# Patient Record
Sex: MALE | Race: WHITE | NOT HISPANIC OR LATINO | Employment: FULL TIME | URBAN - METROPOLITAN AREA
[De-identification: names, ages, dates, MRNs, and addresses within clinical notes are randomized per-mention and may not be internally consistent; named-entity substitution may affect disease eponyms.]

---

## 2023-08-09 ENCOUNTER — HOSPITAL ENCOUNTER (OUTPATIENT)
Dept: DATA CONVERSION | Facility: HOSPITAL | Age: 63
Discharge: HOME | End: 2023-08-09

## 2023-08-09 DIAGNOSIS — M19.011 PRIMARY OSTEOARTHRITIS, RIGHT SHOULDER: ICD-10-CM

## 2023-10-16 DIAGNOSIS — I10 HYPERTENSION, UNSPECIFIED TYPE: Primary | ICD-10-CM

## 2023-10-16 RX ORDER — LISINOPRIL 20 MG/1
20 TABLET ORAL DAILY
COMMUNITY
Start: 2023-07-12 | End: 2023-10-16 | Stop reason: SDUPTHER

## 2023-10-16 NOTE — TELEPHONE ENCOUNTER
Rx Refill Request Telephone Encounter    Name:  Doug Encinas  :  [id number]  Medication Name:  Lisinopril , he is out . CPE . Told him MJM is out            Specific Pharmacy location:  [facility]  Date of last appointment:    Date of next appointment:    Best number to reach patient:

## 2023-10-18 RX ORDER — LISINOPRIL 20 MG/1
20 TABLET ORAL DAILY
Qty: 90 TABLET | Refills: 0 | Status: SHIPPED | OUTPATIENT
Start: 2023-10-18 | End: 2024-01-02

## 2023-11-02 DIAGNOSIS — E11.9 TYPE 2 DIABETES MELLITUS WITHOUT COMPLICATION, UNSPECIFIED WHETHER LONG TERM INSULIN USE (MULTI): ICD-10-CM

## 2023-11-02 DIAGNOSIS — E11.9 TYPE 2 DIABETES MELLITUS WITHOUT COMPLICATION, WITHOUT LONG-TERM CURRENT USE OF INSULIN (MULTI): Primary | ICD-10-CM

## 2023-11-02 RX ORDER — TIRZEPATIDE 5 MG/.5ML
5 INJECTION, SOLUTION SUBCUTANEOUS
Qty: 6 ML | Refills: 3 | Status: SHIPPED | OUTPATIENT
Start: 2023-11-02 | End: 2024-05-03 | Stop reason: SDUPTHER

## 2023-11-09 RX ORDER — TIRZEPATIDE 2.5 MG/.5ML
INJECTION, SOLUTION SUBCUTANEOUS
Qty: 2 ML | Refills: 3 | Status: SHIPPED | OUTPATIENT
Start: 2023-11-09 | End: 2023-12-21 | Stop reason: ALTCHOICE

## 2023-12-01 ENCOUNTER — LAB (OUTPATIENT)
Dept: LAB | Facility: LAB | Age: 63
End: 2023-12-01
Payer: COMMERCIAL

## 2023-12-01 DIAGNOSIS — I10 ESSENTIAL (PRIMARY) HYPERTENSION: Primary | ICD-10-CM

## 2023-12-01 DIAGNOSIS — E66.9 OBESITY, UNSPECIFIED: ICD-10-CM

## 2023-12-01 DIAGNOSIS — Z00.00 ENCOUNTER FOR GENERAL ADULT MEDICAL EXAMINATION WITHOUT ABNORMAL FINDINGS: ICD-10-CM

## 2023-12-01 DIAGNOSIS — E78.00 PURE HYPERCHOLESTEROLEMIA, UNSPECIFIED: ICD-10-CM

## 2023-12-01 LAB
ALBUMIN SERPL-MCNC: 4.6 G/DL (ref 3.5–5)
ALP BLD-CCNC: 67 U/L (ref 35–125)
ALT SERPL-CCNC: 23 U/L (ref 5–40)
ANION GAP SERPL CALC-SCNC: 17 MMOL/L
APPEARANCE UR: CLEAR
AST SERPL-CCNC: 20 U/L (ref 5–40)
BASOPHILS # BLD AUTO: 0.09 X10*3/UL (ref 0–0.1)
BASOPHILS NFR BLD AUTO: 1.2 %
BILIRUB SERPL-MCNC: 0.7 MG/DL (ref 0.1–1.2)
BILIRUB UR STRIP.AUTO-MCNC: NEGATIVE MG/DL
BUN SERPL-MCNC: 21 MG/DL (ref 8–25)
CALCIUM SERPL-MCNC: 9.7 MG/DL (ref 8.5–10.4)
CHLORIDE SERPL-SCNC: 102 MMOL/L (ref 97–107)
CHOLEST SERPL-MCNC: 116 MG/DL (ref 133–200)
CHOLEST/HDLC SERPL: 2.6 {RATIO}
CO2 SERPL-SCNC: 24 MMOL/L (ref 24–31)
COLOR UR: YELLOW
CREAT SERPL-MCNC: 0.8 MG/DL (ref 0.4–1.6)
EOSINOPHIL # BLD AUTO: 0.25 X10*3/UL (ref 0–0.7)
EOSINOPHIL NFR BLD AUTO: 3.3 %
ERYTHROCYTE [DISTWIDTH] IN BLOOD BY AUTOMATED COUNT: 13.2 % (ref 11.5–14.5)
GFR SERPL CREATININE-BSD FRML MDRD: >90 ML/MIN/1.73M*2
GLUCOSE SERPL-MCNC: 99 MG/DL (ref 65–99)
GLUCOSE UR STRIP.AUTO-MCNC: NORMAL MG/DL
HCT VFR BLD AUTO: 47.9 % (ref 41–52)
HDLC SERPL-MCNC: 45 MG/DL
HGB BLD-MCNC: 16 G/DL (ref 13.5–17.5)
IMM GRANULOCYTES # BLD AUTO: 0.04 X10*3/UL (ref 0–0.7)
IMM GRANULOCYTES NFR BLD AUTO: 0.5 % (ref 0–0.9)
KETONES UR STRIP.AUTO-MCNC: NEGATIVE MG/DL
LDLC SERPL CALC-MCNC: 49 MG/DL (ref 65–130)
LEUKOCYTE ESTERASE UR QL STRIP.AUTO: NEGATIVE
LYMPHOCYTES # BLD AUTO: 2.65 X10*3/UL (ref 1.2–4.8)
LYMPHOCYTES NFR BLD AUTO: 35.3 %
MCH RBC QN AUTO: 29.1 PG (ref 26–34)
MCHC RBC AUTO-ENTMCNC: 33.4 G/DL (ref 32–36)
MCV RBC AUTO: 87 FL (ref 80–100)
MONOCYTES # BLD AUTO: 0.54 X10*3/UL (ref 0.1–1)
MONOCYTES NFR BLD AUTO: 7.2 %
NEUTROPHILS # BLD AUTO: 3.94 X10*3/UL (ref 1.2–7.7)
NEUTROPHILS NFR BLD AUTO: 52.5 %
NITRITE UR QL STRIP.AUTO: NEGATIVE
NRBC BLD-RTO: 0 /100 WBCS (ref 0–0)
PH UR STRIP.AUTO: 5.5 [PH]
PLATELET # BLD AUTO: 287 X10*3/UL (ref 150–450)
POTASSIUM SERPL-SCNC: 4.8 MMOL/L (ref 3.4–5.1)
PROT SERPL-MCNC: 6.5 G/DL (ref 5.9–7.9)
PROT UR STRIP.AUTO-MCNC: NEGATIVE MG/DL
PSA SERPL-MCNC: 2.7 NG/ML
RBC # BLD AUTO: 5.49 X10*6/UL (ref 4.5–5.9)
RBC # UR STRIP.AUTO: NEGATIVE /UL
SODIUM SERPL-SCNC: 143 MMOL/L (ref 133–145)
SP GR UR STRIP.AUTO: 1.03
TRIGL SERPL-MCNC: 109 MG/DL (ref 40–150)
TSH SERPL DL<=0.05 MIU/L-ACNC: 3.22 MIU/L (ref 0.27–4.2)
UROBILINOGEN UR STRIP.AUTO-MCNC: NORMAL MG/DL
WBC # BLD AUTO: 7.5 X10*3/UL (ref 4.4–11.3)

## 2023-12-01 PROCEDURE — 84153 ASSAY OF PSA TOTAL: CPT

## 2023-12-01 PROCEDURE — 36415 COLL VENOUS BLD VENIPUNCTURE: CPT

## 2023-12-01 PROCEDURE — 81003 URINALYSIS AUTO W/O SCOPE: CPT

## 2023-12-01 PROCEDURE — 80053 COMPREHEN METABOLIC PANEL: CPT

## 2023-12-01 PROCEDURE — 85025 COMPLETE CBC W/AUTO DIFF WBC: CPT

## 2023-12-01 PROCEDURE — 84443 ASSAY THYROID STIM HORMONE: CPT

## 2023-12-01 PROCEDURE — 80061 LIPID PANEL: CPT

## 2023-12-02 DIAGNOSIS — K21.9 GASTROESOPHAGEAL REFLUX DISEASE WITHOUT ESOPHAGITIS: ICD-10-CM

## 2023-12-02 DIAGNOSIS — M19.019 SHOULDER ARTHRITIS: Primary | ICD-10-CM

## 2023-12-02 DIAGNOSIS — N40.0 BENIGN PROSTATIC HYPERPLASIA WITHOUT LOWER URINARY TRACT SYMPTOMS: ICD-10-CM

## 2023-12-02 DIAGNOSIS — E78.5 HYPERLIPIDEMIA, UNSPECIFIED HYPERLIPIDEMIA TYPE: ICD-10-CM

## 2023-12-05 RX ORDER — TAMSULOSIN HYDROCHLORIDE 0.4 MG/1
0.4 CAPSULE ORAL DAILY
Qty: 90 CAPSULE | Refills: 2 | Status: SHIPPED | OUTPATIENT
Start: 2023-12-05

## 2023-12-05 RX ORDER — MELOXICAM 15 MG/1
15 TABLET ORAL DAILY
Qty: 90 TABLET | Refills: 2 | Status: SHIPPED | OUTPATIENT
Start: 2023-12-05 | End: 2024-04-20 | Stop reason: HOSPADM

## 2023-12-05 RX ORDER — OMEPRAZOLE 20 MG/1
20 CAPSULE, DELAYED RELEASE ORAL DAILY
Qty: 90 CAPSULE | Refills: 2 | Status: SHIPPED | OUTPATIENT
Start: 2023-12-05

## 2023-12-05 RX ORDER — ATORVASTATIN CALCIUM 80 MG/1
80 TABLET, FILM COATED ORAL DAILY
Qty: 90 TABLET | Refills: 2 | Status: SHIPPED | OUTPATIENT
Start: 2023-12-05

## 2023-12-21 ENCOUNTER — OFFICE VISIT (OUTPATIENT)
Dept: PRIMARY CARE | Facility: CLINIC | Age: 63
End: 2023-12-21
Payer: COMMERCIAL

## 2023-12-21 VITALS
BODY MASS INDEX: 29.99 KG/M2 | HEART RATE: 74 BPM | WEIGHT: 180 LBS | DIASTOLIC BLOOD PRESSURE: 76 MMHG | SYSTOLIC BLOOD PRESSURE: 118 MMHG | OXYGEN SATURATION: 99 % | HEIGHT: 65 IN

## 2023-12-21 DIAGNOSIS — E66.9 OBESITY WITHOUT SERIOUS COMORBIDITY, UNSPECIFIED CLASSIFICATION, UNSPECIFIED OBESITY TYPE: ICD-10-CM

## 2023-12-21 DIAGNOSIS — E78.00 HYPERCHOLESTEREMIA: ICD-10-CM

## 2023-12-21 DIAGNOSIS — E11.9 TYPE 2 DIABETES MELLITUS WITHOUT COMPLICATION, WITHOUT LONG-TERM CURRENT USE OF INSULIN (MULTI): ICD-10-CM

## 2023-12-21 DIAGNOSIS — I10 ESSENTIAL HYPERTENSION, BENIGN: Primary | ICD-10-CM

## 2023-12-21 DIAGNOSIS — Z00.00 WELL ADULT EXAM: ICD-10-CM

## 2023-12-21 PROCEDURE — 3074F SYST BP LT 130 MM HG: CPT | Performed by: FAMILY MEDICINE

## 2023-12-21 PROCEDURE — 3078F DIAST BP <80 MM HG: CPT | Performed by: FAMILY MEDICINE

## 2023-12-21 PROCEDURE — 1036F TOBACCO NON-USER: CPT | Performed by: FAMILY MEDICINE

## 2023-12-21 PROCEDURE — 3048F LDL-C <100 MG/DL: CPT | Performed by: FAMILY MEDICINE

## 2023-12-21 PROCEDURE — 4010F ACE/ARB THERAPY RXD/TAKEN: CPT | Performed by: FAMILY MEDICINE

## 2023-12-21 PROCEDURE — 99396 PREV VISIT EST AGE 40-64: CPT | Performed by: FAMILY MEDICINE

## 2023-12-21 PROCEDURE — 3044F HG A1C LEVEL LT 7.0%: CPT | Performed by: FAMILY MEDICINE

## 2023-12-21 RX ORDER — METFORMIN HYDROCHLORIDE 750 MG/1
750 TABLET, EXTENDED RELEASE ORAL
COMMUNITY
End: 2023-12-26

## 2023-12-21 RX ORDER — GABAPENTIN 400 MG/1
400 CAPSULE ORAL 2 TIMES DAILY
COMMUNITY
Start: 2023-07-14 | End: 2024-06-04 | Stop reason: ALTCHOICE

## 2023-12-21 NOTE — PROGRESS NOTES
"Subjective   Patient ID: Doug Encinas is a 63 y.o. male who presents for Annual Exam.    HPI Here for physical exam.  Patient has chronic arthritis in his hands back and neck, and he uses meloxicam daily.  Patient has reflux and is stable on omeprazole.  Patient has benign prostatic hypertrophy. He is stable on tamsulosin.  Patient has hyperlipidemia and is stable on atorvastatin 40 milligrams daily.  Patient has diabetes. He has on metformin  milligrams twice a day as well as glimepiride 1 milligram daily.  Well on Mounjaro5 mg weekly.    Review of Systems  Constitutional Symptoms: Patient is positive for concerted weight loss. t is negative for fever, loss of appetite, headaches, fatigue.   Eyes: Pt is negative for loss and blurring of vision, double vision.   Ear, Nose, Mouth, Throat: Pt is negative for hearing loss, tinnitus, nasal congestion, rhinorrhea, nose bleeds, teeth problems, mouth sores, gum disease, dysphagia, sore throat.   Cardiovascular: Pt is negative for chest pain/pressure, palpitations, edema, claudication.   Respiratory: Pt is negative for shortness of breath, dyspnea on exertion, pain with breathing, coughing.   Gastrointestinal: Pt is negative for anorexia, indigestion, nausea, vomiting, abdominal pain, change in bowel habits, diarrhea, constipation, hematochezia, melena, blood in stool.   Musculoskeletal: patient is positive for pain with walking in the right lower leg. Pt is negative for joint pain, joint swelling, myalgias, cramps.   Integumentary: Pt is negative for change in mole, skin trouble or rash.   Neurological: Pt is negative for headache, numbness, tingling, weakness, tremors.   Psychiatric: Pt is negative for depression, anxiety.   Endocrine: Pt is negative for weight gain, heat or cold intolerance, polyuria, polydipsia, polyphagia.   Objective   /76   Pulse 74   Ht 1.651 m (5' 5\")   Wt 81.6 kg (180 lb)   SpO2 99%   BMI 29.95 kg/m²     Physical Exam  General " Appearance: Vital Signs have been reviewed. Comfortable. Well nourished, and well developed. He is awake, alert, and oriented and appears his stated age. The patient is cooperative with exam.  Head: Hair pattern reveals a normal pattern for patients age and The face shows no abnormalities .  Eyes: PERRLA, EOMI, conjunctiva and sclerae clear. Extraocular muscle exam reveals EOMI.  Ears, Nose, Mouth, Throat: EARS: External bilateral ears reveals normal helix, tragus and ear lobe. Bilateral canals are normal. Both tympanic membranes are pearly gray and landmarks normal .   NOSE: Nasal mucosa in both nostrils reveals no polyps, ulcerations, or lesions. Teeth reveal good repair. Posterior pharynx reveals no abnormalities.  Neck: Neck reveals supple, no adenopathy, no thyromegaly, or carotid bruits.  Chest: Lungs are clear to auscultation bilaterally with no wheezes, rales, or rhonchi.  Cardiovascular: RRR without MRG. Bilateral DP pulses are 2+. Extremities reveal no cyanosis, clubbing, or edema.  Abdomen: Abdomen is soft, NT, ND with no masses.  Genitourinary: Deferred  Musculoskeletal: 5/5 and equal strength in bilateral upper and lower extremities. Diminished pedal pulses bilaterally  Skin: Patient has an epidermal cyst on the right upper back. Skin reveals good turgor and no rashes .  Neurological: Neuro: Intact and non-focal.  Psychiatric: Patient has appropriate judgement. Patient has good insight. Patient's mood is appropriate.  Assessment/Plan   Problem List Items Addressed This Visit             ICD-10-CM    Type 2 diabetes mellitus without complication (CMS/HCC)  Improved.  There was no hemoglobin A1c done in recent labs and will not order 1 at this time.  Patient will follow-up in 6 months for recheck E11.9    Relevant Orders    POCT glycosylated hemoglobin (Hb A1C) manually resulted    Comprehensive metabolic panel     Other Visit Diagnoses         Codes    Essential hypertension, benign    -  Primary   Stable. I10    Obesity without serious comorbidity, unspecified classification, unspecified obesity type    Much improved.  Patient is lost more than 20 pounds in the last 6 months. E66.9    Hypercholesteremia    Stable.  Continue on current medication. E78.00    Well adult exam    Exam. Z00.00

## 2023-12-24 DIAGNOSIS — Z79.4 TYPE 2 DIABETES MELLITUS WITHOUT COMPLICATION, WITH LONG-TERM CURRENT USE OF INSULIN (MULTI): ICD-10-CM

## 2023-12-24 DIAGNOSIS — E11.9 TYPE 2 DIABETES MELLITUS WITHOUT COMPLICATION, WITH LONG-TERM CURRENT USE OF INSULIN (MULTI): ICD-10-CM

## 2023-12-26 RX ORDER — METFORMIN HYDROCHLORIDE 750 MG/1
750 TABLET, EXTENDED RELEASE ORAL 2 TIMES DAILY
Qty: 180 TABLET | Refills: 2 | Status: SHIPPED | OUTPATIENT
Start: 2023-12-26

## 2023-12-30 DIAGNOSIS — I10 HYPERTENSION, UNSPECIFIED TYPE: ICD-10-CM

## 2024-01-02 RX ORDER — LISINOPRIL 20 MG/1
20 TABLET ORAL DAILY
Qty: 90 TABLET | Refills: 3 | Status: SHIPPED | OUTPATIENT
Start: 2024-01-02

## 2024-02-29 ENCOUNTER — OFFICE VISIT (OUTPATIENT)
Dept: PRIMARY CARE | Facility: CLINIC | Age: 64
End: 2024-02-29
Payer: COMMERCIAL

## 2024-02-29 VITALS
WEIGHT: 180.8 LBS | HEART RATE: 79 BPM | BODY MASS INDEX: 30.09 KG/M2 | OXYGEN SATURATION: 98 % | SYSTOLIC BLOOD PRESSURE: 118 MMHG | TEMPERATURE: 97.4 F | DIASTOLIC BLOOD PRESSURE: 70 MMHG

## 2024-02-29 DIAGNOSIS — H10.31 ACUTE BACTERIAL CONJUNCTIVITIS OF RIGHT EYE: Primary | ICD-10-CM

## 2024-02-29 PROCEDURE — 3074F SYST BP LT 130 MM HG: CPT

## 2024-02-29 PROCEDURE — 1036F TOBACCO NON-USER: CPT

## 2024-02-29 PROCEDURE — 99213 OFFICE O/P EST LOW 20 MIN: CPT

## 2024-02-29 PROCEDURE — 4010F ACE/ARB THERAPY RXD/TAKEN: CPT

## 2024-02-29 PROCEDURE — 3078F DIAST BP <80 MM HG: CPT

## 2024-02-29 RX ORDER — POLYMYXIN B SULFATE AND TRIMETHOPRIM 1; 10000 MG/ML; [USP'U]/ML
1 SOLUTION OPHTHALMIC EVERY 4 HOURS
Qty: 10 ML | Refills: 0 | Status: SHIPPED | OUTPATIENT
Start: 2024-02-29 | End: 2024-03-07

## 2024-02-29 ASSESSMENT — ENCOUNTER SYMPTOMS
SINUS PRESSURE: 1
EYE REDNESS: 1
EYE DISCHARGE: 1
CHILLS: 0
FEVER: 0
EYE ITCHING: 1

## 2024-02-29 ASSESSMENT — PATIENT HEALTH QUESTIONNAIRE - PHQ9
1. LITTLE INTEREST OR PLEASURE IN DOING THINGS: NOT AT ALL
2. FEELING DOWN, DEPRESSED OR HOPELESS: NOT AT ALL
SUM OF ALL RESPONSES TO PHQ9 QUESTIONS 1 AND 2: 0

## 2024-02-29 ASSESSMENT — PAIN SCALES - GENERAL: PAINLEVEL: 2

## 2024-02-29 NOTE — PROGRESS NOTES
Subjective   Patient ID: Doug Encinas is a 64 y.o. male who presents for itchy, red eyes (Started yesterday.  Teary.  Right eye ).    HPI   Doug is seen today for c/o red right eye with drainage for 2 days. Woke up this morning with crusting around the eye. Wears glasses. No known injury. Also reports sinus congestion and pressure for the past few weeks but these symptoms seem to be improving. Has been using intranasal steroid with mild relief. Grand kids are also sick. Denies chest pain, shortness of breath, fever, chills, nausea, vomiting, abdominal pain, photophobia, double vision, foreign body sensation.     Review of Systems   Constitutional:  Negative for chills and fever.   HENT:  Positive for congestion and sinus pressure.    Eyes:  Positive for discharge, redness and itching.   All other systems reviewed and are negative.    Objective   /70 (BP Location: Left arm, Patient Position: Sitting)   Pulse 79   Temp 36.3 °C (97.4 °F) (Temporal)   Wt 82 kg (180 lb 12.8 oz)   SpO2 98%   BMI 30.09 kg/m²     Physical Exam  Vitals and nursing note reviewed.   Constitutional:       General: He is not in acute distress.  HENT:      Right Ear: Tympanic membrane and ear canal normal.      Left Ear: Tympanic membrane and ear canal normal.      Nose: Nose normal.      Mouth/Throat:      Pharynx: No oropharyngeal exudate or posterior oropharyngeal erythema.   Eyes:      General: Lids are everted, no foreign bodies appreciated.      Extraocular Movements: Extraocular movements intact.      Conjunctiva/sclera:      Right eye: Right conjunctiva is injected.      Left eye: Left conjunctiva is not injected.      Pupils: Pupils are equal, round, and reactive to light.   Cardiovascular:      Rate and Rhythm: Normal rate and regular rhythm.   Pulmonary:      Effort: Pulmonary effort is normal.      Breath sounds: Normal breath sounds.   Musculoskeletal:         General: Normal range of motion.      Cervical back: Neck  supple.   Lymphadenopathy:      Cervical: No cervical adenopathy.   Skin:     General: Skin is warm.   Neurological:      General: No focal deficit present.      Mental Status: He is alert.   Psychiatric:         Mood and Affect: Mood normal.     Assessment/Plan   Problem List Items Addressed This Visit    None  Visit Diagnoses         Codes    Acute bacterial conjunctivitis of right eye    -  Primary  Discussed oral antibiotic treatment for URI and conjunctivitis. Patient declines as he feels like URI symptoms are improving.   Polytrim drops to affected eye as directed. Risks and benefits of medication discussed and prescribed.   May use OTC Refresh eye drops for eye discomfort, redness. Warm compresses to remove drainage.   Follow up if symptoms do not improve within 24-48 hours, or sooner for development of eye pain, changes in vision.  H10.31    Relevant Medications    polymyxin B sulf-trimethoprim (Polytrim) ophthalmic solution

## 2024-03-27 ENCOUNTER — TELEPHONE (OUTPATIENT)
Dept: PRIMARY CARE | Facility: CLINIC | Age: 64
End: 2024-03-27
Payer: COMMERCIAL

## 2024-03-27 DIAGNOSIS — E11.9 TYPE 2 DIABETES MELLITUS WITHOUT COMPLICATION, UNSPECIFIED WHETHER LONG TERM INSULIN USE (MULTI): Primary | ICD-10-CM

## 2024-03-28 ENCOUNTER — HOSPITAL ENCOUNTER (OUTPATIENT)
Dept: RADIOLOGY | Facility: CLINIC | Age: 64
Discharge: HOME | End: 2024-03-28
Payer: COMMERCIAL

## 2024-03-28 DIAGNOSIS — M54.12 RADICULOPATHY, CERVICAL REGION: ICD-10-CM

## 2024-03-28 DIAGNOSIS — R29.898 OTHER SYMPTOMS AND SIGNS INVOLVING THE MUSCULOSKELETAL SYSTEM: ICD-10-CM

## 2024-03-28 DIAGNOSIS — M50.20 OTHER CERVICAL DISC DISPLACEMENT, UNSPECIFIED CERVICAL REGION: ICD-10-CM

## 2024-03-28 PROCEDURE — 72125 CT NECK SPINE W/O DYE: CPT | Performed by: RADIOLOGY

## 2024-03-28 PROCEDURE — 72125 CT NECK SPINE W/O DYE: CPT

## 2024-04-15 ENCOUNTER — PRE-ADMISSION TESTING (OUTPATIENT)
Dept: PREADMISSION TESTING | Facility: HOSPITAL | Age: 64
End: 2024-04-15
Payer: COMMERCIAL

## 2024-04-15 VITALS
OXYGEN SATURATION: 98 % | TEMPERATURE: 96.6 F | DIASTOLIC BLOOD PRESSURE: 67 MMHG | HEART RATE: 78 BPM | HEIGHT: 65 IN | WEIGHT: 180 LBS | SYSTOLIC BLOOD PRESSURE: 109 MMHG | BODY MASS INDEX: 29.99 KG/M2 | RESPIRATION RATE: 16 BRPM

## 2024-04-15 DIAGNOSIS — Z01.818 PRE-OP TESTING: Primary | ICD-10-CM

## 2024-04-15 LAB
ABO GROUP (TYPE) IN BLOOD: NORMAL
ANION GAP SERPL CALC-SCNC: 9 MMOL/L
ANTIBODY SCREEN: NORMAL
APPEARANCE UR: CLEAR
ATRIAL RATE: 71 BPM
BASOPHILS # BLD AUTO: 0.04 X10*3/UL (ref 0–0.1)
BASOPHILS NFR BLD AUTO: 0.5 %
BILIRUB UR STRIP.AUTO-MCNC: NEGATIVE MG/DL
BUN SERPL-MCNC: 28 MG/DL (ref 8–25)
CALCIUM SERPL-MCNC: 9.5 MG/DL (ref 8.5–10.4)
CHLORIDE SERPL-SCNC: 100 MMOL/L (ref 97–107)
CO2 SERPL-SCNC: 26 MMOL/L (ref 24–31)
COLOR UR: YELLOW
CREAT SERPL-MCNC: 0.8 MG/DL (ref 0.4–1.6)
EGFRCR SERPLBLD CKD-EPI 2021: >90 ML/MIN/1.73M*2
EOSINOPHIL # BLD AUTO: 0.18 X10*3/UL (ref 0–0.7)
EOSINOPHIL NFR BLD AUTO: 2.1 %
ERYTHROCYTE [DISTWIDTH] IN BLOOD BY AUTOMATED COUNT: 13.4 % (ref 11.5–14.5)
EST. AVERAGE GLUCOSE BLD GHB EST-MCNC: 123 MG/DL
GLUCOSE SERPL-MCNC: 126 MG/DL (ref 65–99)
GLUCOSE UR STRIP.AUTO-MCNC: NORMAL MG/DL
HBA1C MFR BLD: 5.9 %
HCT VFR BLD AUTO: 46.7 % (ref 41–52)
HGB BLD-MCNC: 16 G/DL (ref 13.5–17.5)
IMM GRANULOCYTES # BLD AUTO: 0.04 X10*3/UL (ref 0–0.7)
IMM GRANULOCYTES NFR BLD AUTO: 0.5 % (ref 0–0.9)
KETONES UR STRIP.AUTO-MCNC: NEGATIVE MG/DL
LEUKOCYTE ESTERASE UR QL STRIP.AUTO: NEGATIVE
LYMPHOCYTES # BLD AUTO: 2.76 X10*3/UL (ref 1.2–4.8)
LYMPHOCYTES NFR BLD AUTO: 32.9 %
MCH RBC QN AUTO: 29.2 PG (ref 26–34)
MCHC RBC AUTO-ENTMCNC: 34.3 G/DL (ref 32–36)
MCV RBC AUTO: 85 FL (ref 80–100)
MONOCYTES # BLD AUTO: 0.65 X10*3/UL (ref 0.1–1)
MONOCYTES NFR BLD AUTO: 7.7 %
MUCOUS THREADS #/AREA URNS AUTO: NORMAL /LPF
NEUTROPHILS # BLD AUTO: 4.72 X10*3/UL (ref 1.2–7.7)
NEUTROPHILS NFR BLD AUTO: 56.3 %
NITRITE UR QL STRIP.AUTO: NEGATIVE
NRBC BLD-RTO: 0 /100 WBCS (ref 0–0)
P AXIS: 38 DEGREES
P OFFSET: 200 MS
P ONSET: 147 MS
PH UR STRIP.AUTO: 5.5 [PH]
PLATELET # BLD AUTO: 260 X10*3/UL (ref 150–450)
POTASSIUM SERPL-SCNC: 4.3 MMOL/L (ref 3.4–5.1)
PR INTERVAL: 158 MS
PROT UR STRIP.AUTO-MCNC: NORMAL MG/DL
Q ONSET: 226 MS
QRS COUNT: 12 BEATS
QRS DURATION: 86 MS
QT INTERVAL: 382 MS
QTC CALCULATION(BAZETT): 415 MS
QTC FREDERICIA: 404 MS
R AXIS: 16 DEGREES
RBC # BLD AUTO: 5.48 X10*6/UL (ref 4.5–5.9)
RBC # UR STRIP.AUTO: NEGATIVE /UL
RBC #/AREA URNS AUTO: NORMAL /HPF
RH FACTOR (ANTIGEN D): NORMAL
SODIUM SERPL-SCNC: 135 MMOL/L (ref 133–145)
SP GR UR STRIP.AUTO: 1.03
T AXIS: 35 DEGREES
T OFFSET: 417 MS
UROBILINOGEN UR STRIP.AUTO-MCNC: NORMAL MG/DL
VENTRICULAR RATE: 71 BPM
WBC # BLD AUTO: 8.4 X10*3/UL (ref 4.4–11.3)
WBC #/AREA URNS AUTO: NORMAL /HPF

## 2024-04-15 PROCEDURE — 93010 ELECTROCARDIOGRAM REPORT: CPT | Performed by: INTERNAL MEDICINE

## 2024-04-15 PROCEDURE — 80048 BASIC METABOLIC PNL TOTAL CA: CPT

## 2024-04-15 PROCEDURE — 81001 URINALYSIS AUTO W/SCOPE: CPT

## 2024-04-15 PROCEDURE — 85025 COMPLETE CBC W/AUTO DIFF WBC: CPT

## 2024-04-15 PROCEDURE — 83036 HEMOGLOBIN GLYCOSYLATED A1C: CPT

## 2024-04-15 PROCEDURE — 86901 BLOOD TYPING SEROLOGIC RH(D): CPT

## 2024-04-15 PROCEDURE — 87081 CULTURE SCREEN ONLY: CPT | Mod: TRILAB,WESLAB

## 2024-04-15 PROCEDURE — 93005 ELECTROCARDIOGRAM TRACING: CPT

## 2024-04-15 PROCEDURE — 99204 OFFICE O/P NEW MOD 45 MIN: CPT | Performed by: PHYSICIAN ASSISTANT

## 2024-04-15 PROCEDURE — 36415 COLL VENOUS BLD VENIPUNCTURE: CPT

## 2024-04-15 RX ORDER — CHLORHEXIDINE GLUCONATE ORAL RINSE 1.2 MG/ML
SOLUTION DENTAL
Qty: 473 ML | Refills: 0 | Status: SHIPPED | OUTPATIENT
Start: 2024-04-18 | End: 2024-04-20 | Stop reason: HOSPADM

## 2024-04-15 ASSESSMENT — CHADS2 SCORE
CHF: NO
AGE GREATER THAN OR EQUAL TO 75: NO
HYPERTENSION: YES
DIABETES: YES
CHADS2 SCORE: 2
PRIOR STROKE OR TIA OR THROMBOEMBOLISM: NO

## 2024-04-15 ASSESSMENT — DUKE ACTIVITY SCORE INDEX (DASI)
CAN YOU WALK A BLOCK OR TWO ON LEVEL GROUND: YES
CAN YOU DO MODERATE WORK AROUND THE HOUSE LIKE VACUUMING, SWEEPING FLOORS OR CARRYING GROCERIES: YES
CAN YOU DO HEAVY WORK AROUND THE HOUSE LIKE SCRUBBING FLOORS OR LIFTING AND MOVING HEAVY FURNITURE: YES
CAN YOU DO LIGHT WORK AROUND THE HOUSE LIKE DUSTING OR WASHING DISHES: YES
CAN YOU DO YARD WORK LIKE RAKING LEAVES, WEEDING OR PUSHING A MOWER: YES
CAN YOU WALK INDOORS, SUCH AS AROUND YOUR HOUSE: YES
DASI METS SCORE: 9.9
CAN YOU PARTICIPATE IN STRENOUS SPORTS LIKE SWIMMING, SINGLES TENNIS, FOOTBALL, BASKETBALL, OR SKIING: YES
CAN YOU PARTICIPATE IN MODERATE RECREATIONAL ACTIVITIES LIKE GOLF, BOWLING, DANCING, DOUBLES TENNIS OR THROWING A BASEBALL OR FOOTBALL: YES
TOTAL_SCORE: 58.2
CAN YOU RUN A SHORT DISTANCE: YES
CAN YOU CLIMB A FLIGHT OF STAIRS OR WALK UP A HILL: YES
CAN YOU HAVE SEXUAL RELATIONS: YES
CAN YOU TAKE CARE OF YOURSELF (EAT, DRESS, BATHE, OR USE TOILET): YES

## 2024-04-15 ASSESSMENT — ENCOUNTER SYMPTOMS: ARTHRALGIAS: 1

## 2024-04-15 NOTE — H&P (VIEW-ONLY)
"CPM/PAT Evaluation       Name: Doug Encinas (Doug Encinas)  /Age: 1960/64 y.o.     In-Person       Chief Complaint: \"neck pain\"    HPI  The patient is a 64 year old male.  For the past several years he has experienced posterior cervical neck pain with movement.  Over the past six months the pain has been severe and is radiating to the right shoulder.  He has associated right shoulder/upper arm tingling and weakness.  A cortisone injection several weeks ago was not helpful.  He was referred to Dr. Garrison and surgical intervention is recommended.    Past Medical History:   Diagnosis Date    BPH (benign prostatic hyperplasia)     Diabetes mellitus (Multi)     GERD (gastroesophageal reflux disease)     Hyperlipidemia     Hypertension     Nephrolithiasis        Past Surgical History:   Procedure Laterality Date    COLONOSCOPY      KNEE ARTHROPLASTY Left     (partial)    TOE SURGERY Bilateral     Great toe arthroplasty     Social History     Tobacco Use    Smoking status: Former     Current packs/day: 0.00     Average packs/day: 1 pack/day for 38.0 years (38.0 ttl pk-yrs)     Types: Cigarettes     Start date:      Quit date:      Years since quittin.2    Smokeless tobacco: Never   Substance Use Topics    Alcohol use: Not Currently     Social History     Substance and Sexual Activity   Drug Use Never       No Known Allergies    Current Outpatient Medications   Medication Sig Dispense Refill    tirzepatide (Mounjaro) 5 mg/0.5 mL pen injector Inject 5 mg under the skin 1 (one) time per week. (Patient taking differently: Inject 5 mg under the skin 1 (one) time per week. Last taken 2024) 6 mL 3    atorvastatin (Lipitor) 80 mg tablet TAKE 1 TABLET DAILY 90 tablet 2    [START ON 2024] chlorhexidine (Peridex) 0.12 % solution Use as directed - patient may  prescription prior to 2024. Do not start before 2024. 473 mL 0    gabapentin (Neurontin) 400 mg capsule Take 1 capsule " "(400 mg) by mouth 2 times a day.      lisinopril 20 mg tablet TAKE 1 TABLET BY MOUTH EVERY DAY 90 tablet 3    meloxicam (Mobic) 15 mg tablet TAKE 1 TABLET DAILY 90 tablet 2    metFORMIN XR (Glucophage-XR) 750 mg 24 hr tablet TAKE 1 TABLET TWICE A  tablet 2    omeprazole (PriLOSEC) 20 mg DR capsule TAKE 1 CAPSULE DAILY 90 capsule 2    tamsulosin (Flomax) 0.4 mg 24 hr capsule TAKE 1 CAPSULE DAILY 90 capsule 2     No current facility-administered medications for this visit.     Review of Systems   Musculoskeletal:  Positive for arthralgias.   All other systems reviewed and are negative.    /67   Pulse 78   Temp 35.9 °C (96.6 °F) (Temporal)   Resp 16   Ht 1.651 m (5' 5\")   Wt 81.6 kg (180 lb)   SpO2 98%   BMI 29.95 kg/m²     Physical Exam  Vitals reviewed.   Constitutional:       Appearance: Normal appearance.   HENT:      Head: Normocephalic and atraumatic.      Mouth/Throat:      Mouth: Mucous membranes are moist.      Pharynx: Oropharynx is clear.   Eyes:      Extraocular Movements: Extraocular movements intact.      Pupils: Pupils are equal, round, and reactive to light.      Comments: Glasses     Cardiovascular:      Rate and Rhythm: Normal rate and regular rhythm.      Heart sounds: Normal heart sounds.   Pulmonary:      Breath sounds: Normal breath sounds.   Abdominal:      General: Bowel sounds are normal.      Palpations: Abdomen is soft.   Musculoskeletal:      Comments: Tenderness with palpation posterior neck and right posterior shoulder.    Skin:     General: Skin is warm and dry.   Neurological:      General: No focal deficit present.      Mental Status: He is alert and oriented to person, place, and time.   Psychiatric:         Mood and Affect: Mood normal.         Behavior: Behavior normal.        PAT AIRWAY:   Airway:     Mallampati::  III    TM distance::  >3 FB    Neck ROM::  Limited   Teeth intact    ASA:  II  DASI RISK SCORE:  58.2  METS SCORE:  9.9  CHAD2 SCORE:  4.0%  REVISED " CARDIAC RISK INDEX:  0.4%  STOP BANG SCORE:  4    Assessment and Plan:     Spinal stenosis of cervical region, brachial neuritis:  Anterior cervical diskectomy and fusion cervical 5-6, cage, plate  Essential hypertension - currently taking lisinopril  Diabetes Mellitus - managed with metformin and Mounjaro     Sulmavikram Florentino PA-C

## 2024-04-15 NOTE — PREPROCEDURE INSTRUCTIONS
PAT DISCHARGE INSTRUCTIONS    Please call the Same Day Surgery (SDS) Department of the hospital where your procedure will be performed after 2:00 PM the day before your surgery. If you are scheduled on a Monday, or a Tuesday following a Monday holiday, you will need to call on the last business day prior to your surgery.    [facility]  [address], [address]  [phone]    [facility]  [address] [place] [address] [phone]    [facility]  [address]  [place] [id number]  [phone]    Please let your surgeon know if:      You develop any open sores, shingles, burning or painful urination as these may increase your risk of an infection.   You no longer wish to have the surgery.   Any other personal circumstances change that may lead to the need to cancel or defer this surgery-such as being sick or getting admitted to any hospital within one week of your planned procedure.    Your contact details change, such as a change of address or phone number.    Starting now:     Please DO NOT drink alcohol or smoke for 24 hours before surgery. It is well known that quitting smoking can make a huge difference to your health and recovery from surgery. The longer you abstain from smoking, the better your chances of a healthy recovery. If you need help with quitting, call 9-800-QUIT-NOW to be connected to a trained counselor who will discuss the best methods to help you quit.     Before your surgery:    Please stop all supplements 7 days prior to surgery. Or as directed by your surgeon.   Please stop taking NSAID pain medicine such as Advil and Motrin 7 days before surgery.    If you develop any fever, cough, cold, rashes, cuts, scratches, scrapes, urinary symptoms or infection anywhere on your body (including teeth and gums) prior to surgery, please call your surgeon’s office as soon as  possible. This may require treatment to reduce the chance of cancellation on the day of surgery.    The day before your surgery:   Get a good night’s rest.  Use the special soap for bathing if you have been instructed to use one.    Scheduled surgery times may change and you will be notified if this occurs - please check your personal voicemail for any updates.     On the morning of surgery:   Wear comfortable, loose fitting clothes which open in the front. Please do not wear moisturizers, creams, lotions, makeup or perfume.    Please bring with you to surgery:   Photo ID and insurance card   Current list of medicines and allergies   Pacemaker/ Defibrillator/Heart stent cards   CPAP machine and mask    Slings/ splints/ crutches   A copy of your complete advanced directive/DHPOA.    Please do NOT bring with you to surgery:   All jewelry and valuables should be left at home.   Prosthetic devices such as contact lenses, hearing aids, dentures, eyelash extensions, hairpins and body piercings must be removed prior to going in to the surgical suite.    After outpatient surgery:   A responsible adult MUST accompany you at the time of discharge and stay with you for 24 hours after your surgery. You may NOT drive yourself home after surgery.    Do not drive, operate machinery, make critical decisions or do activities that require co-ordination or balance until after a night’s sleep.   Do not drink alcoholic beverages for 24 hours.   Instructions for resuming your medications will be provided by your surgeon.    CALL YOUR DOCTOR AFTER SURGERY IF YOU HAVE:     Chills and/or a fever of 101° F or higher.    Redness, swelling, pus or drainage from your surgical wound or a bad smell from the wound.    Lightheadedness, fainting or confusion.    Persistent vomiting (throwing up) and are not able to eat or drink for 12 hours.    Three or more loose, watery bowel movements in 24 hours (diarrhea).   Difficulty or pain while urinating(  after non-urological surgery)    Pain and swelling in your legs, especially if it is only on one side.    Difficulty breathing or are breathing faster than normal.    Any new concerning symptoms.          Why must I stop eating and drinking near surgery time?  With sedation, food or liquid in your stomach can enter your lungs causing serious complications  Increases nausea and vomiting    When do I need to stop eating and drinking before my surgery?   Do not eat or drink after midnight the night before your surgery/procedure.  You may have small sips of water to take your medication.        Patient Information: Pre-Operative Infection Prevention Measures     Why did I have my nose swabbed?  The purpose of the swab is to identify Staphylococcus aureus inside your nose. The swab was sent to the laboratory for culture.  A positive swab/culture for Staphylococcus aureus is called colonization or carriage.      What is Staphylococcus aureus?  Staphylococcus aureus, also known as “staph”, is a germ found on the skin or in the nose of healthy people.  Sometimes Staphylococcus aureus can get into the body and cause an infection.  This can be minor (such as pimples, boils, or other skin problems).  It might also be serious (such as a blood infection, pneumonia, or a surgical site infection).    What is Staphylococcus aureus colonization or carriage?  Colonization or carriage means that a person has the germ but is not sick from it.  These bacteria can be spread on the hands or when breathing or sneezing.    How is Staphylococcus aureus spread?  It is most often spread by close contact with a person or item that carries it.    What happens if my culture is positive for Staphylococcus aureus?  Your doctor/medical team will use this information to guide any antibiotic treatment which may be necessary.  Regardless of the culture results, we will clean the inside of your nose with a betadine swab just before you have your  surgery.      Will I get an infection if I have Staphylococcus aureus in my nose or on my skin?  Anyone can get an infection with Staphylococcus aureus.  However, the best way to reduce your risk of infection is to follow the instructions provided to you for the use of your CHG soap and dental rinse.        Patient Information: Oral/Dental Rinse    What is oral/dental rinse?   It is a mouthwash. It is a way of cleaning the mouth with a germ-killing solution before your surgery.  The solution contains chlorhexidine, commonly known as CHG.   It is used inside the mouth to kill a bacteria known as Staphylococcus aureus.  Let your doctor know if you are allergic to Chlorhexidine.    Why do I need to use CHG oral/dental rinse?  The CHG oral/dental rinse helps to kill a bacteria in your mouth known as Staphylococcus aureus.     This reduces the risk of infection at the surgical site.      Using your CHG oral/dental rinse  STEPS:  Use your CHG oral/dental rinse after you brush your teeth the night before (at bedtime) and the morning of your surgery.  Follow all directions on your prescription label.    Use the cap on the container to measure 15ml   Swish (gargle if you can) the mouthwash in your mouth for at least 30 seconds, (do not swallow) and spit out  After you use your CHG rinse, do not rinse your mouth with water, drink or eat.  Please refer to the prescription label for the appropriate time to resume oral intake      What side effects might I have using the CHG oral/dental rinse?  CHG rinse will stick to plaque on the teeth.  Brush and floss just before use.  Teeth brushing will help avoid staining of plaque during use.      Patient Information: Home Preoperative Antibacterial Shower      What is a home preoperative antibacterial shower?  This shower is a way of cleaning the skin with a germ-killing solution before surgery.  The solution contains chlorhexidine, commonly known as CHG.  CHG is a skin cleanser with  germ-killing ability.  Let your doctor know if you are allergic to chlorhexidine.    Why do I need to take a preoperative antibacterial shower?  Skin is not sterile.  It is best to try to make your skin as free of germs as possible before surgery.  Proper cleansing with a germ-killing soap before surgery can lower the number of germs on your skin.  This helps to reduce the risk of infection at the surgical site.  Following the instructions listed below will help you prepare your skin for surgery.      How do I use the solution?  Steps:  Begin using your CHG soap 5 days before your scheduled surgery on ________________________.    First, wash and rinse your hair using the CHG soap. Keep CHG soap away from ear canals and eyes.  Rinse completely, do not condition.  Hair extensions should be removed.  Wash your face with your normal soap and rinse.    Apply the CHG solution to a clean wet washcloth.  Turn the water off or move away from the water spray to avoid premature rinsing of the CHG soap as you are applying.   Firmly lather your entire body from the neck down.  Do not use on your face.  Pay special attention to the area(s) where your incision(s) will be located unless they are on your face.  Avoid scrubbing your skin too hard.  The important point is to have the CHG soap sit on your skin for 3 minutes.    When the 3 minutes are up, turn on the water and rinse the CHG solution off your body completely.   DO NOT wash with regular soap after you have used the CHG soap solution  Pat yourself dry with a clean, freshly-laundered towel.  DO NOT apply powders, deodorants, or lotions.  Dress in clean, freshly laundered nightclothes.    Be sure to sleep with clean, freshly laundered sheets.  Be aware that CHG will cause stains on fabrics; if you wash them with bleach after use.  Rinse your washcloth and other linens that have contact with CHG completely.  Use only non-chlorine detergents to launder the items used.   The  morning of surgery is the fifth day.  Repeat the above steps and dress in clean comfortable clothing     Whom should I contact if I have any questions regarding the use of CHG soap?  Call the [facility] at [phone] if you have any questions.              Medication List            Accurate as of April 15, 2024  8:52 AM. Always use your most recent med list.                atorvastatin 80 mg tablet  Commonly known as: Lipitor  TAKE 1 TABLET DAILY  Medication Adjustments for Surgery: Take morning of surgery with sip of water, no other fluids     chlorhexidine 0.12 % solution  Commonly known as: Peridex  Use as directed - patient may  prescription prior to 4/18/2024. Do not start before April 18, 2024.  Start taking on: April 18, 2024     gabapentin 400 mg capsule  Commonly known as: Neurontin  Medication Adjustments for Surgery: Take morning of surgery with sip of water, no other fluids     lisinopril 20 mg tablet  TAKE 1 TABLET BY MOUTH EVERY DAY  Medication Adjustments for Surgery: Other (Comment)  Notes to patient: HOLD DAY OF SURGERY     meloxicam 15 mg tablet  Commonly known as: Mobic  TAKE 1 TABLET DAILY  Medication Adjustments for Surgery: Stop 7 days before surgery     metFORMIN  mg 24 hr tablet  Commonly known as: Glucophage-XR  TAKE 1 TABLET TWICE A DAY  Medication Adjustments for Surgery: Other (Comment)  Notes to patient: HOLD DAY OF SURGERY     Mounjaro 5 mg/0.5 mL pen injector  Generic drug: tirzepatide  Inject 5 mg under the skin 1 (one) time per week.  Medication Adjustments for Surgery: Stop 7 days before surgery  Notes to patient: LAST DOSE WAS APRIL 13     omeprazole 20 mg DR capsule  Commonly known as: PriLOSEC  TAKE 1 CAPSULE DAILY  Medication Adjustments for Surgery: Other (Comment)  Notes to patient: CONTINUE PER USUAL/TAKE NIGHT BEFORE SURGERY     tamsulosin 0.4 mg 24 hr capsule  Commonly known as:  Flomax  TAKE 1 CAPSULE DAILY  Medication Adjustments for Surgery: Other (Comment)  Notes to patient: CONTINUE PER USUAL/TAKE NIGHT BEFORE SURGERY

## 2024-04-17 ENCOUNTER — PREP FOR PROCEDURE (OUTPATIENT)
Dept: ORTHOPEDICS | Facility: HOSPITAL | Age: 64
End: 2024-04-17
Payer: COMMERCIAL

## 2024-04-17 LAB — STAPHYLOCOCCUS SPEC CULT: NORMAL

## 2024-04-17 RX ORDER — CEFAZOLIN SODIUM 2 G/100ML
2 INJECTION, SOLUTION INTRAVENOUS
Status: CANCELLED | OUTPATIENT
Start: 2024-04-19 | End: 2024-04-19

## 2024-04-17 RX ORDER — MORPHINE SULFATE IN 0.9 % NACL 30 MG/30ML
PATIENT CONTROLLED ANALGESIA SYRINGE INTRAVENOUS CONTINUOUS
Status: CANCELLED | OUTPATIENT
Start: 2024-04-17

## 2024-04-17 RX ORDER — SODIUM CHLORIDE, SODIUM LACTATE, POTASSIUM CHLORIDE, CALCIUM CHLORIDE 600; 310; 30; 20 MG/100ML; MG/100ML; MG/100ML; MG/100ML
100 INJECTION, SOLUTION INTRAVENOUS CONTINUOUS
Status: CANCELLED | OUTPATIENT
Start: 2024-04-19

## 2024-04-17 RX ORDER — GABAPENTIN 300 MG/1
300 CAPSULE ORAL ONCE
Status: CANCELLED | OUTPATIENT
Start: 2024-04-17 | End: 2024-04-17

## 2024-04-17 RX ORDER — NALOXONE HYDROCHLORIDE 0.4 MG/ML
0.2 INJECTION, SOLUTION INTRAMUSCULAR; INTRAVENOUS; SUBCUTANEOUS AS NEEDED
Status: CANCELLED | OUTPATIENT
Start: 2024-04-17

## 2024-04-17 RX ORDER — ACETAMINOPHEN 325 MG/1
975 TABLET ORAL ONCE
Status: CANCELLED | OUTPATIENT
Start: 2024-04-17

## 2024-04-19 ENCOUNTER — HOSPITAL ENCOUNTER (OUTPATIENT)
Facility: HOSPITAL | Age: 64
Setting detail: OBSERVATION
LOS: 1 days | Discharge: HOME | DRG: 473 | End: 2024-04-20
Attending: ORTHOPAEDIC SURGERY | Admitting: ORTHOPAEDIC SURGERY
Payer: COMMERCIAL

## 2024-04-19 ENCOUNTER — ANESTHESIA EVENT (OUTPATIENT)
Dept: OPERATING ROOM | Facility: HOSPITAL | Age: 64
DRG: 473 | End: 2024-04-19
Payer: COMMERCIAL

## 2024-04-19 ENCOUNTER — APPOINTMENT (OUTPATIENT)
Dept: RADIOLOGY | Facility: HOSPITAL | Age: 64
DRG: 473 | End: 2024-04-19
Payer: COMMERCIAL

## 2024-04-19 ENCOUNTER — ANESTHESIA (OUTPATIENT)
Dept: OPERATING ROOM | Facility: HOSPITAL | Age: 64
DRG: 473 | End: 2024-04-19
Payer: COMMERCIAL

## 2024-04-19 DIAGNOSIS — M48.02 CERVICAL SPINAL STENOSIS: Primary | ICD-10-CM

## 2024-04-19 DIAGNOSIS — G89.18 POST-OP PAIN: ICD-10-CM

## 2024-04-19 LAB
ABO GROUP (TYPE) IN BLOOD: NORMAL
GLUCOSE BLD MANUAL STRIP-MCNC: 109 MG/DL (ref 74–99)
RH FACTOR (ANTIGEN D): NORMAL

## 2024-04-19 PROCEDURE — A4649 SURGICAL SUPPLIES: HCPCS | Performed by: ORTHOPAEDIC SURGERY

## 2024-04-19 PROCEDURE — 2500000004 HC RX 250 GENERAL PHARMACY W/ HCPCS (ALT 636 FOR OP/ED): Performed by: PHYSICIAN ASSISTANT

## 2024-04-19 PROCEDURE — 2500000004 HC RX 250 GENERAL PHARMACY W/ HCPCS (ALT 636 FOR OP/ED): Mod: JZ | Performed by: PHYSICIAN ASSISTANT

## 2024-04-19 PROCEDURE — 2500000004 HC RX 250 GENERAL PHARMACY W/ HCPCS (ALT 636 FOR OP/ED): Performed by: ANESTHESIOLOGY

## 2024-04-19 PROCEDURE — 7100000002 HC RECOVERY ROOM TIME - EACH INCREMENTAL 1 MINUTE: Performed by: ORTHOPAEDIC SURGERY

## 2024-04-19 PROCEDURE — 97165 OT EVAL LOW COMPLEX 30 MIN: CPT | Mod: GO

## 2024-04-19 PROCEDURE — 96368 THER/DIAG CONCURRENT INF: CPT | Mod: 59

## 2024-04-19 PROCEDURE — 2500000004 HC RX 250 GENERAL PHARMACY W/ HCPCS (ALT 636 FOR OP/ED): Mod: JZ | Performed by: ORTHOPAEDIC SURGERY

## 2024-04-19 PROCEDURE — 82947 ASSAY GLUCOSE BLOOD QUANT: CPT

## 2024-04-19 PROCEDURE — A22551 PR ARTHRODESIS ANT INTERBODY INC DISCECTOMY, CERVICAL BELOW C2: Performed by: ANESTHESIOLOGIST ASSISTANT

## 2024-04-19 PROCEDURE — A4217 STERILE WATER/SALINE, 500 ML: HCPCS | Performed by: PHYSICIAN ASSISTANT

## 2024-04-19 PROCEDURE — 7100000001 HC RECOVERY ROOM TIME - INITIAL BASE CHARGE: Performed by: ORTHOPAEDIC SURGERY

## 2024-04-19 PROCEDURE — 97161 PT EVAL LOW COMPLEX 20 MIN: CPT | Mod: GP

## 2024-04-19 PROCEDURE — 3700000001 HC GENERAL ANESTHESIA TIME - INITIAL BASE CHARGE: Performed by: ORTHOPAEDIC SURGERY

## 2024-04-19 PROCEDURE — 1100000001 HC PRIVATE ROOM DAILY

## 2024-04-19 PROCEDURE — 3600000004 HC OR TIME - INITIAL BASE CHARGE - PROCEDURE LEVEL FOUR: Performed by: ORTHOPAEDIC SURGERY

## 2024-04-19 PROCEDURE — 2500000005 HC RX 250 GENERAL PHARMACY W/O HCPCS: Performed by: ANESTHESIOLOGIST ASSISTANT

## 2024-04-19 PROCEDURE — 2500000005 HC RX 250 GENERAL PHARMACY W/O HCPCS: Performed by: ORTHOPAEDIC SURGERY

## 2024-04-19 PROCEDURE — 2500000001 HC RX 250 WO HCPCS SELF ADMINISTERED DRUGS (ALT 637 FOR MEDICARE OP): Performed by: PHYSICIAN ASSISTANT

## 2024-04-19 PROCEDURE — 3700000002 HC GENERAL ANESTHESIA TIME - EACH INCREMENTAL 1 MINUTE: Performed by: ORTHOPAEDIC SURGERY

## 2024-04-19 PROCEDURE — 96366 THER/PROPH/DIAG IV INF ADDON: CPT | Mod: 59

## 2024-04-19 PROCEDURE — 2500000001 HC RX 250 WO HCPCS SELF ADMINISTERED DRUGS (ALT 637 FOR MEDICARE OP): Performed by: ORTHOPAEDIC SURGERY

## 2024-04-19 PROCEDURE — 2720000007 HC OR 272 NO HCPCS: Performed by: ORTHOPAEDIC SURGERY

## 2024-04-19 PROCEDURE — C1713 ANCHOR/SCREW BN/BN,TIS/BN: HCPCS | Performed by: ORTHOPAEDIC SURGERY

## 2024-04-19 PROCEDURE — 3600000009 HC OR TIME - EACH INCREMENTAL 1 MINUTE - PROCEDURE LEVEL FOUR: Performed by: ORTHOPAEDIC SURGERY

## 2024-04-19 PROCEDURE — 2500000001 HC RX 250 WO HCPCS SELF ADMINISTERED DRUGS (ALT 637 FOR MEDICARE OP): Performed by: ANESTHESIOLOGY

## 2024-04-19 PROCEDURE — G0378 HOSPITAL OBSERVATION PER HR: HCPCS

## 2024-04-19 PROCEDURE — 2500000004 HC RX 250 GENERAL PHARMACY W/ HCPCS (ALT 636 FOR OP/ED): Performed by: ANESTHESIOLOGIST ASSISTANT

## 2024-04-19 PROCEDURE — A22551 PR ARTHRODESIS ANT INTERBODY INC DISCECTOMY, CERVICAL BELOW C2: Performed by: ANESTHESIOLOGY

## 2024-04-19 PROCEDURE — 36415 COLL VENOUS BLD VENIPUNCTURE: CPT | Performed by: ORTHOPAEDIC SURGERY

## 2024-04-19 PROCEDURE — 2500000004 HC RX 250 GENERAL PHARMACY W/ HCPCS (ALT 636 FOR OP/ED): Performed by: ORTHOPAEDIC SURGERY

## 2024-04-19 PROCEDURE — 2780000003 HC OR 278 NO HCPCS: Performed by: ORTHOPAEDIC SURGERY

## 2024-04-19 PROCEDURE — C1821 INTERSPINOUS IMPLANT: HCPCS | Performed by: ORTHOPAEDIC SURGERY

## 2024-04-19 PROCEDURE — 96365 THER/PROPH/DIAG IV INF INIT: CPT | Mod: 59

## 2024-04-19 DEVICE — SPACER 6287841 PSR LAT PORTS 8X14X11
Type: IMPLANTABLE DEVICE | Site: NECK | Status: FUNCTIONAL
Brand: VERTE-STACK® SPINAL SYSTEM

## 2024-04-19 DEVICE — IMPLANTABLE DEVICE: Type: IMPLANTABLE DEVICE | Site: SPINE CERVICAL | Status: FUNCTIONAL

## 2024-04-19 DEVICE — PLATE 7200023 ATL VISION ELITE 23MM
Type: IMPLANTABLE DEVICE | Site: NECK | Status: FUNCTIONAL
Brand: ATLANTIS® ANTERIOR CERVICAL PLATE SYSTEM

## 2024-04-19 RX ORDER — CEFAZOLIN SODIUM 2 G/100ML
2 INJECTION, SOLUTION INTRAVENOUS
Status: COMPLETED | OUTPATIENT
Start: 2024-04-19 | End: 2024-04-19

## 2024-04-19 RX ORDER — MORPHINE SULFATE IN 0.9 % NACL 30 MG/30ML
PATIENT CONTROLLED ANALGESIA SYRINGE INTRAVENOUS CONTINUOUS
Status: DISCONTINUED | OUTPATIENT
Start: 2024-04-19 | End: 2024-04-20

## 2024-04-19 RX ORDER — DIPHENHYDRAMINE HYDROCHLORIDE 50 MG/ML
12.5 INJECTION INTRAMUSCULAR; INTRAVENOUS ONCE AS NEEDED
Status: DISCONTINUED | OUTPATIENT
Start: 2024-04-19 | End: 2024-04-19 | Stop reason: HOSPADM

## 2024-04-19 RX ORDER — DIPHENHYDRAMINE HYDROCHLORIDE 50 MG/ML
12.5 INJECTION INTRAMUSCULAR; INTRAVENOUS EVERY 6 HOURS PRN
Status: DISCONTINUED | OUTPATIENT
Start: 2024-04-19 | End: 2024-04-20 | Stop reason: HOSPADM

## 2024-04-19 RX ORDER — FENTANYL CITRATE 50 UG/ML
INJECTION, SOLUTION INTRAMUSCULAR; INTRAVENOUS AS NEEDED
Status: DISCONTINUED | OUTPATIENT
Start: 2024-04-19 | End: 2024-04-19

## 2024-04-19 RX ORDER — ONDANSETRON HYDROCHLORIDE 2 MG/ML
INJECTION, SOLUTION INTRAVENOUS AS NEEDED
Status: DISCONTINUED | OUTPATIENT
Start: 2024-04-19 | End: 2024-04-19

## 2024-04-19 RX ORDER — HYDRALAZINE HYDROCHLORIDE 20 MG/ML
10 INJECTION INTRAMUSCULAR; INTRAVENOUS EVERY 30 MIN PRN
Status: DISCONTINUED | OUTPATIENT
Start: 2024-04-19 | End: 2024-04-19 | Stop reason: HOSPADM

## 2024-04-19 RX ORDER — SCOLOPAMINE TRANSDERMAL SYSTEM 1 MG/1
1 PATCH, EXTENDED RELEASE TRANSDERMAL
Status: DISCONTINUED | OUTPATIENT
Start: 2024-04-19 | End: 2024-04-20 | Stop reason: HOSPADM

## 2024-04-19 RX ORDER — BISACODYL 5 MG
10 TABLET, DELAYED RELEASE (ENTERIC COATED) ORAL DAILY PRN
Status: DISCONTINUED | OUTPATIENT
Start: 2024-04-19 | End: 2024-04-20 | Stop reason: HOSPADM

## 2024-04-19 RX ORDER — FAMOTIDINE 20 MG/1
20 TABLET, FILM COATED ORAL ONCE
Status: COMPLETED | OUTPATIENT
Start: 2024-04-19 | End: 2024-04-19

## 2024-04-19 RX ORDER — CYCLOBENZAPRINE HCL 10 MG
10 TABLET ORAL 3 TIMES DAILY PRN
Status: DISCONTINUED | OUTPATIENT
Start: 2024-04-19 | End: 2024-04-20 | Stop reason: HOSPADM

## 2024-04-19 RX ORDER — NORETHINDRONE AND ETHINYL ESTRADIOL 0.5-0.035
KIT ORAL AS NEEDED
Status: DISCONTINUED | OUTPATIENT
Start: 2024-04-19 | End: 2024-04-19

## 2024-04-19 RX ORDER — SODIUM CHLORIDE, SODIUM LACTATE, POTASSIUM CHLORIDE, CALCIUM CHLORIDE 600; 310; 30; 20 MG/100ML; MG/100ML; MG/100ML; MG/100ML
100 INJECTION, SOLUTION INTRAVENOUS CONTINUOUS
Status: DISCONTINUED | OUTPATIENT
Start: 2024-04-19 | End: 2024-04-19

## 2024-04-19 RX ORDER — HYDROCODONE BITARTRATE AND ACETAMINOPHEN 5; 325 MG/1; MG/1
1 TABLET ORAL EVERY 4 HOURS PRN
Status: DISCONTINUED | OUTPATIENT
Start: 2024-04-19 | End: 2024-04-20 | Stop reason: HOSPADM

## 2024-04-19 RX ORDER — ONDANSETRON 4 MG/1
4 TABLET, ORALLY DISINTEGRATING ORAL EVERY 8 HOURS PRN
Status: DISCONTINUED | OUTPATIENT
Start: 2024-04-19 | End: 2024-04-20 | Stop reason: HOSPADM

## 2024-04-19 RX ORDER — PROMETHAZINE HYDROCHLORIDE 25 MG/1
25 SUPPOSITORY RECTAL EVERY 12 HOURS PRN
Status: DISCONTINUED | OUTPATIENT
Start: 2024-04-19 | End: 2024-04-20 | Stop reason: HOSPADM

## 2024-04-19 RX ORDER — GABAPENTIN 400 MG/1
400 CAPSULE ORAL 2 TIMES DAILY
Status: DISCONTINUED | OUTPATIENT
Start: 2024-04-19 | End: 2024-04-20 | Stop reason: HOSPADM

## 2024-04-19 RX ORDER — METOCLOPRAMIDE 10 MG/1
10 TABLET ORAL ONCE
Status: COMPLETED | OUTPATIENT
Start: 2024-04-19 | End: 2024-04-19

## 2024-04-19 RX ORDER — PROPOFOL 10 MG/ML
INJECTION, EMULSION INTRAVENOUS AS NEEDED
Status: DISCONTINUED | OUTPATIENT
Start: 2024-04-19 | End: 2024-04-19

## 2024-04-19 RX ORDER — LIDOCAINE HYDROCHLORIDE 10 MG/ML
INJECTION INFILTRATION; PERINEURAL AS NEEDED
Status: DISCONTINUED | OUTPATIENT
Start: 2024-04-19 | End: 2024-04-19

## 2024-04-19 RX ORDER — METFORMIN HYDROCHLORIDE 750 MG/1
750 TABLET, EXTENDED RELEASE ORAL 2 TIMES DAILY
Status: DISCONTINUED | OUTPATIENT
Start: 2024-04-19 | End: 2024-04-19

## 2024-04-19 RX ORDER — LISINOPRIL 20 MG/1
20 TABLET ORAL DAILY
Status: DISCONTINUED | OUTPATIENT
Start: 2024-04-19 | End: 2024-04-20 | Stop reason: HOSPADM

## 2024-04-19 RX ORDER — POLYETHYLENE GLYCOL 3350 17 G/17G
17 POWDER, FOR SOLUTION ORAL 2 TIMES DAILY
Status: DISCONTINUED | OUTPATIENT
Start: 2024-04-19 | End: 2024-04-20 | Stop reason: HOSPADM

## 2024-04-19 RX ORDER — ALBUTEROL SULFATE 0.83 MG/ML
2.5 SOLUTION RESPIRATORY (INHALATION) ONCE
Status: DISCONTINUED | OUTPATIENT
Start: 2024-04-19 | End: 2024-04-19 | Stop reason: HOSPADM

## 2024-04-19 RX ORDER — CEFAZOLIN SODIUM 2 G/100ML
2 INJECTION, SOLUTION INTRAVENOUS EVERY 6 HOURS
Qty: 300 ML | Refills: 0 | Status: COMPLETED | OUTPATIENT
Start: 2024-04-19 | End: 2024-04-20

## 2024-04-19 RX ORDER — PROMETHAZINE HYDROCHLORIDE 25 MG/1
25 TABLET ORAL EVERY 6 HOURS PRN
Status: DISCONTINUED | OUTPATIENT
Start: 2024-04-19 | End: 2024-04-20 | Stop reason: HOSPADM

## 2024-04-19 RX ORDER — GABAPENTIN 300 MG/1
300 CAPSULE ORAL ONCE
Status: COMPLETED | OUTPATIENT
Start: 2024-04-19 | End: 2024-04-19

## 2024-04-19 RX ORDER — ROCURONIUM BROMIDE 10 MG/ML
INJECTION, SOLUTION INTRAVENOUS AS NEEDED
Status: DISCONTINUED | OUTPATIENT
Start: 2024-04-19 | End: 2024-04-19

## 2024-04-19 RX ORDER — PANTOPRAZOLE SODIUM 40 MG/1
40 TABLET, DELAYED RELEASE ORAL DAILY
Status: DISCONTINUED | OUTPATIENT
Start: 2024-04-19 | End: 2024-04-20 | Stop reason: HOSPADM

## 2024-04-19 RX ORDER — DEXTROSE 50 % IN WATER (D50W) INTRAVENOUS SYRINGE
12.5
Status: DISCONTINUED | OUTPATIENT
Start: 2024-04-19 | End: 2024-04-20 | Stop reason: HOSPADM

## 2024-04-19 RX ORDER — LABETALOL HYDROCHLORIDE 5 MG/ML
10 INJECTION, SOLUTION INTRAVENOUS ONCE AS NEEDED
Status: DISCONTINUED | OUTPATIENT
Start: 2024-04-19 | End: 2024-04-19 | Stop reason: HOSPADM

## 2024-04-19 RX ORDER — NALOXONE HYDROCHLORIDE 0.4 MG/ML
0.2 INJECTION, SOLUTION INTRAMUSCULAR; INTRAVENOUS; SUBCUTANEOUS AS NEEDED
Status: DISCONTINUED | OUTPATIENT
Start: 2024-04-19 | End: 2024-04-20

## 2024-04-19 RX ORDER — ALBUTEROL SULFATE 0.83 MG/ML
2.5 SOLUTION RESPIRATORY (INHALATION) ONCE AS NEEDED
Status: DISCONTINUED | OUTPATIENT
Start: 2024-04-19 | End: 2024-04-19 | Stop reason: HOSPADM

## 2024-04-19 RX ORDER — OXYCODONE HCL 10 MG/1
10 TABLET, FILM COATED, EXTENDED RELEASE ORAL ONCE AS NEEDED
Status: DISCONTINUED | OUTPATIENT
Start: 2024-04-19 | End: 2024-04-20 | Stop reason: HOSPADM

## 2024-04-19 RX ORDER — MIDAZOLAM HYDROCHLORIDE 1 MG/ML
INJECTION, SOLUTION INTRAMUSCULAR; INTRAVENOUS AS NEEDED
Status: DISCONTINUED | OUTPATIENT
Start: 2024-04-19 | End: 2024-04-19

## 2024-04-19 RX ORDER — LABETALOL HYDROCHLORIDE 5 MG/ML
INJECTION, SOLUTION INTRAVENOUS AS NEEDED
Status: DISCONTINUED | OUTPATIENT
Start: 2024-04-19 | End: 2024-04-19

## 2024-04-19 RX ORDER — METHYLPREDNISOLONE ACETATE 40 MG/ML
INJECTION, SUSPENSION INTRA-ARTICULAR; INTRALESIONAL; INTRAMUSCULAR; SOFT TISSUE AS NEEDED
Status: DISCONTINUED | OUTPATIENT
Start: 2024-04-19 | End: 2024-04-19 | Stop reason: HOSPADM

## 2024-04-19 RX ORDER — ALUMINUM HYDROXIDE, MAGNESIUM HYDROXIDE, AND SIMETHICONE 1200; 120; 1200 MG/30ML; MG/30ML; MG/30ML
30 SUSPENSION ORAL EVERY 6 HOURS PRN
Status: DISCONTINUED | OUTPATIENT
Start: 2024-04-19 | End: 2024-04-20 | Stop reason: HOSPADM

## 2024-04-19 RX ORDER — VANCOMYCIN HYDROCHLORIDE 1 G/20ML
INJECTION, POWDER, LYOPHILIZED, FOR SOLUTION INTRAVENOUS AS NEEDED
Status: DISCONTINUED | OUTPATIENT
Start: 2024-04-19 | End: 2024-04-19 | Stop reason: HOSPADM

## 2024-04-19 RX ORDER — PHENYLEPHRINE HCL IN 0.9% NACL 1 MG/10 ML
SYRINGE (ML) INTRAVENOUS AS NEEDED
Status: DISCONTINUED | OUTPATIENT
Start: 2024-04-19 | End: 2024-04-19

## 2024-04-19 RX ORDER — ACETAMINOPHEN 325 MG/1
975 TABLET ORAL ONCE
Status: COMPLETED | OUTPATIENT
Start: 2024-04-19 | End: 2024-04-19

## 2024-04-19 RX ORDER — ATORVASTATIN CALCIUM 80 MG/1
80 TABLET, FILM COATED ORAL NIGHTLY
Status: DISCONTINUED | OUTPATIENT
Start: 2024-04-19 | End: 2024-04-20 | Stop reason: HOSPADM

## 2024-04-19 RX ORDER — TAMSULOSIN HYDROCHLORIDE 0.4 MG/1
0.4 CAPSULE ORAL DAILY
Status: DISCONTINUED | OUTPATIENT
Start: 2024-04-19 | End: 2024-04-20 | Stop reason: HOSPADM

## 2024-04-19 RX ORDER — SODIUM CHLORIDE, SODIUM LACTATE, POTASSIUM CHLORIDE, CALCIUM CHLORIDE 600; 310; 30; 20 MG/100ML; MG/100ML; MG/100ML; MG/100ML
100 INJECTION, SOLUTION INTRAVENOUS CONTINUOUS
Status: DISCONTINUED | OUTPATIENT
Start: 2024-04-19 | End: 2024-04-20 | Stop reason: HOSPADM

## 2024-04-19 RX ORDER — DEXTROSE 50 % IN WATER (D50W) INTRAVENOUS SYRINGE
25
Status: DISCONTINUED | OUTPATIENT
Start: 2024-04-19 | End: 2024-04-20 | Stop reason: HOSPADM

## 2024-04-19 RX ORDER — HYDROCODONE BITARTRATE AND ACETAMINOPHEN 5; 325 MG/1; MG/1
2 TABLET ORAL EVERY 4 HOURS PRN
Status: DISCONTINUED | OUTPATIENT
Start: 2024-04-19 | End: 2024-04-20 | Stop reason: HOSPADM

## 2024-04-19 RX ORDER — ONDANSETRON HYDROCHLORIDE 2 MG/ML
4 INJECTION, SOLUTION INTRAVENOUS EVERY 8 HOURS PRN
Status: DISCONTINUED | OUTPATIENT
Start: 2024-04-19 | End: 2024-04-20 | Stop reason: HOSPADM

## 2024-04-19 RX ORDER — DOCUSATE SODIUM 100 MG/1
100 CAPSULE, LIQUID FILLED ORAL 2 TIMES DAILY
Status: DISCONTINUED | OUTPATIENT
Start: 2024-04-19 | End: 2024-04-20 | Stop reason: HOSPADM

## 2024-04-19 RX ORDER — OMEPRAZOLE 20 MG/1
20 CAPSULE, DELAYED RELEASE ORAL DAILY
Status: DISCONTINUED | OUTPATIENT
Start: 2024-04-19 | End: 2024-04-19

## 2024-04-19 RX ORDER — NALOXONE HYDROCHLORIDE 0.4 MG/ML
0.2 INJECTION, SOLUTION INTRAMUSCULAR; INTRAVENOUS; SUBCUTANEOUS EVERY 5 MIN PRN
Status: DISCONTINUED | OUTPATIENT
Start: 2024-04-19 | End: 2024-04-20 | Stop reason: HOSPADM

## 2024-04-19 RX ORDER — ONDANSETRON HYDROCHLORIDE 2 MG/ML
4 INJECTION, SOLUTION INTRAVENOUS ONCE AS NEEDED
Status: DISCONTINUED | OUTPATIENT
Start: 2024-04-19 | End: 2024-04-19 | Stop reason: HOSPADM

## 2024-04-19 RX ADMIN — HYDROMORPHONE HYDROCHLORIDE 0.5 MG: 2 INJECTION, SOLUTION INTRAMUSCULAR; INTRAVENOUS; SUBCUTANEOUS at 10:27

## 2024-04-19 RX ADMIN — SODIUM CHLORIDE, POTASSIUM CHLORIDE, SODIUM LACTATE AND CALCIUM CHLORIDE: 600; 310; 30; 20 INJECTION, SOLUTION INTRAVENOUS at 07:28

## 2024-04-19 RX ADMIN — HYDROMORPHONE HYDROCHLORIDE 0.5 MG: 2 INJECTION, SOLUTION INTRAMUSCULAR; INTRAVENOUS; SUBCUTANEOUS at 10:02

## 2024-04-19 RX ADMIN — CEFAZOLIN SODIUM 2 G: 2 INJECTION, SOLUTION INTRAVENOUS at 15:11

## 2024-04-19 RX ADMIN — CEFAZOLIN SODIUM 2 G: 2 INJECTION, SOLUTION INTRAVENOUS at 07:43

## 2024-04-19 RX ADMIN — GABAPENTIN 400 MG: 400 CAPSULE ORAL at 21:24

## 2024-04-19 RX ADMIN — Medication 150 MCG: at 08:24

## 2024-04-19 RX ADMIN — HYDROMORPHONE HYDROCHLORIDE 0.5 MG: 2 INJECTION, SOLUTION INTRAMUSCULAR; INTRAVENOUS; SUBCUTANEOUS at 10:17

## 2024-04-19 RX ADMIN — ROCURONIUM BROMIDE 50 MG: 10 INJECTION, SOLUTION INTRAVENOUS at 07:40

## 2024-04-19 RX ADMIN — FENTANYL CITRATE 50 MCG: 0.05 INJECTION, SOLUTION INTRAMUSCULAR; INTRAVENOUS at 08:07

## 2024-04-19 RX ADMIN — Medication 50 MCG: at 09:14

## 2024-04-19 RX ADMIN — LABETALOL HYDROCHLORIDE 5 MG: 5 INJECTION, SOLUTION INTRAVENOUS at 08:11

## 2024-04-19 RX ADMIN — METOCLOPRAMIDE 10 MG: 10 TABLET ORAL at 07:21

## 2024-04-19 RX ADMIN — HYDROMORPHONE HYDROCHLORIDE 0.5 MG: 2 INJECTION, SOLUTION INTRAMUSCULAR; INTRAVENOUS; SUBCUTANEOUS at 10:07

## 2024-04-19 RX ADMIN — Medication: at 10:44

## 2024-04-19 RX ADMIN — EPHEDRINE SULFATE 10 MG: 50 INJECTION, SOLUTION INTRAVENOUS at 08:19

## 2024-04-19 RX ADMIN — DEXAMETHASONE SODIUM PHOSPHATE 8 MG: 4 INJECTION, SOLUTION INTRAMUSCULAR; INTRAVENOUS at 07:46

## 2024-04-19 RX ADMIN — ROCURONIUM BROMIDE 10 MG: 10 INJECTION, SOLUTION INTRAVENOUS at 08:53

## 2024-04-19 RX ADMIN — Medication 50 MCG: at 09:16

## 2024-04-19 RX ADMIN — ROCURONIUM BROMIDE 10 MG: 10 INJECTION, SOLUTION INTRAVENOUS at 08:31

## 2024-04-19 RX ADMIN — DOCUSATE SODIUM 100 MG: 100 CAPSULE, LIQUID FILLED ORAL at 21:24

## 2024-04-19 RX ADMIN — FENTANYL CITRATE 25 MCG: 0.05 INJECTION, SOLUTION INTRAMUSCULAR; INTRAVENOUS at 09:26

## 2024-04-19 RX ADMIN — SODIUM CHLORIDE, SODIUM LACTATE, POTASSIUM CHLORIDE, AND CALCIUM CHLORIDE 100 ML/HR: 600; 310; 30; 20 INJECTION, SOLUTION INTRAVENOUS at 11:32

## 2024-04-19 RX ADMIN — LIDOCAINE HYDROCHLORIDE 50 MG: 10 INJECTION, SOLUTION INFILTRATION; PERINEURAL at 07:40

## 2024-04-19 RX ADMIN — PROPOFOL 200 MG: 10 INJECTION, EMULSION INTRAVENOUS at 07:40

## 2024-04-19 RX ADMIN — POLYETHYLENE GLYCOL 3350 17 G: 17 POWDER, FOR SOLUTION ORAL at 21:25

## 2024-04-19 RX ADMIN — MIDAZOLAM HYDROCHLORIDE 2 MG: 1 INJECTION, SOLUTION INTRAMUSCULAR; INTRAVENOUS at 07:28

## 2024-04-19 RX ADMIN — CEFAZOLIN SODIUM 2 G: 2 INJECTION, SOLUTION INTRAVENOUS at 21:23

## 2024-04-19 RX ADMIN — ONDANSETRON HYDROCHLORIDE 4 MG: 2 INJECTION INTRAMUSCULAR; INTRAVENOUS at 07:46

## 2024-04-19 RX ADMIN — GABAPENTIN 300 MG: 300 CAPSULE ORAL at 06:14

## 2024-04-19 RX ADMIN — Medication 4 L/MIN: at 11:45

## 2024-04-19 RX ADMIN — ACETAMINOPHEN 975 MG: 325 TABLET ORAL at 06:14

## 2024-04-19 RX ADMIN — SUGAMMADEX 200 MG: 100 INJECTION, SOLUTION INTRAVENOUS at 09:36

## 2024-04-19 RX ADMIN — ATORVASTATIN CALCIUM 80 MG: 80 TABLET, FILM COATED ORAL at 21:23

## 2024-04-19 RX ADMIN — FENTANYL CITRATE 25 MCG: 0.05 INJECTION, SOLUTION INTRAMUSCULAR; INTRAVENOUS at 08:34

## 2024-04-19 RX ADMIN — HYDROCODONE BITARTRATE AND ACETAMINOPHEN 1 TABLET: 5; 325 TABLET ORAL at 17:48

## 2024-04-19 RX ADMIN — FENTANYL CITRATE 100 MCG: 0.05 INJECTION, SOLUTION INTRAMUSCULAR; INTRAVENOUS at 07:40

## 2024-04-19 RX ADMIN — EPHEDRINE SULFATE 10 MG: 50 INJECTION, SOLUTION INTRAVENOUS at 08:27

## 2024-04-19 RX ADMIN — FAMOTIDINE 20 MG: 20 TABLET ORAL at 07:21

## 2024-04-19 RX ADMIN — HYDROCODONE BITARTRATE AND ACETAMINOPHEN 1 TABLET: 5; 325 TABLET ORAL at 23:48

## 2024-04-19 SDOH — HEALTH STABILITY: MENTAL HEALTH: CURRENT SMOKER: 0

## 2024-04-19 SDOH — SOCIAL STABILITY: SOCIAL INSECURITY: DO YOU FEEL UNSAFE GOING BACK TO THE PLACE WHERE YOU ARE LIVING?: NO

## 2024-04-19 SDOH — SOCIAL STABILITY: SOCIAL INSECURITY: ABUSE: ADULT

## 2024-04-19 SDOH — SOCIAL STABILITY: SOCIAL INSECURITY: ARE THERE ANY APPARENT SIGNS OF INJURIES/BEHAVIORS THAT COULD BE RELATED TO ABUSE/NEGLECT?: NO

## 2024-04-19 SDOH — SOCIAL STABILITY: SOCIAL INSECURITY: HAVE YOU HAD THOUGHTS OF HARMING ANYONE ELSE?: NO

## 2024-04-19 SDOH — SOCIAL STABILITY: SOCIAL INSECURITY: HAVE YOU HAD ANY THOUGHTS OF HARMING ANYONE ELSE?: NO

## 2024-04-19 SDOH — SOCIAL STABILITY: SOCIAL INSECURITY: ARE YOU OR HAVE YOU BEEN THREATENED OR ABUSED PHYSICALLY, EMOTIONALLY, OR SEXUALLY BY ANYONE?: NO

## 2024-04-19 SDOH — SOCIAL STABILITY: SOCIAL INSECURITY: DO YOU FEEL ANYONE HAS EXPLOITED OR TAKEN ADVANTAGE OF YOU FINANCIALLY OR OF YOUR PERSONAL PROPERTY?: NO

## 2024-04-19 SDOH — SOCIAL STABILITY: SOCIAL INSECURITY: HAS ANYONE EVER THREATENED TO HURT YOUR FAMILY OR YOUR PETS?: NO

## 2024-04-19 SDOH — SOCIAL STABILITY: SOCIAL INSECURITY: WERE YOU ABLE TO COMPLETE ALL THE BEHAVIORAL HEALTH SCREENINGS?: YES

## 2024-04-19 SDOH — SOCIAL STABILITY: SOCIAL INSECURITY: DOES ANYONE TRY TO KEEP YOU FROM HAVING/CONTACTING OTHER FRIENDS OR DOING THINGS OUTSIDE YOUR HOME?: NO

## 2024-04-19 ASSESSMENT — PAIN DESCRIPTION - DESCRIPTORS
DESCRIPTORS: ACHING

## 2024-04-19 ASSESSMENT — COGNITIVE AND FUNCTIONAL STATUS - GENERAL
DAILY ACTIVITIY SCORE: 19
TURNING FROM BACK TO SIDE WHILE IN FLAT BAD: A LITTLE
HELP NEEDED FOR BATHING: A LITTLE
DAILY ACTIVITIY SCORE: 19
MOBILITY SCORE: 18
DRESSING REGULAR UPPER BODY CLOTHING: A LITTLE
MOVING FROM LYING ON BACK TO SITTING ON SIDE OF FLAT BED WITH BEDRAILS: A LITTLE
DRESSING REGULAR UPPER BODY CLOTHING: A LITTLE
MOVING FROM LYING ON BACK TO SITTING ON SIDE OF FLAT BED WITH BEDRAILS: A LITTLE
STANDING UP FROM CHAIR USING ARMS: A LITTLE
DAILY ACTIVITIY SCORE: 19
TOILETING: A LITTLE
MOVING TO AND FROM BED TO CHAIR: A LITTLE
MOBILITY SCORE: 18
CLIMB 3 TO 5 STEPS WITH RAILING: A LITTLE
MOBILITY SCORE: 18
STANDING UP FROM CHAIR USING ARMS: A LITTLE
WALKING IN HOSPITAL ROOM: A LITTLE
PERSONAL GROOMING: A LITTLE
MOVING TO AND FROM BED TO CHAIR: A LITTLE
TOILETING: A LITTLE
DRESSING REGULAR LOWER BODY CLOTHING: A LITTLE
CLIMB 3 TO 5 STEPS WITH RAILING: A LITTLE
WALKING IN HOSPITAL ROOM: A LITTLE
PERSONAL GROOMING: A LITTLE
TURNING FROM BACK TO SIDE WHILE IN FLAT BAD: A LITTLE
DRESSING REGULAR UPPER BODY CLOTHING: A LITTLE
CLIMB 3 TO 5 STEPS WITH RAILING: A LITTLE
TURNING FROM BACK TO SIDE WHILE IN FLAT BAD: A LITTLE
WALKING IN HOSPITAL ROOM: A LITTLE
PATIENT BASELINE BEDBOUND: NO
MOVING FROM LYING ON BACK TO SITTING ON SIDE OF FLAT BED WITH BEDRAILS: A LITTLE
STANDING UP FROM CHAIR USING ARMS: A LITTLE
PERSONAL GROOMING: A LITTLE
HELP NEEDED FOR BATHING: A LITTLE
DRESSING REGULAR LOWER BODY CLOTHING: A LITTLE
DRESSING REGULAR LOWER BODY CLOTHING: A LITTLE
TOILETING: A LITTLE
MOVING TO AND FROM BED TO CHAIR: A LITTLE
HELP NEEDED FOR BATHING: A LITTLE

## 2024-04-19 ASSESSMENT — ACTIVITIES OF DAILY LIVING (ADL)
ADEQUATE_TO_COMPLETE_ADL: YES
ADL_ASSISTANCE: INDEPENDENT
JUDGMENT_ADEQUATE_SAFELY_COMPLETE_DAILY_ACTIVITIES: YES
DRESSING YOURSELF: INDEPENDENT
GROOMING: INDEPENDENT
PATIENT'S MEMORY ADEQUATE TO SAFELY COMPLETE DAILY ACTIVITIES?: YES
BATHING: INDEPENDENT
ADL_ASSISTANCE: INDEPENDENT
BATHING_ASSISTANCE: MINIMAL
LACK_OF_TRANSPORTATION: NO
FEEDING YOURSELF: INDEPENDENT
HEARING - LEFT EAR: FUNCTIONAL
HEARING - RIGHT EAR: FUNCTIONAL
WALKS IN HOME: INDEPENDENT
TOILETING: INDEPENDENT

## 2024-04-19 ASSESSMENT — LIFESTYLE VARIABLES
SUBSTANCE_ABUSE_PAST_12_MONTHS: NO
HOW OFTEN DO YOU HAVE 6 OR MORE DRINKS ON ONE OCCASION: NEVER
AUDIT-C TOTAL SCORE: 0
SKIP TO QUESTIONS 9-10: 1
PRESCIPTION_ABUSE_PAST_12_MONTHS: NO
HOW OFTEN DO YOU HAVE A DRINK CONTAINING ALCOHOL: NEVER
AUDIT-C TOTAL SCORE: 0
HOW MANY STANDARD DRINKS CONTAINING ALCOHOL DO YOU HAVE ON A TYPICAL DAY: PATIENT DOES NOT DRINK

## 2024-04-19 ASSESSMENT — PAIN SCALES - GENERAL
PAINLEVEL_OUTOF10: 10 - WORST POSSIBLE PAIN
PAINLEVEL_OUTOF10: 2
PAIN_LEVEL: 2
PAINLEVEL_OUTOF10: 3
PAINLEVEL_OUTOF10: 6
PAINLEVEL_OUTOF10: 5 - MODERATE PAIN
PAINLEVEL_OUTOF10: 7
PAINLEVEL_OUTOF10: 8
PAINLEVEL_OUTOF10: 3
PAINLEVEL_OUTOF10: 2
PAINLEVEL_OUTOF10: 5 - MODERATE PAIN
PAINLEVEL_OUTOF10: 10 - WORST POSSIBLE PAIN
PAINLEVEL_OUTOF10: 0 - NO PAIN
PAINLEVEL_OUTOF10: 3

## 2024-04-19 ASSESSMENT — COLUMBIA-SUICIDE SEVERITY RATING SCALE - C-SSRS
6. HAVE YOU EVER DONE ANYTHING, STARTED TO DO ANYTHING, OR PREPARED TO DO ANYTHING TO END YOUR LIFE?: NO
2. HAVE YOU ACTUALLY HAD ANY THOUGHTS OF KILLING YOURSELF?: NO
1. IN THE PAST MONTH, HAVE YOU WISHED YOU WERE DEAD OR WISHED YOU COULD GO TO SLEEP AND NOT WAKE UP?: NO

## 2024-04-19 ASSESSMENT — PAIN DESCRIPTION - LOCATION
LOCATION: BACK
LOCATION: NECK
LOCATION: BACK

## 2024-04-19 ASSESSMENT — PAIN DESCRIPTION - ORIENTATION: ORIENTATION: POSTERIOR

## 2024-04-19 ASSESSMENT — PATIENT HEALTH QUESTIONNAIRE - PHQ9
SUM OF ALL RESPONSES TO PHQ9 QUESTIONS 1 & 2: 0
2. FEELING DOWN, DEPRESSED OR HOPELESS: NOT AT ALL
1. LITTLE INTEREST OR PLEASURE IN DOING THINGS: NOT AT ALL

## 2024-04-19 NOTE — PROGRESS NOTES
Evaluation    Patient Name: Doug Encinas  MRN: [id number]  Today's Date: 4/19/2024  Time Calculation  Start Time: 1336  Stop Time: 1354  Time Calculation (min): 18 min    Assessment  IP OT Assessment  OT Assessment: 63 yo male who underwent an elective anterior C5-6 discectomy and fusion with cage and plate presents below baseline functional status d/t pain and surgical precautions restricting mobility as well as impaired activity tolerance and generalized weakness postop.  OT to address deficits by providing ADL retraining utilizing compensatory techniques and progressive strengthening in order to maximize functional capacity and safety with mobility.  Prognosis: Good  Barriers to Discharge: None  Evaluation/Treatment Tolerance: Patient tolerated treatment well  Medical Staff Made Aware: Yes  End of Session Communication: Bedside nurse  End of Session Patient Position: Bed, 3 rail up, Alarm on  Plan:  Treatment Interventions: ADL retraining, Functional transfer training, UE strengthening/ROM, Endurance training, Patient/family training, Equipment evaluation/education, Compensatory technique education  OT Frequency: 4 times per week  OT Discharge Recommendations: Low intensity level of continued care  OT Recommended Transfer Status: Assist of 1  OT - OK to Discharge: Yes    Subjective   Current Problem:  1. Cervical spinal stenosis          General:  General  Reason for Referral: impaired ADLs s/p surgery  Referred By: Dr Garrison  Past Medical History Relevant to Rehab: BPH, DM, GERD, HLD, HTN  Family/Caregiver Present: Yes  Caregiver Feedback: Spouse = supportive  Co-Treatment: PT  Co-Treatment Reason: safety with mobility  Prior to Session Communication: Bedside nurse  Patient Position Received: Bed, 3 rail up, Alarm on  Preferred Learning Style: verbal, visual  General Comment: 63 yo male cleared for therapy by nursing and agreeable to same following elective cervical surgery this  date.    Precautions:  Hearing/Visual Limitations: +corrective lenses  Medical Precautions: Fall precautions  Post-Surgical Precautions: Spinal precautions  Precautions Comment: Aspen collar; +j tube    Pain:  Pain Assessment  Pain Assessment: 0-10  Pain Score: 3  Pain Type: Surgical pain  Pain Location: Neck  Pain Orientation: Posterior        Objective     Cognition:  Overall Cognitive Status: Within Functional Limits  Orientation Level: Oriented X4    Home Living:  Type of Home: House  Lives With: Spouse  Home Adaptive Equipment: None  Home Layout: One level  Home Access: Stairs to enter with rails  Entrance Stairs-Number of Steps: 2  Bathroom Shower/Tub: Walk-in shower  Bathroom Toilet: Standard  Bathroom Equipment: Shower chair with back     Prior Function:  Level of [place]: Independent with ADLs and functional transfers  Receives Help From: Other (Comment) (Spouse who is a nurse is taking 1 week off of work to care for patient)  ADL Assistance: Independent  Homemaking Assistance: Independent (shares with spouse)  Ambulatory Assistance: Independent  Vocational: Full time employment (works for [employer])  Leisure: enjoys logolineup  Hand Dominance: Right    ADL:  Eating Assistance: Stand by  Eating Deficit: Setup (liquid diet currently)  Grooming Assistance: Stand by  Grooming Deficit: Setup  Bathing Assistance: Minimal  Bathing Deficit: Steadying  UE Dressing Assistance: Stand by  UE Dressing Deficit: Setup  LE Dressing Assistance: Minimal  LE Dressing Deficit: Steadying  Toileting Assistance with Device: Minimal  Toileting Deficit: Steadying  ADL Comments: all per clinical judgement this date d/t POD#0    Activity Tolerance:  Endurance: Decreased tolerance for upright activites    Bed Mobility/Transfers: Bed Mobility  Bed Mobility: Yes  Bed Mobility 1  Bed Mobility 1: Supine to sitting  Level of Assistance 1: Close supervision  Bed Mobility Comments 1: toward the right with head of bed elevated ~50  degrees  Bed Mobility 2  Bed Mobility  2: Sitting to supine  Level of Assistance 2: Minimum assistance  Bed Mobility Comments 2: with cues for log roll techique    Transfers  Transfer: Yes  Transfer 1  Transfer From 1: Bed to  Transfer to 1: Stand  Technique 1: Sit to stand, Stand to sit  Transfer Level of Assistance 1: Contact guard    Functional Mobility:  Functional Mobility  Functional Mobility Performed: Yes (Min assist to ambulate ~10' with hand-held assist.)    Sensation:  Light Touch: No apparent deficits  Sensation Comment: Denies tingling/numbness    Strength:  Strength Comments: BUEs =at least 3+/5 grossly (not formally assessed d/t spinal incision/precautions)    Hand Function:  Hand Function  Gross Grasp: Functional  Coordination: Functional    Extremities: RUE   RUE : Within Functional Limits and LUE   LUE: Within Functional Limits    Outcome Measures: [facility] Daily Activity  Putting on and taking off regular lower body clothing: A little  Bathing (including washing, rinsing, drying): A little  Putting on and taking off regular upper body clothing: A little  Toileting, which includes using toilet, bedpan or urinal: A little  Taking care of personal grooming such as brushing teeth: A little  Eating Meals: None  Daily Activity - Total Score: 19    Goals:   Encounter Problems       Encounter Problems (Active)       OT Goals       ADLs (Progressing)       Start:  04/19/24    Expected End:  04/26/24       Pt will complete bathing, dressing, and toileting tasks independently using adaptive aides and with increased time as needed.         Functional transfers (Progressing)       Start:  04/19/24    Expected End:  04/26/24       Pt will complete toilet, bed, and chair transfers independently from elevated seat heights and using bilateral arm supports.         Activity tolerance (Progressing)       Start:  04/19/24    Expected End:  04/26/24       Pt will tolerate 25 minutes of therapeutic activity in order to  increase functional endurance needed for IADLs.         Precautions (Progressing)       Start:  04/19/24    Expected End:  04/26/24       Pt will demonstrate/verbalize understanding of spine precautions r/t IADLs at all times.

## 2024-04-19 NOTE — ANESTHESIA PREPROCEDURE EVALUATION
Patient: Doug Encinas    Procedure Information       Date/Time: 04/19/24 0715    Procedure: Anterior Cervical Diskectomy and Fusion Cervical 5-6, Cage, Plate (Spine Cervical) - C-arm, Vidal table, [employer]    Location: [facility] / [facility]    Surgeons: Kofi Garrison MD            Relevant Problems   Endocrine   (+) Type 2 diabetes mellitus without complication (Multi)       Clinical information reviewed:   Tobacco  Allergies  Meds  Problems  Med Hx  Surg Hx   Fam Hx  Soc   Hx        NPO Detail:  NPO/Void Status  Carbohydrate Drink Given Prior to Surgery? : N  Date of Last Liquid: 04/18/24  Time of Last Liquid: 2100  Date of Last Solid: 04/18/24  Time of Last Solid: 2030  Last Intake Type: Clear fluids  Time of Last Void: 0545         Physical Exam    Airway  Mallampati: II  TM distance: >3 FB  Neck ROM: full     Cardiovascular   Rhythm: regular  Rate: normal     Dental - normal exam     Pulmonary   Breath sounds clear to auscultation     Abdominal   Abdomen: soft             Anesthesia Plan    History of general anesthesia?: yes  History of complications of general anesthesia?: no    ASA 3     general     The patient is not a current smoker.    intravenous induction   Postoperative administration of opioids is intended.  Anesthetic plan and risks discussed with patient.    Plan discussed with CRNA, attending and CAA.

## 2024-04-19 NOTE — PROGRESS NOTES
Physical Therapy    Physical Therapy Evaluation    Patient Name: Doug Encinas  MRN: [id number]  Today's Date: 4/19/2024   Time Calculation  Start Time: 1335  Stop Time: 1355  Time Calculation (min): 20 min    Assessment/Plan   PT Assessment  PT Assessment Results: Decreased strength, Decreased endurance, Impaired balance, Decreased mobility, Decreased safety awareness, Orthopedic restrictions  Rehab Prognosis: Good  Evaluation/Treatment Tolerance: Patient limited by fatigue  Medical Staff Made Aware: Yes  End of Session Communication: Bedside nurse  End of Session Patient Position: Bed, 3 rail up, Alarm on    Assessment Comment: 65 y/o male who presents with decreased mobility due to cervical surgery. Pt would benefit from cont PT services while in-house to maximize safe, indep mobility while maintaining cervical precautions.      IP OR SWING BED PT PLAN  Inpatient or Swing Bed: Inpatient  PT Plan  Treatment/Interventions: Bed mobility, Transfer training, Gait training, Stair training, Balance training, Endurance training, Strengthening, Therapeutic exercise, Therapeutic activity  PT Plan: Skilled PT  PT Frequency: Daily  PT Discharge Recommendations: Low intensity level of continued care  PT Recommended Transfer Status: Assist x1  PT - OK to Discharge: Yes      Subjective   General Visit Information:  General  Reason for Referral: recent surgery;  anterior cervical discectomy, fusion C5-6, plate.  Referred By: Dr. Garrison  Past Medical History Relevant to Rehab: BPH, DM, GERD, HLD, HTN  Family/Caregiver Present: Yes  Caregiver Feedback: spouse at bedside  Co-Treatment: OT  Co-Treatment Reason: safety with mobility  Prior to Session Communication: Bedside nurse  Patient Position Received: Bed, 3 rail up, Alarm on  Preferred Learning Style: verbal, visual  General Comment: 65 y/o male s/p cervical surgery. Pt supine in bed with HOB elevated, Aspen collar intact. PIV, PCA pump, DANIELLA drain  Home Living:  Home Living  Type of  "Home: House  Lives With: Spouse (nurse)  Home Layout: One level  Home Access: Stairs to enter with rails  Entrance Stairs-Number of Steps: 2  Bathroom Shower/Tub: Walk-in shower  Bathroom Equipment: Shower chair with back  Prior Level of Function:  Prior Function Per Pt/Caregiver Report  Level of [place]: Independent with ADLs and functional transfers  ADL Assistance: Independent  Ambulatory Assistance: Independent  Vocational:  (Works for [employer])  Prior Function Comments: enjoys golfing and spending time with grandchildren  Precautions:  Precautions  Medical Precautions: Fall precautions  Post-Surgical Precautions: Spinal precautions  Precautions Comment: c-spine precautions, Aspen collar  Vital Signs:  Vital Signs  SpO2: 92 %  Patient Position: Sitting    Objective   Pain:  Pain Assessment  Pain Assessment: 0-10  Pain Score: 3  Pain Type: Surgical pain  Pain Location: Neck  Cognition:  Cognition  Overall Cognitive Status: Within Functional Limits  Orientation Level: Oriented X4  Attention:  (able to follow commands appropriately)    General Assessments:     Activity Tolerance  Endurance: Decreased tolerance for upright activites    Sensation  Light Touch: No apparent deficits  Sensation Comment: light touch intact B LEs    Strength  Strength Comments: B LEs > 3/5 observed  Coordination  Movements are Fluid and Coordinated: Yes    Postural Control  Posture Comment: rigid posture due to cervical surgery    Static Sitting Balance  Static Sitting-Level of Assistance: Close supervision  Static Sitting-Comment/Number of Minutes: unsupported on EOB  Dynamic Sitting Balance  Dynamic Sitting-Comments: CGA for safety    Static Standing Balance  Static Standing-Level of Assistance: Contact guard  Static Standing-Comment/Number of Minutes: HHA x 1  Dynamic Standing Balance  Dynamic Standing-Comments: no major LOB with standing marches and ambulation. HHA, slightly unsteady due to \"wooziness\" post " surgery/anesthesia  Functional Assessments:  Bed Mobility  Bed Mobility: Yes  Bed Mobility 1  Bed Mobility 1: Supine to sitting  Level of Assistance 1: Close supervision, Contact guard  Bed Mobility Comments 1: HOB max elevated. cueing to maintain cervical precautions  Bed Mobility 2  Bed Mobility  2: Sitting to supine  Level of Assistance 2: Minimum assistance  Bed Mobility Comments 2: reverse log roll technique    Transfers  Transfer: Yes  Transfer 1  Technique 1: Sit to stand  Transfer Level of Assistance 1: Contact guard  Trials/Comments 1: from elevated EOB, single HHA  Transfers 2  Technique 2: Stand to sit  Transfer Level of Assistance 2: Contact guard, Minimal verbal cues  Trials/Comments 2: cueing for hand placement and positioning    Ambulation/Gait Training  Ambulation/Gait Training Performed: Yes  Ambulation/Gait Training 1  Surface 1: Level tile  Device 1: IV Pole  Assistance 1: Hand held assistance  Quality of Gait 1:  (decreased flaquita, minimal foot clearance, decreased trunk ROM due to cervical surgery.)  Comments/Distance (ft) 1: about 20 ft  Extremity/Trunk Assessments:  Cervical Spine   Cervical Spine: Exceptions to Functional Limits  Cervical Spine Comment: Aspen collar intact; decreased movement due to cervical surgery  RLE   RLE : Within Functional Limits  LLE   LLE : Within Functional Limits  Outcome Measures:  [facility] Basic Mobility  Turning from your back to your side while in a flat bed without using bedrails: A little  Moving from lying on your back to sitting on the side of a flat bed without using bedrails: A little  Moving to and from bed to chair (including a wheelchair): A little  Standing up from a chair using your arms (e.g. wheelchair or bedside chair): A little  To walk in hospital room: A little  Climbing 3-5 steps with railing: A little  Basic Mobility - Total Score: 18    Encounter Problems       Encounter Problems (Active)       Balance       dynamic (Progressing)       Start:   04/19/24    Expected End:  04/26/24       No LOB with dynamic activities in standing/sitting.            Mobility       LTG - Patient will navigate 4-6 steps with rails/device (Progressing)       Start:  04/19/24    Expected End:  04/26/24            bed mobility (Progressing)       Start:  04/19/24    Expected End:  04/26/24       Pt will perform sup to/from sit transfer with mod indep while maintaining spinal precautions         ambulation (Progressing)       Start:  04/19/24    Expected End:  04/26/24       Pt will amb > 200  ft with LRAD and mod independence             Pain - Adult          Safety       precautions (Progressing)       Start:  04/19/24    Expected End:  04/26/24       Pt will maintain spinal precautions with all mobility.                  Education Documentation  Precautions, taught by Mara Tarango PT at 4/19/2024  2:44 PM.  Learner: Patient  Readiness: Acceptance  Method: Explanation  Response: Needs Reinforcement    Body Mechanics, taught by Mara Tarango PT at 4/19/2024  2:44 PM.  Learner: Patient  Readiness: Acceptance  Method: Explanation  Response: Needs Reinforcement    Mobility Training, taught by Mara Tarango PT at 4/19/2024  2:44 PM.  Learner: Patient  Readiness: Acceptance  Method: Explanation  Response: Needs Reinforcement    Education Comments  No comments found.

## 2024-04-19 NOTE — OP NOTE
Anterior Cervical Diskectomy and Fusion Cervical 5-6, Cage, Plate Operative Note     Date: 2024  OR Location: [facility]    Name: Doug Encinas : 1960, Age: 64 y.o., MRN: [id number], Sex: male    Diagnosis  Right C5-6 foraminal spinal stenosis with radiculopathy  Pre-op Diagnosis     * Spinal stenosis in cervical region [M48.02]     * Brachial neuritis [M54.12] Post-op Diagnosis     * Spinal stenosis in cervical region [M48.02]     * Brachial neuritis [M54.12]     Procedures  Anterior cervical discectomy and interbody fusion C5-6 with peek cage, autogenous bone graft, Atlantis Elite plate C5-6  Anterior Cervical Diskectomy and Fusion Cervical 5-6, Cage, Plate  88007 - [place] INSJ BIOMCHN DEV INTERVERTEBRAL DSC SPC W/ARTHRD    [place] ARTHRD ANT INTERBODY DECOMPRESS CERVICAL BELW C2 [71804]  [place] ANTERIOR INSTRUMENTATION 2-3 VERTEBRAL SEGMENTS [75741]  [place] AUTOGRAFT SPINE SURGERY LOCAL FROM SAME INCISION [12683]  Surgeons      * Kofi Garrison - Primary    Resident/Fellow/Other Assistant:  Fabiano WILLETT  Surgeons and Role:  * No surgeons found with a matching role *    Procedure Summary  Anesthesia: General  ASA: III  Anesthesia Staff: Anesthesiologist: Hiren Mccormack DO  C-AA: GLADYS Sarkar  Estimated Blood Loss: 5mL  Intra-op Medications:   Administrations occurring from 0715 to 1015 on 24:   Medication Name Total Dose   vancomycin (Vancocin) vial for injection 1 g   methylPREDNISolone acetate (DEPO-Medrol) injection 40 mg   ceFAZolin (Ancef) 1 g, gentamicin (Garamycin) 80 mg in sodium chloride 0.9 % 1,000 mL irrigation 1,000 mL   HYDROmorphone PF (Dilaudid) injection 0.5 mg 1 mg   lactated Ringer's infusion Cannot be calculated   ceFAZolin in dextrose (iso-os) (Ancef) IVPB 2 g 2 g   famotidine (Pepcid) tablet 20 mg 20 mg   metoclopramide (Reglan) tablet 10 mg 10 mg              Anesthesia Record               Intraprocedure I/O Totals          Intake    lactated Ringer's infusion 1400.00 mL     ceFAZolin in dextrose (iso-os) (Ancef) IVPB 2 g 100.00 mL    Total Intake 1500 mL       Output    Est. Blood Loss 15 mL    Total Output 15 mL       Net    Net Volume 1485 mL          Specimen: No specimens collected     Staff:   Circulator: Nick Perry RN  Scrub Person: Tomi Abarca RN; Oscar Dennis, IVET         Drains and/or Catheters:   Closed/Suction Drain Anterior Neck Bulb 10 Fr. (Active)       Tourniquet Times:         Implants:  Implants       Type Name Action Serial No.      Spinal Hardware SPACER, PSR LAT PORTS 8 X 14 X 11 - [id number] Implanted      Spinal Hardware PLATE, 23MM VISION ELITE - [id number] Implanted      Screw Medtronic 17mm screw Implanted               Findings: Very large osteophytes right C5-6 extending from the uncovertebral joint into the canal and behind C6 and out of the foramen    Indications: Doug Encinas is an 64 y.o. male who is having surgery for Cervical spinal stenosis with radiculopathy cervical 5-6.  The patient has had 7 months of worsening and unbearable pain in his right shoulder and arm.  Initially the pain radiated down to his fingers.  He has been in pain management and the distal arm symptoms have resolved but he retains severe pain in the shoulder and has weakness of his shoulder abductors and biceps on the dominant side.  He has failed conservative management with pain management, medications and physical therapy.  He has a CT scan which shows advanced disc degeneration with a large osteophytes at C5-6 extending all the way out the right foramen and behind the vertebral body of C6.  He has elected anterior decompression and fusion    The patient was seen in the preoperative area. The risks, benefits, complications, treatment options, non-operative alternatives, expected recovery and outcomes were discussed with the patient. The possibilities of reaction to medication, pulmonary aspiration, injury to surrounding structures, bleeding, recurrent infection, the need for  additional procedures, failure to diagnose a condition, and creating a complication requiring transfusion or operation were discussed with the patient. The patient concurred with the proposed plan, giving informed consent.  The site of surgery was properly noted/marked if necessary per policy. The patient has been actively warmed in preoperative area. Preoperative antibiotics  Venous thrombosis prophylaxis     Procedure Details: The patient was brought awake to the operating room and general endotracheal anesthesia induced.  IV antibiotics were provided.  Decadron was provided.  Sequential compression boots were applied.  He was positioned supine on a flat Vidal table with a bolster behind the shoulders, chin strap traction of 10 pounds and the shoulders taped towards the feet.  The anterior neck was prepped and draped sterilely.  An x-ray was obtained with an x-ray overlying the skin.  We identified the CT 5 6 disc level.  A transverse incision was made at the medial border of the left sternocleidomastoid incision carried through subcutaneous tissue and hemostasis achieved with cautery.  Cautery divided the platysma.  Blunt dissection was carried out medial to the sternocleidomastoid, lateral to an external jugular vein, distal to the Raquel hyoid muscle, and medial to the carotid sheath.  We approached the anterior spine where there was a large osteophyte at C5-6.  Retraction was unremarkable and did not require a great deal of tension.  Longus coli muscles were coagulated on their medial edges and retracted laterally.  A needle was placed percutaneously into the disc space and an x-ray obtained documenting this to be C5-6.  Self-retaining retractors were placed.  The anterior ligament and annulus were coagulated and then sharply excised.  There was profoundly degenerative disc material in the disc space and it was collapsed.  We cleared the disc space centrally of the disc and cartilage remnants with curettes and  pituitaries.  We mobilized the space much wider with a Arriaza elevator.  We then removed all of the remaining cartilage back to the posterior endplate margins and out to the uncus on both sides.  A lamina  was placed and we then were able to access all the disc material and spondylotic material back to the posterior ligament.  There was no access to the ligament but barely, due to the osteophytes from both vertebra.  We used a 3 mm bur then to remove the posterior rim of osteophytes, starting at the far left side moving towards the middle and to right central edge.  This fully exposed posterior longitudinal ligament.  We then dissected through the ligament using tiny angled curettes and lysed it entering the epidural space.  We moved a lamina  to the left side opening the right side wide.  We used a rongeur to remove a large osteophyte extending up from the far right uncovertebral joint and preserve that bone along with the other anterior osteophytes for subsequent interbody grafting.  We then used the bur to remove the mass of bone extending into the uncovertebral joint and down in towards the foramen.  We then removed the nearest rim of bone from 5 and 6 in the foramen.  We exposed a very large osteophyte extending deep into the foramen and the canal behind C6.  The bur was then used to remove all of the bone anterior to that spur thin it out until it was able to be cracked away from its origin and lifted out.  We removed a considerable amount of bone from 5 and 6 with a bur in order to accomplish full decompression.  Then finally the tiny curettes and a 1 mm Kerrison were used to remove all of the remaining bone and ligament to fully expose the dura and the foramen and nerve root.  Minimal bleeding occurred and was controlled quickly with Gelfoam and a romulo.  No injury to dura occurred no leakage of spinal fluid.  We irrigated the wound extensively with antibiotic solution.  We used the bur to make  flat smooth slightly decorticated parallel surfaces of the endplates.  We used trial sizers and chose an 8 mm's sized cage as ideal.  The peek cage was packed with morselized autogenous bone graft previously harvested.  The cage was tamped in and countersunk and it was rigid in the space.  A 23 mm Atlantis Elite plate was selected and secured with 2 screws into C4-5 2 screws into C6.  They were rigid.  The locking mechanisms were closed.  AP and lateral x-rays were obtained.  The wound was irrigated with antibiotic solution.  A DANIELLA drain was brought in through a separate stab and sutured at skin level.  Gelfoam soaked in Depo-Medrol was laid over the plate.  Vancomycin powder was placed into the deep and superficial spaces.  There was no ongoing bleeding.  Subcu tissue and platysma were closed with 2-0 Vicryl and the skin closed with running 4-0 subcuticular Vicryl.  Sterile dressings were applied and a collar was applied.  Patient was awakened and extubated.  Complications:  None; patient tolerated the procedure well.    Disposition: PACU - hemodynamically stable.  Condition: stable         Additional Details:     Attending Attestation: I was present and scrubbed for the entire procedure.    Kofi Garrison  Phone Number: [phone]

## 2024-04-19 NOTE — ANESTHESIA POSTPROCEDURE EVALUATION
Patient: Doug Encinas    Procedure Summary       Date: 04/19/24 Room / Location: [facility] 07 / [facility]    Anesthesia Start: 0728 Anesthesia Stop:     Procedure: Anterior Cervical Diskectomy and Fusion Cervical 5-6, Cage, Plate (Spine Cervical) Diagnosis:       Spinal stenosis in cervical region      Brachial neuritis      (Cervical spinal stenosis with radiculopathy cervical 5-6)    Surgeons: Kofi Garrison MD Responsible Provider: Hiren Mccormack DO    Anesthesia Type: general ASA Status: 3            Anesthesia Type: general    Vitals BP (!) 128/91   Pulse 80   Temp 35.7 °C (96.3 °F) (Temporal)   Resp 18   SpO2 99%       BP     Temp     Pulse     Resp     SpO2         Anesthesia Post Evaluation    Patient location during evaluation: bedside  Patient participation: complete - patient participated  Level of consciousness: awake  Pain score: 2  Pain management: adequate  Airway patency: patent  Two or more strategies used to mitigate risk of obstructive sleep apnea  Cardiovascular status: acceptable  Respiratory status: acceptable  Hydration status: acceptable  Postoperative Nausea and Vomiting: none        There were no known notable events for this encounter.

## 2024-04-19 NOTE — NURSING NOTE
Patient would like to try norco instead of using PCA pump.  I moved PCA button out of reach and we will try a dose of norco to see if it works for him

## 2024-04-19 NOTE — CARE PLAN
Problem: Pain - Adult  Goal: Verbalizes/displays adequate comfort level or baseline comfort level  Outcome: Progressing     Problem: Safety - Adult  Goal: Free from fall injury  Outcome: Progressing     Problem: Discharge Planning  Goal: Discharge to home or other facility with appropriate resources  Outcome: Progressing     Problem: Chronic Conditions and Co-morbidities  Goal: Patient's chronic conditions and co-morbidity symptoms are monitored and maintained or improved  Outcome: Progressing     Problem: Pain  Goal: Takes deep breaths with improved pain control throughout the shift  Outcome: Progressing  Goal: Turns in bed with improved pain control throughout the shift  Outcome: Progressing  Goal: Walks with improved pain control throughout the shift  Outcome: Progressing  Goal: Performs ADL's with improved pain control throughout shift  Outcome: Progressing  Goal: Participates in PT with improved pain control throughout the shift  Outcome: Progressing  Goal: Free from opioid side effects throughout the shift  Outcome: Progressing  Goal: Free from acute confusion related to pain meds throughout the shift  Outcome: Progressing     Problem: Diabetes  Goal: Achieve decreasing blood glucose levels by end of shift  Outcome: Progressing  Goal: Increase stability of blood glucose readings by end of shift  Outcome: Progressing  Goal: Decrease in ketones present in urine by end of shift  Outcome: Progressing  Goal: Maintain electrolyte levels within acceptable range throughout shift  Outcome: Progressing  Goal: Maintain glucose levels >70mg/dl to <250mg/dl throughout shift  Outcome: Progressing  Goal: No changes in neurological exam by end of shift  Outcome: Progressing  Goal: Learn about and adhere to nutrition recommendations by end of shift  Outcome: Progressing  Goal: Vital signs within normal range for age by end of shift  Outcome: Progressing  Goal: Increase self care and/or family involovement by end of  shift  Outcome: Progressing  Goal: Receive DSME education by end of shift  Outcome: Progressing   The patient's goals for the shift include      The clinical goals for the shift include pain management

## 2024-04-19 NOTE — ANESTHESIA PROCEDURE NOTES
Airway  Date/Time: 4/19/2024 7:43 AM  Urgency: elective    Airway not difficult    Staffing  Performed: GLADYS   Authorized by: Hiren Mccormack DO    Performed by: GLADYS Sarkar  Patient location during procedure: OR    Indications and Patient Condition  Indications for airway management: anesthesia and airway protection  Spontaneous Ventilation: absent  Sedation level: deep  Preoxygenated: yes  Patient position: sniffing  Mask difficulty assessment: 1 - vent by mask  Planned trial extubation    Final Airway Details  Final airway type: endotracheal airway      Successful airway: ETT  Cuffed: yes   Successful intubation technique: video laryngoscopy  Facilitating devices/methods: intubating stylet  Blade: Keya  Blade size: #3  ETT size (mm): 7.5  Cormack-Lehane Classification: grade I - full view of glottis  Placement verified by: chest auscultation, capnometry and palpation of cuff   Measured from: lips  ETT to lips (cm): 22  Number of attempts at approach: 2    Additional Comments  First ETT, cuff not holding air on placement. Removed and patient ventilated until second ETT was placed. Grade 1.

## 2024-04-19 NOTE — CARE PLAN
Problem: Balance  Goal: dynamic  Description: No LOB with dynamic activities in standing/sitting.  Outcome: Progressing     Problem: Mobility  Goal: LTG - Patient will navigate 4-6 steps with rails/device  Outcome: Progressing  Goal: bed mobility  Description: Pt will perform sup to/from sit transfer with mod indep while maintaining spinal precautions  Outcome: Progressing  Goal: ambulation  Description: Pt will amb > 200  ft with LRAD and mod independence   Outcome: Progressing     Problem: Safety  Goal: precautions  Description: Pt will maintain spinal precautions with all mobility.    Outcome: Progressing

## 2024-04-20 ENCOUNTER — PHARMACY VISIT (OUTPATIENT)
Dept: PHARMACY | Facility: CLINIC | Age: 64
End: 2024-04-20

## 2024-04-20 VITALS
BODY MASS INDEX: 29.97 KG/M2 | HEART RATE: 78 BPM | OXYGEN SATURATION: 95 % | DIASTOLIC BLOOD PRESSURE: 85 MMHG | RESPIRATION RATE: 16 BRPM | HEIGHT: 65 IN | SYSTOLIC BLOOD PRESSURE: 148 MMHG | WEIGHT: 179.9 LBS | TEMPERATURE: 97.2 F

## 2024-04-20 PROCEDURE — 97535 SELF CARE MNGMENT TRAINING: CPT | Mod: GO,CO

## 2024-04-20 PROCEDURE — 2500000006 HC RX 250 W HCPCS SELF ADMINISTERED DRUGS (ALT 637 FOR ALL PAYERS): Performed by: ORTHOPAEDIC SURGERY

## 2024-04-20 PROCEDURE — 2500000004 HC RX 250 GENERAL PHARMACY W/ HCPCS (ALT 636 FOR OP/ED): Performed by: ORTHOPAEDIC SURGERY

## 2024-04-20 PROCEDURE — RXMED WILLOW AMBULATORY MEDICATION CHARGE

## 2024-04-20 PROCEDURE — 97116 GAIT TRAINING THERAPY: CPT | Mod: GP

## 2024-04-20 PROCEDURE — G0378 HOSPITAL OBSERVATION PER HR: HCPCS

## 2024-04-20 PROCEDURE — 2500000001 HC RX 250 WO HCPCS SELF ADMINISTERED DRUGS (ALT 637 FOR MEDICARE OP): Performed by: ORTHOPAEDIC SURGERY

## 2024-04-20 RX ORDER — HYDROCODONE BITARTRATE AND ACETAMINOPHEN 5; 325 MG/1; MG/1
1-2 TABLET ORAL EVERY 4 HOURS PRN
Qty: 56 TABLET | Refills: 0 | Status: SHIPPED | OUTPATIENT
Start: 2024-04-20 | End: 2024-04-27

## 2024-04-20 RX ADMIN — TAMSULOSIN HYDROCHLORIDE 0.4 MG: 0.4 CAPSULE ORAL at 08:05

## 2024-04-20 RX ADMIN — PANTOPRAZOLE SODIUM 40 MG: 40 TABLET, DELAYED RELEASE ORAL at 08:05

## 2024-04-20 RX ADMIN — HYDROCODONE BITARTRATE AND ACETAMINOPHEN 1 TABLET: 5; 325 TABLET ORAL at 06:25

## 2024-04-20 RX ADMIN — GABAPENTIN 400 MG: 400 CAPSULE ORAL at 08:05

## 2024-04-20 RX ADMIN — DOCUSATE SODIUM 100 MG: 100 CAPSULE, LIQUID FILLED ORAL at 08:05

## 2024-04-20 RX ADMIN — LISINOPRIL 20 MG: 20 TABLET ORAL at 08:05

## 2024-04-20 RX ADMIN — CEFAZOLIN SODIUM 2 G: 2 INJECTION, SOLUTION INTRAVENOUS at 02:07

## 2024-04-20 RX ADMIN — POLYETHYLENE GLYCOL 3350 17 G: 17 POWDER, FOR SOLUTION ORAL at 08:06

## 2024-04-20 ASSESSMENT — COGNITIVE AND FUNCTIONAL STATUS - GENERAL
MOVING TO AND FROM BED TO CHAIR: A LITTLE
MOVING FROM LYING ON BACK TO SITTING ON SIDE OF FLAT BED WITH BEDRAILS: A LITTLE
DAILY ACTIVITIY SCORE: 19
STANDING UP FROM CHAIR USING ARMS: A LITTLE
DRESSING REGULAR UPPER BODY CLOTHING: A LITTLE
DRESSING REGULAR UPPER BODY CLOTHING: A LITTLE
DAILY ACTIVITIY SCORE: 20
WALKING IN HOSPITAL ROOM: A LITTLE
MOVING FROM LYING ON BACK TO SITTING ON SIDE OF FLAT BED WITH BEDRAILS: A LITTLE
MOBILITY SCORE: 18
DAILY ACTIVITIY SCORE: 19
TOILETING: A LITTLE
MOBILITY SCORE: 20
CLIMB 3 TO 5 STEPS WITH RAILING: A LITTLE
MOVING TO AND FROM BED TO CHAIR: A LITTLE
DRESSING REGULAR UPPER BODY CLOTHING: A LITTLE
TURNING FROM BACK TO SIDE WHILE IN FLAT BAD: A LITTLE
WALKING IN HOSPITAL ROOM: A LITTLE
HELP NEEDED FOR BATHING: A LITTLE
CLIMB 3 TO 5 STEPS WITH RAILING: A LITTLE
WALKING IN HOSPITAL ROOM: A LITTLE
PERSONAL GROOMING: A LITTLE
TURNING FROM BACK TO SIDE WHILE IN FLAT BAD: A LITTLE
TOILETING: A LITTLE
DRESSING REGULAR LOWER BODY CLOTHING: A LITTLE
CLIMB 3 TO 5 STEPS WITH RAILING: A LITTLE
STANDING UP FROM CHAIR USING ARMS: A LITTLE
DRESSING REGULAR LOWER BODY CLOTHING: A LITTLE
PERSONAL GROOMING: A LITTLE
HELP NEEDED FOR BATHING: A LITTLE
DRESSING REGULAR LOWER BODY CLOTHING: A LITTLE
TURNING FROM BACK TO SIDE WHILE IN FLAT BAD: A LITTLE
MOBILITY SCORE: 18
MOVING FROM LYING ON BACK TO SITTING ON SIDE OF FLAT BED WITH BEDRAILS: A LITTLE
TOILETING: A LITTLE
HELP NEEDED FOR BATHING: A LITTLE

## 2024-04-20 ASSESSMENT — PAIN SCALES - GENERAL
PAINLEVEL_OUTOF10: 0 - NO PAIN
PAINLEVEL_OUTOF10: 2
PAINLEVEL_OUTOF10: 0 - NO PAIN
PAINLEVEL_OUTOF10: 3
PAINLEVEL_OUTOF10: 2

## 2024-04-20 ASSESSMENT — PAIN - FUNCTIONAL ASSESSMENT
PAIN_FUNCTIONAL_ASSESSMENT: UNABLE TO SELF-REPORT
PAIN_FUNCTIONAL_ASSESSMENT: 0-10

## 2024-04-20 ASSESSMENT — PAIN DESCRIPTION - LOCATION: LOCATION: BACK

## 2024-04-20 ASSESSMENT — ACTIVITIES OF DAILY LIVING (ADL): EFFECT OF PAIN ON DAILY ACTIVITIES: MILD

## 2024-04-20 ASSESSMENT — PAIN DESCRIPTION - ORIENTATION: ORIENTATION: POSTERIOR

## 2024-04-20 NOTE — PROGRESS NOTES
Postop day 1 status post ACDF C5-6.  Patient seen sitting in chair in no acute distress with cervical collar in place.  Has no complaints of chest pain, shortness of breath or nausea or vomiting.  Unrelenting pain in the right shoulder and trap that was present prior to surgery has dissipated.  Right bicep and deltoid remain slightly weak and at 4/5.  Remainder of strength testing normal.  Voiding without difficulty.  Pain was controlled overnight with PCA morphine and hydrocodone.  Dressing and drain site are clean and dry without signs of drainage.  Drain collected approximately 55 cc over 24-hour period.  Bandage was removed from drain site and drain discontinued.  Clean dry dressing was placed over the drain site.    Plan is for mobilization with physical therapy and Occupational Therapy and discharge to home this morning.  Option for hydrocodone was sent to the [facility] [place]note 2[facility] pharmacy.  He will follow-up in the office in 4 weeks and wear cervical collar 24/7.

## 2024-04-20 NOTE — PROGRESS NOTES
Occupational Therapy    OT Treatment    Patient Name: Doug Encinas  MRN: [id number]  Today's Date: 4/20/2024  Time Calculation  Start Time: 0717  Stop Time: 0755  Time Calculation (min): 38 min         Assessment:  OT Assessment: Pt performing at supervision levels with transfers/mobility and ADL, presents with fair balance, fair activity tolerance, no concerns with self care once home.  Evaluation/Treatment Tolerance: Patient tolerated treatment well  End of Session Patient Position: Up in chair, Alarm on (All needs in reach.)  OT Assessment Results: Decreased ADL status  Evaluation/Treatment Tolerance: Patient tolerated treatment well  Plan:  Treatment Interventions: ADL retraining, Functional transfer training  OT Frequency: 4 times per week  OT Discharge Recommendations: Low intensity level of continued care  OT Recommended Transfer Status: Stand by assist  OT - OK to Discharge: Yes  Treatment Interventions: ADL retraining, Functional transfer training    Subjective   Previous Visit Info:  OT Last Visit  OT Received On: 04/20/24  General:  General  Reason for Referral: impaired ADLs s/p surgery  Referred By: Dr Garrison  Past Medical History Relevant to Rehab: BPH, DM, GERD, HLD, HTN  Prior to Session Communication: Bedside nurse  Patient Position Received: Bed, 3 rail up, Alarm on  General Comment: Agreeable to treatment  Precautions:  Hearing/Visual Limitations: +corrective lenses  Medical Precautions: Fall precautions  Post-Surgical Precautions: Spinal precautions  Precautions Comment: Aspen collar; +j tube  Vital Signs:  Vital Signs  Heart Rate: 80  SpO2: 96 %  BP: 148/85  Pain:  Pain Assessment  Pain Assessment: 0-10  Pain Score: 2  Pain Type: Surgical pain  Pain Location: Neck  Effect of Pain on Daily Activities: mild  Pain Interventions: Repositioned (Pt medicated prior to session)    Objective    Cognition:  Cognition  Overall Cognitive Status: Within Functional Limits  Orientation Level: Oriented  X4  Coordination:     Activities of Daily Living: Grooming  Grooming Level of Assistance: Close supervision  Grooming Where Assessed: Standing sinkside  Grooming Comments: washing face with washrag, brushing teeth, therapist instructs in adaptive stratagies to ease task, keep cervical collar clean.    UE Dressing  UE Dressing Comments: Pt waiting on clothes to arrive, therapist instructs in clothing modifications, adaptive stratagies with zipping, buttoning to adhere to cervical precautions    LE Dressing  Pants Level of Assistance: Close supervision  LE Dressing Where Assessed: Chair  LE Dressing Comments: pt using figure 4 technique    Toileting  Toileting Level of Assistance: Close supervision  Where Assessed: Toilet  Toileting Comments: standing for urination  Functional Standing Tolerance:  Time: 5 minutes  Activity: self care  Functional Standing Tolerance Comments: fair balance  Bed Mobility/Transfers: Bed Mobility 1  Bed Mobility 1: Supine to sitting  Level of Assistance 1: Close supervision  Bed Mobility Comments 1: bed flat    Transfer 1  Transfer From 1: Bed to  Transfer to 1: Chair with arms  Technique 1: To right  Transfer Level of Assistance 1: Contact guard  Trials/Comments 1: cues for safe transfer techniques    Toilet Transfers  Toilet Transfers Comments: pt standing without use of saman bars  Shower Transfers  Shower Transfers Comments: Pt stating Dr Graham stating to wear collar in shower    Car Transfers  Car Transfers Comments: Therapist instructs in correct form, hand out provided      Functional Mobility:  Functional Mobility 1  Surface 1: Level tile  Device 1: No device  Assistance 1: Contact guard  Quality of Functional Mobility 1:  (reciprocal gait, steady pace)  Comments 1: short household distance    Outcome Measures:[facility] Daily Activity  Putting on and taking off regular lower body clothing: A little  Bathing (including washing, rinsing, drying): A little  Putting on and taking off regular  upper body clothing: A little  Toileting, which includes using toilet, bedpan or urinal: A little  Taking care of personal grooming such as brushing teeth: None  Eating Meals: None  Daily Activity - Total Score: 20        Education Documentation  Handouts, taught by MARY Underwood at 4/20/2024  8:16 AM.  Learner: Patient  Readiness: Acceptance  Method: Explanation, Demonstration, Handout  Response: Verbalizes Understanding, Demonstrated Understanding    Body Mechanics, taught by MARY Underwood at 4/20/2024  8:16 AM.  Learner: Patient  Readiness: Acceptance  Method: Explanation, Demonstration, Handout  Response: Verbalizes Understanding, Demonstrated Understanding    Precautions, taught by MARY Underwood at 4/20/2024  8:16 AM.  Learner: Patient  Readiness: Acceptance  Method: Explanation, Demonstration, Handout  Response: Verbalizes Understanding, Demonstrated Understanding    ADL Training, taught by MARY Underwood at 4/20/2024  8:16 AM.  Learner: Patient  Readiness: Acceptance  Method: Explanation, Demonstration, Handout  Response: Verbalizes Understanding, Demonstrated Understanding    Education Comments  No comments found.    IP EDUCATION:  Education  Individual(s) Educated: Patient  Education Provided: Fall precautons, Other (Transfer techniques, adaptive stratagies, environmental adaptations, compensatory techiques.)  Patient Response to Education: Patient/Caregiver Verbalized Understanding of Information, Patient/Caregiver Performed Return Demonstration of Exercises/Activities    Goals:  Encounter Problems       Encounter Problems (Active)       OT Goals       ADLs (Progressing)       Start:  04/19/24    Expected End:  04/26/24       Pt will complete bathing, dressing, and toileting tasks independently using adaptive aides and with increased time as needed.         Functional transfers (Progressing)       Start:  04/19/24    Expected End:  04/26/24       Pt will complete toilet, bed, and chair  transfers independently from elevated seat heights and using bilateral arm supports.         Activity tolerance (Progressing)       Start:  04/19/24    Expected End:  04/26/24       Pt will tolerate 25 minutes of therapeutic activity in order to increase functional endurance needed for IADLs.         Precautions (Progressing)       Start:  04/19/24    Expected End:  04/26/24       Pt will demonstrate/verbalize understanding of spine precautions r/t IADLs at all times.

## 2024-04-20 NOTE — CARE PLAN
The patient's goals for the shift include  pain management, work with therapy, discharge planning   Problem: Pain - Adult  Goal: Verbalizes/displays adequate comfort level or baseline comfort level  Outcome: Progressing     Problem: Safety - Adult  Goal: Free from fall injury  Outcome: Progressing     Problem: Discharge Planning  Goal: Discharge to home or other facility with appropriate resources  Outcome: Progressing     Problem: Chronic Conditions and Co-morbidities  Goal: Patient's chronic conditions and co-morbidity symptoms are monitored and maintained or improved  Outcome: Progressing     Problem: Pain  Goal: Takes deep breaths with improved pain control throughout the shift  Outcome: Progressing  Goal: Turns in bed with improved pain control throughout the shift  Outcome: Progressing  Goal: Walks with improved pain control throughout the shift  Outcome: Progressing  Goal: Performs ADL's with improved pain control throughout shift  Outcome: Progressing  Goal: Participates in PT with improved pain control throughout the shift  Outcome: Progressing  Goal: Free from opioid side effects throughout the shift  Outcome: Progressing  Goal: Free from acute confusion related to pain meds throughout the shift  Outcome: Progressing     Problem: Diabetes  Goal: Achieve decreasing blood glucose levels by end of shift  Outcome: Progressing  Goal: Increase stability of blood glucose readings by end of shift  Outcome: Progressing  Goal: Decrease in ketones present in urine by end of shift  Outcome: Progressing  Goal: Maintain electrolyte levels within acceptable range throughout shift  Outcome: Progressing  Goal: Maintain glucose levels >70mg/dl to <250mg/dl throughout shift  Outcome: Progressing  Goal: No changes in neurological exam by end of shift  Outcome: Progressing  Goal: Learn about and adhere to nutrition recommendations by end of shift  Outcome: Progressing  Goal: Vital signs within normal range for age by end of  shift  Outcome: Progressing  Goal: Increase self care and/or family involovement by end of shift  Outcome: Progressing  Goal: Receive DSME education by end of shift  Outcome: Progressing       The clinical goals for the shift include pain management

## 2024-04-20 NOTE — CARE PLAN
The patient's goals for the shift include  pain management, encourage mobility, monitor labs and vitals    The clinical goals for the shift include pain management    Over the shift, the patient did not make progress toward the following goals. Barriers to progression include none. Recommendations to address these barriers include none.      04/20/24 at 12:32 AM - JOSE MARIA ZAMAN RN

## 2024-04-20 NOTE — PROGRESS NOTES
Physical Therapy    Physical Therapy Treatment    Patient Name: Doug Encinas  MRN: [id number]  Today's Date: 4/20/2024  Time Calculation  Start Time: 0835  Stop Time: 0855  Time Calculation (min): 20 min       Assessment/Plan   PT Assessment  PT Assessment Results: Orthopedic restrictions, Decreased mobility  Rehab Prognosis: Good  Evaluation/Treatment Tolerance: Patient limited by fatigue  Medical Staff Made Aware: Yes  End of Session Communication: Bedside nurse  End of Session Patient Position: Up in chair, Alarm on    Assessment Comment: Pt tolerated ambulation in [place] without AD and navigated stairs without concerns. Pt is safe to dc home with support and assist of wife, from PT standpoint. Discussed and reviewed all precautions and safety with mobility.       PT Plan  Treatment/Interventions: Transfer training, Gait training, Stair training, Balance training, Endurance training, Therapeutic activity  PT Plan: Skilled PT  PT Frequency: Daily  PT Discharge Recommendations:  (supervision and assist from family)  PT Recommended Transfer Status: Stand by assist  PT - OK to Discharge: Yes      General Visit Information:   PT  Visit  PT Received On: 04/20/24  General  Reason for Referral: recent surgery  Referred By: Dr Garrison  Past Medical History Relevant to Rehab: BPH, DM, GERD, HLD, HTN  Family/Caregiver Present: Yes  Caregiver Feedback: spouse present - supportive  Co-Treatment: OT  Co-Treatment Reason: safety with mobility  Prior to Session Communication: Bedside nurse  Patient Position Received: Up in chair, Alarm on  Preferred Learning Style: verbal, visual  General Comment: Sitting up in chair upon arrival. Agreeable to participate. [place] collar intact. To be d/c'd this date.    Subjective   Precautions:  Precautions  Medical Precautions: Fall precautions  Post-Surgical Precautions: Spinal precautions  Precautions Comment: c-spine precautions, Aspen collar  Vital Signs:  Vital Signs  SpO2: 92 %  Patient  "Position: Sitting    Objective   Pain:  Pain Assessment  Pain Assessment: 0-10  Pain Score: 0 - No pain  Pain Type: Surgical pain  Pain Location: Neck  Cognition:  Cognition  Overall Cognitive Status: Within Functional Limits  Orientation Level: Oriented X4  Attention:  (able to follow commands appropriately)  Postural Control:  Postural Control  Posture Comment: rigid posture due to cervical surgery  Static Sitting Balance  Static Sitting-Level of Assistance: Close supervision  Static Sitting-Comment/Number of Minutes: unsupported on EOB  Dynamic Sitting Balance  Dynamic Sitting-Comments: CGA for safety  Static Standing Balance  Static Standing-Level of Assistance: Contact guard  Static Standing-Comment/Number of Minutes: HHA x 1  Dynamic Standing Balance  Dynamic Standing-Comments: no major LOB with standing marches and ambulation. HHA, slightly unsteady due to \"wooziness\" post surgery/anesthesia  Extremity/Trunk Assessments:    Activity Tolerance:  Activity Tolerance  Endurance: Endurance does not limit participation in activity  Treatments:       Therapeutic Activity  Therapeutic Activity Performed: Yes  Therapeutic Activity 1: Reviewed spinal precautions with mobility. Discussed home set-up and safety upon dc.    Bed Mobility  Bed Mobility: No (Pt up in chair pre/post PT. Pt denied any concerns with bed mobility)  Bed Mobility 1  Bed Mobility 1: Supine to sitting  Level of Assistance 1: Close supervision, Contact guard  Bed Mobility Comments 1: HOB max elevated. cueing to maintain cervical precautions  Bed Mobility 2  Bed Mobility  2: Sitting to supine  Level of Assistance 2: Minimum assistance  Bed Mobility Comments 2: reverse log roll technique    Ambulation/Gait Training  Ambulation/Gait Training Performed: Yes  Ambulation/Gait Training 1  Surface 1: Level tile  Device 1:  (single handrail of wc)  Assistance 1: Close supervision  Quality of Gait 1:  (increased flaquita - cueing to slow down and be aware of " surroundings and spinal precautions.)  Comments/Distance (ft) 1: about 200 ft  Transfers  Transfer: Yes  Transfer 1  Technique 1: Sit to stand, Stand to sit  Transfer Level of Assistance 1: Independent  Trials/Comments 1: x3 trials from chair with arms and wc  Transfers 2  Technique 2: Stand to sit  Transfer Level of Assistance 2: Contact guard, Minimal verbal cues  Trials/Comments 2: cueing for hand placement and positioning    Stairs  Stairs: Yes  Stairs  Rails 1: Bilateral  Assistance 1: Distant supervision  Comment/Number of Steps 1: up/down 4 stairs with B handrail, reciprocal gait pattern and distant supervision. no major LOB         Outcome Measures:  [facility] Basic Mobility  Turning from your back to your side while in a flat bed without using bedrails: A little  Moving from lying on your back to sitting on the side of a flat bed without using bedrails: A little  Moving to and from bed to chair (including a wheelchair): None  Standing up from a chair using your arms (e.g. wheelchair or bedside chair): None  To walk in hospital room: A little  Climbing 3-5 steps with railing: A little  Basic Mobility - Total Score: 20    Education Documentation  Handouts, taught by Mara Tarango PT at 4/20/2024  9:19 AM.  Learner: Family, Patient  Readiness: Acceptance  Method: Explanation  Response: Verbalizes Understanding    Precautions, taught by Mara Tarango PT at 4/20/2024  9:19 AM.  Learner: Family, Patient  Readiness: Acceptance  Method: Explanation  Response: Verbalizes Understanding    Body Mechanics, taught by Mara Tarango PT at 4/20/2024  9:19 AM.  Learner: Family, Patient  Readiness: Acceptance  Method: Explanation  Response: Verbalizes Understanding    Home Exercise Program, taught by Mara Tarango PT at 4/20/2024  9:19 AM.  Learner: Family, Patient  Readiness: Acceptance  Method: Explanation  Response: Verbalizes Understanding    Mobility Training, taught by Mara Tarango PT at 4/20/2024  9:19 AM.  Learner: Family,  Patient  Readiness: Acceptance  Method: Explanation  Response: Verbalizes Understanding    Education Comments  No comments found.        OP EDUCATION:       Encounter Problems       Encounter Problems (Active)       Balance       dynamic (Progressing)       Start:  04/19/24    Expected End:  04/26/24       No LOB with dynamic activities in standing/sitting.            Mobility       LTG - Patient will navigate 4-6 steps with rails/device (Progressing)       Start:  04/19/24    Expected End:  04/26/24            bed mobility (Progressing)       Start:  04/19/24    Expected End:  04/26/24       Pt will perform sup to/from sit transfer with mod indep while maintaining spinal precautions         ambulation (Progressing)       Start:  04/19/24    Expected End:  04/26/24       Pt will amb > 200  ft with LRAD and mod independence             Pain - Adult          Safety       precautions (Progressing)       Start:  04/19/24    Expected End:  04/26/24       Pt will maintain spinal precautions with all mobility.

## 2024-04-20 NOTE — DISCHARGE SUMMARY
Discharge Diagnosis  Cervical spinal stenosis    Issues Requiring Follow-Up  Wound drainage    Test Results Pending At Discharge  Pending Labs       No current pending labs.            Hospital Course   Patient was admitted to the hospital on 4/19/2024 for ACDF C5-6.  Once readied the patient was taken to the operating room and placed under general anesthesia and the above-stated procedure was performed without difficulty.  Upon completion the patient was placed in a cervical collar awakened and taken to PACU for recovery from anesthesia.  Once recovered he was transferred to regular nursing floor.  Pain was initially controlled with PCA morphine and hydrocodone.  They were no complications with his overnight stay vital signs remained normal throughout.  Patient mobilized well with physical therapy and Occupational Therapy.  He was discharged home on postop day 1 with a prescription for hydrocodone.  He will follow-up in the office in 4 weeks.    Pertinent Physical Exam At Time of Discharge  Physical Exam    Home Medications     Medication List      START taking these medications     HYDROcodone-acetaminophen 5-325 mg tablet; Commonly known as: Norco;   Take 1-2 tablets by mouth every 4 hours if needed for severe pain (7 - 10)   for up to 7 days. May take 1-2 tabs  every 4-6 hours PRN     CHANGE how you take these medications     Mounjaro 5 mg/0.5 mL pen injector; Generic drug: tirzepatide; Inject 5   mg under the skin 1 (one) time per week.; What changed: additional   instructions     CONTINUE taking these medications     atorvastatin 80 mg tablet; Commonly known as: Lipitor; TAKE 1 TABLET   DAILY   gabapentin 400 mg capsule; Commonly known as: Neurontin   lisinopril 20 mg tablet; TAKE 1 TABLET BY MOUTH EVERY DAY   metFORMIN  mg 24 hr tablet; Commonly known as: Glucophage-XR; TAKE   1 TABLET TWICE A DAY   omeprazole 20 mg DR capsule; Commonly known as: PriLOSEC; TAKE 1 CAPSULE   DAILY   tamsulosin 0.4 mg 24  hr capsule; Commonly known as: Flomax; TAKE 1   CAPSULE DAILY     STOP taking these medications     chlorhexidine 0.12 % solution; Commonly known as: Peridex   meloxicam 15 mg tablet; Commonly known as: Mobic       Outpatient Follow-Up  Future Appointments   Date Time Provider Department Center   6/4/2024  9:45 AM Aba Horne MD [id number]       Fabiano Narayan PA-C

## 2024-05-02 ENCOUNTER — TELEPHONE (OUTPATIENT)
Dept: PRIMARY CARE | Facility: CLINIC | Age: 64
End: 2024-05-02
Payer: COMMERCIAL

## 2024-05-02 NOTE — TELEPHONE ENCOUNTER
PT STATES THEY WILL NOT REFILL HIS MOUNJARO , NOT SURE WHY, ALL THEY TOLD PT WAS  NEEDS TO CALL.  [facility] .   CAN YOU HELP WITH THIS

## 2024-05-03 DIAGNOSIS — E11.9 TYPE 2 DIABETES MELLITUS WITHOUT COMPLICATION, WITHOUT LONG-TERM CURRENT USE OF INSULIN (MULTI): ICD-10-CM

## 2024-05-03 RX ORDER — TIRZEPATIDE 5 MG/.5ML
5 INJECTION, SOLUTION SUBCUTANEOUS
Qty: 6 ML | Refills: 3 | Status: SHIPPED | OUTPATIENT
Start: 2024-05-05 | End: 2025-04-30

## 2024-05-08 PROCEDURE — RXMED WILLOW AMBULATORY MEDICATION CHARGE

## 2024-05-09 ENCOUNTER — PHARMACY VISIT (OUTPATIENT)
Dept: PHARMACY | Facility: CLINIC | Age: 64
End: 2024-05-09
Payer: MEDICARE

## 2024-05-30 PROCEDURE — RXMED WILLOW AMBULATORY MEDICATION CHARGE

## 2024-05-31 ENCOUNTER — PHARMACY VISIT (OUTPATIENT)
Dept: PHARMACY | Facility: CLINIC | Age: 64
End: 2024-05-31
Payer: MEDICARE

## 2024-06-04 ENCOUNTER — OFFICE VISIT (OUTPATIENT)
Dept: PRIMARY CARE | Facility: CLINIC | Age: 64
End: 2024-06-04
Payer: COMMERCIAL

## 2024-06-04 VITALS
HEIGHT: 65 IN | SYSTOLIC BLOOD PRESSURE: 126 MMHG | HEART RATE: 77 BPM | WEIGHT: 165 LBS | DIASTOLIC BLOOD PRESSURE: 80 MMHG | BODY MASS INDEX: 27.49 KG/M2 | OXYGEN SATURATION: 98 %

## 2024-06-04 DIAGNOSIS — E11.9 TYPE 2 DIABETES MELLITUS WITHOUT COMPLICATION, UNSPECIFIED WHETHER LONG TERM INSULIN USE (MULTI): Primary | ICD-10-CM

## 2024-06-04 DIAGNOSIS — I10 ESSENTIAL HYPERTENSION, BENIGN: ICD-10-CM

## 2024-06-04 DIAGNOSIS — M48.02 CERVICAL SPINAL STENOSIS: ICD-10-CM

## 2024-06-04 LAB — POC HEMOGLOBIN A1C: 5.7 % (ref 4.2–6.5)

## 2024-06-04 PROCEDURE — 83036 HEMOGLOBIN GLYCOSYLATED A1C: CPT | Performed by: FAMILY MEDICINE

## 2024-06-04 PROCEDURE — 4010F ACE/ARB THERAPY RXD/TAKEN: CPT | Performed by: FAMILY MEDICINE

## 2024-06-04 PROCEDURE — 1036F TOBACCO NON-USER: CPT | Performed by: FAMILY MEDICINE

## 2024-06-04 PROCEDURE — 3079F DIAST BP 80-89 MM HG: CPT | Performed by: FAMILY MEDICINE

## 2024-06-04 PROCEDURE — 3044F HG A1C LEVEL LT 7.0%: CPT | Performed by: FAMILY MEDICINE

## 2024-06-04 PROCEDURE — 99214 OFFICE O/P EST MOD 30 MIN: CPT | Performed by: FAMILY MEDICINE

## 2024-06-04 PROCEDURE — 3074F SYST BP LT 130 MM HG: CPT | Performed by: FAMILY MEDICINE

## 2024-06-04 ASSESSMENT — PAIN SCALES - GENERAL: PAINLEVEL: 0-NO PAIN

## 2024-06-04 ASSESSMENT — PATIENT HEALTH QUESTIONNAIRE - PHQ9
2. FEELING DOWN, DEPRESSED OR HOPELESS: NOT AT ALL
1. LITTLE INTEREST OR PLEASURE IN DOING THINGS: NOT AT ALL
SUM OF ALL RESPONSES TO PHQ9 QUESTIONS 1 AND 2: 0

## 2024-06-04 NOTE — PROGRESS NOTES
"Subjective   Patient ID: Doug Encinas is a 64 y.o. male who presents for Diabetes (Eye exam-  4/2024//No labs done ).    HPI here for diabetic check.  Patient is currently on metformin  mg twice daily.  He is also on Mounjaro 5 mg weekly.  He continues to lose weight.  He is lost 15 pounds in the past 2 months.  Patient is status post C-spine surgery.  He is still in a cervical collar for the next 2 weeks.  After that he will be activity ad brady.  Patient has hypertension.  He is stable on lisinopril 20 mg every day.  Review of Systems  Constitutional: Patient is positive for weight loss.  Patient is negative for fatigue, fever.  HEENT: Patient is negative for change in vision, hearing, swallow.  Cardio: Patient is negative for chest pain, lower extremity edema.  Pulmonary: Patient is negative for cough, shortness of breath.  Objective   /80 (BP Location: Left arm, Patient Position: Sitting, BP Cuff Size: Adult)   Pulse 77   Ht 1.651 m (5' 5\")   Wt 74.8 kg (165 lb)   SpO2 98%   BMI 27.46 kg/m²     Physical Exam  General: Awake and alert no apparent distress.  HEENT: Moist oral mucosa no cervical lymphadenopathy.  Cardio: Heart S1-S2 no murmur rub or gallop.  Pulmonary: Lungs clear to auscultation bilaterally.  Assessment/Plan   Problem List Items Addressed This Visit             ICD-10-CM    Type 2 diabetes mellitus without complication (Multi) - Primary stable.  Continue on Mounjaro 5 mg weekly, metformin  mg twice daily.  POCT HgbA1c is 5.7. E11.9    Relevant Orders    POCT glycosylated hemoglobin (Hb A1C) manually resulted    Cervical spinal stenosis improved.  Patient is status post surgery. M48.02     Other Visit Diagnoses         Codes    Essential hypertension, benign    stable.  Continue on lisinopril 20 mg daily.  Patient will follow-up in about 6 months for physical exam. I10               "

## 2024-06-27 PROCEDURE — RXMED WILLOW AMBULATORY MEDICATION CHARGE

## 2024-06-28 ENCOUNTER — PHARMACY VISIT (OUTPATIENT)
Dept: PHARMACY | Facility: CLINIC | Age: 64
End: 2024-06-28
Payer: MEDICARE

## 2024-07-25 PROCEDURE — RXMED WILLOW AMBULATORY MEDICATION CHARGE

## 2024-07-26 ENCOUNTER — PHARMACY VISIT (OUTPATIENT)
Dept: PHARMACY | Facility: CLINIC | Age: 64
End: 2024-07-26
Payer: MEDICARE

## 2024-08-16 PROCEDURE — RXMED WILLOW AMBULATORY MEDICATION CHARGE

## 2024-08-19 ENCOUNTER — PHARMACY VISIT (OUTPATIENT)
Dept: PHARMACY | Facility: CLINIC | Age: 64
End: 2024-08-19
Payer: MEDICARE

## 2024-08-28 DIAGNOSIS — E78.5 HYPERLIPIDEMIA, UNSPECIFIED HYPERLIPIDEMIA TYPE: ICD-10-CM

## 2024-08-28 DIAGNOSIS — Z79.4 TYPE 2 DIABETES MELLITUS WITHOUT COMPLICATION, WITH LONG-TERM CURRENT USE OF INSULIN (MULTI): ICD-10-CM

## 2024-08-28 DIAGNOSIS — E11.9 TYPE 2 DIABETES MELLITUS WITHOUT COMPLICATION, WITH LONG-TERM CURRENT USE OF INSULIN (MULTI): ICD-10-CM

## 2024-08-28 DIAGNOSIS — K21.9 GASTROESOPHAGEAL REFLUX DISEASE WITHOUT ESOPHAGITIS: ICD-10-CM

## 2024-08-28 RX ORDER — METFORMIN HYDROCHLORIDE 750 MG/1
750 TABLET, EXTENDED RELEASE ORAL 2 TIMES DAILY
Qty: 180 TABLET | Refills: 0 | Status: SHIPPED | OUTPATIENT
Start: 2024-08-28

## 2024-08-28 RX ORDER — OMEPRAZOLE 20 MG/1
20 CAPSULE, DELAYED RELEASE ORAL DAILY
Qty: 90 CAPSULE | Refills: 0 | Status: SHIPPED | OUTPATIENT
Start: 2024-08-28

## 2024-08-28 RX ORDER — ATORVASTATIN CALCIUM 80 MG/1
80 TABLET, FILM COATED ORAL DAILY
Qty: 90 TABLET | Refills: 0 | Status: SHIPPED | OUTPATIENT
Start: 2024-08-28

## 2024-09-04 ENCOUNTER — TELEPHONE (OUTPATIENT)
Dept: PRIMARY CARE | Facility: CLINIC | Age: 64
End: 2024-09-04
Payer: COMMERCIAL

## 2024-09-04 DIAGNOSIS — Z12.5 SCREENING PSA (PROSTATE SPECIFIC ANTIGEN): ICD-10-CM

## 2024-09-04 DIAGNOSIS — Z00.00 WELL ADULT EXAM: ICD-10-CM

## 2024-09-04 DIAGNOSIS — E11.9 TYPE 2 DIABETES MELLITUS WITHOUT COMPLICATION, UNSPECIFIED WHETHER LONG TERM INSULIN USE (MULTI): ICD-10-CM

## 2024-09-04 DIAGNOSIS — I10 ESSENTIAL HYPERTENSION, BENIGN: ICD-10-CM

## 2024-09-04 DIAGNOSIS — E78.00 HYPERCHOLESTEREMIA: ICD-10-CM

## 2024-09-05 PROBLEM — D22.30 MELANOCYTIC NEVI OF UNSPECIFIED PART OF FACE: Status: ACTIVE | Noted: 2017-05-02

## 2024-09-05 PROBLEM — L72.0 EPIDERMAL CYST: Status: ACTIVE | Noted: 2022-12-20

## 2024-09-05 PROBLEM — M19.049 LOCALIZED, PRIMARY OSTEOARTHRITIS OF HAND: Status: ACTIVE | Noted: 2019-10-26

## 2024-09-05 PROBLEM — E78.2 MIXED HYPERLIPIDEMIA: Status: ACTIVE | Noted: 2021-12-07

## 2024-09-05 PROBLEM — R29.2 HYPOREFLEXIA: Status: ACTIVE | Noted: 2020-07-23

## 2024-09-05 PROBLEM — L72.3 SEBACEOUS CYST: Status: ACTIVE | Noted: 2023-02-22

## 2024-09-05 PROBLEM — M19.049 HAND ARTHRITIS: Status: ACTIVE | Noted: 2020-01-20

## 2024-09-05 PROBLEM — R03.0 ELEVATED BLOOD PRESSURE READING: Status: ACTIVE | Noted: 2019-12-05

## 2024-09-13 PROCEDURE — RXMED WILLOW AMBULATORY MEDICATION CHARGE

## 2024-09-14 ENCOUNTER — PHARMACY VISIT (OUTPATIENT)
Dept: PHARMACY | Facility: CLINIC | Age: 64
End: 2024-09-14
Payer: MEDICARE

## 2024-10-11 PROCEDURE — RXMED WILLOW AMBULATORY MEDICATION CHARGE

## 2024-10-14 ENCOUNTER — PHARMACY VISIT (OUTPATIENT)
Dept: PHARMACY | Facility: CLINIC | Age: 64
End: 2024-10-14
Payer: MEDICARE

## 2024-11-04 PROCEDURE — RXMED WILLOW AMBULATORY MEDICATION CHARGE

## 2024-11-05 ENCOUNTER — PHARMACY VISIT (OUTPATIENT)
Dept: PHARMACY | Facility: CLINIC | Age: 64
End: 2024-11-05
Payer: MEDICARE

## 2024-11-19 DIAGNOSIS — E78.5 HYPERLIPIDEMIA, UNSPECIFIED HYPERLIPIDEMIA TYPE: ICD-10-CM

## 2024-11-19 DIAGNOSIS — K21.9 GASTROESOPHAGEAL REFLUX DISEASE WITHOUT ESOPHAGITIS: ICD-10-CM

## 2024-11-19 RX ORDER — OMEPRAZOLE 20 MG/1
20 CAPSULE, DELAYED RELEASE ORAL DAILY
Qty: 90 CAPSULE | Refills: 3 | Status: SHIPPED | OUTPATIENT
Start: 2024-11-19

## 2024-11-19 RX ORDER — ATORVASTATIN CALCIUM 80 MG/1
80 TABLET, FILM COATED ORAL DAILY
Qty: 90 TABLET | Refills: 3 | Status: SHIPPED | OUTPATIENT
Start: 2024-11-19

## 2024-11-22 ENCOUNTER — OFFICE VISIT (OUTPATIENT)
Dept: PRIMARY CARE | Facility: CLINIC | Age: 64
End: 2024-11-22
Payer: COMMERCIAL

## 2024-11-22 VITALS
HEART RATE: 75 BPM | OXYGEN SATURATION: 95 % | DIASTOLIC BLOOD PRESSURE: 70 MMHG | SYSTOLIC BLOOD PRESSURE: 118 MMHG | HEIGHT: 65 IN | BODY MASS INDEX: 28.99 KG/M2 | WEIGHT: 174 LBS | TEMPERATURE: 96.7 F

## 2024-11-22 DIAGNOSIS — M79.641 RIGHT HAND PAIN: Primary | ICD-10-CM

## 2024-11-22 PROCEDURE — 4010F ACE/ARB THERAPY RXD/TAKEN: CPT | Performed by: FAMILY MEDICINE

## 2024-11-22 PROCEDURE — 3008F BODY MASS INDEX DOCD: CPT | Performed by: FAMILY MEDICINE

## 2024-11-22 PROCEDURE — 3074F SYST BP LT 130 MM HG: CPT | Performed by: FAMILY MEDICINE

## 2024-11-22 PROCEDURE — 1036F TOBACCO NON-USER: CPT | Performed by: FAMILY MEDICINE

## 2024-11-22 PROCEDURE — 3078F DIAST BP <80 MM HG: CPT | Performed by: FAMILY MEDICINE

## 2024-11-22 PROCEDURE — 99213 OFFICE O/P EST LOW 20 MIN: CPT | Performed by: FAMILY MEDICINE

## 2024-11-22 PROCEDURE — 3044F HG A1C LEVEL LT 7.0%: CPT | Performed by: FAMILY MEDICINE

## 2024-11-22 RX ORDER — IBUPROFEN 600 MG/1
600 TABLET ORAL 4 TIMES DAILY PRN
Qty: 90 TABLET | Refills: 0 | Status: SHIPPED | OUTPATIENT
Start: 2024-11-22 | End: 2025-11-22

## 2024-11-22 ASSESSMENT — PAIN SCALES - GENERAL: PAINLEVEL_OUTOF10: 3

## 2024-11-22 NOTE — PROGRESS NOTES
"Subjective   Patient ID: Doug Encinas is a 64 y.o. male who presents for Hand Pain (Right hand pain x 2 weeks.   No injury.   He has noticed swelling intermittently.   Using OTC Advil).    HPI here with a 2-week history of pain and swelling to the middle to knuckles of the right hand.  Patient works on a computer most of the day.  He has been on vacation for the last week or so and the pain is still significant and not resolving.  He has used OTC ibuprofen 2 at a time couple times a day without relief.  Patient is also complaining of pain in his left knee that is intermittent.    Review of Systems  Constitutional: Patient is negative for fever, fatigue, weight change.  HEENT: Patient is negative for change in vision, hearing, swallow.  Cardio: Patient is negative for chest pain, lower extremity edema.  Pulmonary: Patient is negative for cough, shortness of breath.  Musculoskeletal: Patient is positive for pain and swelling in the knuckles of his right hand and left knee pain.  Objective   Temp 35.9 °C (96.7 °F) (Temporal)   Ht 1.651 m (5' 5\")   Wt 78.9 kg (174 lb)   BMI 28.96 kg/m²     Physical Exam  General: Awake and alert no apparent distress.  HEENT: Moist oral mucosa no cervical lymphadenopathy.  Cardio: Heart S1-S2 no murmur rub or gallop.  Pulmonary: Lungs clear to auscultation bilaterally.  Musculoskeletal: Edema to the MCPs of the long finger and ring finger of the right hand.  Assessment/Plan   Problem List Items Addressed This Visit    None  Visit Diagnoses         Codes    Right hand pain    -  Primary needs better control.  Will begin ibuprofen 6 mg 3 times daily.  Patient is no better in 2 weeks he will follow-up here.  Otherwise he will be seen in about a month for CPE.  Will also add rheumatoid panel to his upcoming labs as he has multiple arthritides at this time. M79.641    Relevant Medications    ibuprofen 600 mg tablet    Other Relevant Orders    ERICH with Reflex to CAITLIN    Antistreptolysin O " Titer    C-Reactive Protein    Rheumatoid Factor    Sedimentation Rate    Uric Acid

## 2024-12-02 PROCEDURE — RXMED WILLOW AMBULATORY MEDICATION CHARGE

## 2024-12-03 ENCOUNTER — PHARMACY VISIT (OUTPATIENT)
Dept: PHARMACY | Facility: CLINIC | Age: 64
End: 2024-12-03
Payer: MEDICARE

## 2024-12-07 DIAGNOSIS — E11.9 TYPE 2 DIABETES MELLITUS WITHOUT COMPLICATION, WITH LONG-TERM CURRENT USE OF INSULIN (MULTI): ICD-10-CM

## 2024-12-07 DIAGNOSIS — Z79.4 TYPE 2 DIABETES MELLITUS WITHOUT COMPLICATION, WITH LONG-TERM CURRENT USE OF INSULIN (MULTI): ICD-10-CM

## 2024-12-09 RX ORDER — METFORMIN HYDROCHLORIDE 750 MG/1
750 TABLET, EXTENDED RELEASE ORAL 2 TIMES DAILY
Qty: 180 TABLET | Refills: 0 | Status: SHIPPED | OUTPATIENT
Start: 2024-12-09

## 2024-12-11 ENCOUNTER — HOSPITAL ENCOUNTER (OUTPATIENT)
Dept: RADIOLOGY | Facility: CLINIC | Age: 64
Discharge: HOME | End: 2024-12-11
Payer: COMMERCIAL

## 2024-12-11 DIAGNOSIS — M17.12 UNILATERAL PRIMARY OSTEOARTHRITIS, LEFT KNEE: ICD-10-CM

## 2024-12-11 PROCEDURE — 73562 X-RAY EXAM OF KNEE 3: CPT | Mod: LEFT SIDE | Performed by: RADIOLOGY

## 2024-12-11 PROCEDURE — 73562 X-RAY EXAM OF KNEE 3: CPT | Mod: LT

## 2024-12-14 ENCOUNTER — LAB (OUTPATIENT)
Dept: LAB | Facility: LAB | Age: 64
End: 2024-12-14
Payer: COMMERCIAL

## 2024-12-14 DIAGNOSIS — M79.641 RIGHT HAND PAIN: ICD-10-CM

## 2024-12-14 LAB
CRP SERPL-MCNC: 0.11 MG/DL
ERYTHROCYTE [SEDIMENTATION RATE] IN BLOOD BY WESTERGREN METHOD: 2 MM/H (ref 0–20)
RHEUMATOID FACT SER NEPH-ACNC: <10 IU/ML (ref 0–15)
URATE SERPL-MCNC: 4.4 MG/DL (ref 4–7.5)

## 2024-12-14 PROCEDURE — 86225 DNA ANTIBODY NATIVE: CPT

## 2024-12-14 PROCEDURE — 36415 COLL VENOUS BLD VENIPUNCTURE: CPT

## 2024-12-14 PROCEDURE — 86038 ANTINUCLEAR ANTIBODIES: CPT

## 2024-12-14 PROCEDURE — 86060 ANTISTREPTOLYSIN O TITER: CPT

## 2024-12-14 PROCEDURE — 84550 ASSAY OF BLOOD/URIC ACID: CPT

## 2024-12-14 PROCEDURE — 86140 C-REACTIVE PROTEIN: CPT

## 2024-12-14 PROCEDURE — 86235 NUCLEAR ANTIGEN ANTIBODY: CPT

## 2024-12-14 PROCEDURE — 85652 RBC SED RATE AUTOMATED: CPT

## 2024-12-14 PROCEDURE — 86431 RHEUMATOID FACTOR QUANT: CPT

## 2024-12-15 LAB — ASO AB SERPL-ACNC: 96 IU/ML (ref 0–250)

## 2024-12-16 LAB
ANA PATTERN: ABNORMAL
ANA SER QL HEP2 SUBST: POSITIVE
ANA TITR SER IF: ABNORMAL {TITER}
CENTROMERE B AB SER-ACNC: <0.2 AI
CHROMATIN AB SERPL-ACNC: <0.2 AI
DSDNA AB SER-ACNC: <1 IU/ML
ENA JO1 AB SER QL IA: <0.2 AI
ENA RNP AB SER IA-ACNC: <0.2 AI
ENA SCL70 AB SER QL IA: <0.2 AI
ENA SM AB SER IA-ACNC: <0.2 AI
ENA SM+RNP AB SER QL IA: <0.2 AI
ENA SS-A AB SER IA-ACNC: <0.2 AI
ENA SS-B AB SER IA-ACNC: <0.2 AI
RIBOSOMAL P AB SER-ACNC: <0.2 AI

## 2024-12-19 ENCOUNTER — APPOINTMENT (OUTPATIENT)
Dept: ORTHOPEDIC SURGERY | Facility: CLINIC | Age: 64
End: 2024-12-19
Payer: COMMERCIAL

## 2024-12-19 DIAGNOSIS — M85.60 BONE CYST: Primary | ICD-10-CM

## 2024-12-19 PROCEDURE — 1036F TOBACCO NON-USER: CPT | Performed by: ORTHOPAEDIC SURGERY

## 2024-12-19 PROCEDURE — 4010F ACE/ARB THERAPY RXD/TAKEN: CPT | Performed by: ORTHOPAEDIC SURGERY

## 2024-12-19 PROCEDURE — 99204 OFFICE O/P NEW MOD 45 MIN: CPT | Performed by: ORTHOPAEDIC SURGERY

## 2024-12-19 PROCEDURE — 3044F HG A1C LEVEL LT 7.0%: CPT | Performed by: ORTHOPAEDIC SURGERY

## 2024-12-19 ASSESSMENT — PAIN SCALES - GENERAL: PAINLEVEL_OUTOF10: 6

## 2024-12-19 ASSESSMENT — PAIN - FUNCTIONAL ASSESSMENT: PAIN_FUNCTIONAL_ASSESSMENT: 0-10

## 2024-12-19 NOTE — PROGRESS NOTES
This is a consultation from Dr. Aba Horne MD for   Chief Complaint   Patient presents with    Left Knee - Pain       This is a 64 y.o. male who presents for evaluation of left knee pain.  Patient states he has had issues for many years, he recently had partial knee replacement his left knee about 15 years ago.  It was an uncomplicated surgery.  He does note that he had to have some blood aspirated from the knee a couple of times afterwards but there were no infections or issues with wound healing.  Itself this pain problem that was having before that surgery and was good for many years.  In about the last 5 to 6 years she has had increasing pain in the knee, he is never had any drainage fevers or chills.  Pain is mainly over the lateral and anterior knee.  It is worse when he sits but not as bad when he walks on it.  No numbness or tingling no fevers or chills no shooting pain down the leg.    Physical Exam    There has been no interval change in this patient's past medical, surgical, medications, allergies, family history or social history since the most recent visit to a provider within our department. 14 point review of systems was performed, reviewed, and negative except for pertinent positives documented in the history of present illness.     Constitutional: well developed, well nourished male in no acute distress  Psychiatric: normal mood, appropriate affect  Eyes: sclera anicteric  HENT: normocephalic/atraumatic  CV: regular rate and rhythm   Respiratory: non labored breathing  Integumentary: no rash  Neurological: moves all extremities    Left knee examination: Well-healed surgical incision erythema no drainage, grossly stable to varus and valgus stress range of motion 0 to 120 degrees.  Tender laterally, also tender anteriorly around the tibial tubercle.    Xrays were ordered by me, they were reviewed and independently interpreted by me today, they show a well-fixed lateral unicompartmental knee  arthroplasty, there is no evidence of loosening or fracture.  There was a large area of bony cystic change in the anterior medial tibia.    Lab Results   Component Value Date    CRP 0.11 12/14/2024     Lab Results   Component Value Date    SEDRATE 2 12/14/2024         Procedures      Impression/Plan: This is a 64 y.o. male with a painful lateral unicompartmental knee arthroplasty and large bony cyst.  I discussed the situation in detail with the patient including [facility] clinical and radiographic findings treatment options risks and benefits.  There is no evidence of infection, his most recent CRP and ESR were completely negative that was done this past week.  There are couple of issues for him, I feel based on imaging is likely that this is a benign bony cyst however it is reasonable to get further workup with an MRI.  He noted to me that he actually already has an appointment scheduled with the orthopedic oncology specialist.  It is unlikely that this is malignant however I do not think is unreasonable for him to keep that appointment.  Will get him set up with an MRI before that with contrast in order to further evaluate the extent of the cyst.  Another issue in question is whether this cyst is the actual cause of his pain.  Hopefully the MRI will get us a little bit of improved visualization of the bone around the implant and may shed light on that.  Assuming this is a completely benign process we could consider conversion to a total knee which may require a metaphyseal fixation in order to deal with the bony cyst.  I will see him back once has had the MRI done.    BMI Readings from Last 1 Encounters:   11/22/24 28.96 kg/m²      Lab Results   Component Value Date    CREATININE 0.80 04/15/2024     Tobacco Use: Medium Risk (12/19/2024)    Patient History     Smoking Tobacco Use: Former     Smokeless Tobacco Use: Never     Passive Exposure: Not on file      Computed MELD 3.0 unavailable. One or more values for  this score either were not found within the given timeframe or did not fit some other criterion.  Computed MELD-Na unavailable. One or more values for this score either were not found within the given timeframe or did not fit some other criterion.       Lab Results   Component Value Date    HGBA1C 5.7 06/04/2024     Lab Results   Component Value Date    STAPHMRSASCR No Staphylococcus aureus isolated 04/15/2024

## 2024-12-24 ENCOUNTER — APPOINTMENT (OUTPATIENT)
Dept: PRIMARY CARE | Facility: CLINIC | Age: 64
End: 2024-12-24
Payer: COMMERCIAL

## 2024-12-24 ENCOUNTER — TELEPHONE (OUTPATIENT)
Dept: PRIMARY CARE | Facility: CLINIC | Age: 64
End: 2024-12-24

## 2024-12-24 VITALS
OXYGEN SATURATION: 96 % | TEMPERATURE: 97.2 F | WEIGHT: 164 LBS | DIASTOLIC BLOOD PRESSURE: 80 MMHG | HEIGHT: 65 IN | HEART RATE: 64 BPM | SYSTOLIC BLOOD PRESSURE: 126 MMHG | BODY MASS INDEX: 27.32 KG/M2

## 2024-12-24 DIAGNOSIS — E11.9 TYPE 2 DIABETES MELLITUS WITHOUT COMPLICATION, UNSPECIFIED WHETHER LONG TERM INSULIN USE (MULTI): ICD-10-CM

## 2024-12-24 DIAGNOSIS — N40.0 BENIGN PROSTATIC HYPERPLASIA WITHOUT LOWER URINARY TRACT SYMPTOMS: ICD-10-CM

## 2024-12-24 DIAGNOSIS — Z12.5 SCREENING PSA (PROSTATE SPECIFIC ANTIGEN): ICD-10-CM

## 2024-12-24 DIAGNOSIS — I10 ESSENTIAL HYPERTENSION, BENIGN: Primary | ICD-10-CM

## 2024-12-24 DIAGNOSIS — E78.00 HYPERCHOLESTEREMIA: ICD-10-CM

## 2024-12-24 DIAGNOSIS — I10 HYPERTENSION, UNSPECIFIED TYPE: ICD-10-CM

## 2024-12-24 DIAGNOSIS — I10 ESSENTIAL HYPERTENSION, BENIGN: ICD-10-CM

## 2024-12-24 DIAGNOSIS — G89.29 CHRONIC PAIN OF LEFT KNEE: ICD-10-CM

## 2024-12-24 DIAGNOSIS — Z00.00 WELL ADULT EXAM: ICD-10-CM

## 2024-12-24 DIAGNOSIS — M25.562 CHRONIC PAIN OF LEFT KNEE: ICD-10-CM

## 2024-12-24 PROCEDURE — 3008F BODY MASS INDEX DOCD: CPT | Performed by: FAMILY MEDICINE

## 2024-12-24 PROCEDURE — 3044F HG A1C LEVEL LT 7.0%: CPT | Performed by: FAMILY MEDICINE

## 2024-12-24 PROCEDURE — 99396 PREV VISIT EST AGE 40-64: CPT | Performed by: FAMILY MEDICINE

## 2024-12-24 PROCEDURE — 3079F DIAST BP 80-89 MM HG: CPT | Performed by: FAMILY MEDICINE

## 2024-12-24 PROCEDURE — 1036F TOBACCO NON-USER: CPT | Performed by: FAMILY MEDICINE

## 2024-12-24 PROCEDURE — 3074F SYST BP LT 130 MM HG: CPT | Performed by: FAMILY MEDICINE

## 2024-12-24 PROCEDURE — 4010F ACE/ARB THERAPY RXD/TAKEN: CPT | Performed by: FAMILY MEDICINE

## 2024-12-24 RX ORDER — LISINOPRIL 20 MG/1
20 TABLET ORAL DAILY
Qty: 90 TABLET | Refills: 3 | Status: SHIPPED | OUTPATIENT
Start: 2024-12-24

## 2024-12-24 RX ORDER — TAMSULOSIN HYDROCHLORIDE 0.4 MG/1
0.4 CAPSULE ORAL DAILY
Qty: 90 CAPSULE | Refills: 2 | Status: SHIPPED | OUTPATIENT
Start: 2024-12-24

## 2024-12-24 ASSESSMENT — PATIENT HEALTH QUESTIONNAIRE - PHQ9
2. FEELING DOWN, DEPRESSED OR HOPELESS: NOT AT ALL
1. LITTLE INTEREST OR PLEASURE IN DOING THINGS: NOT AT ALL
1. LITTLE INTEREST OR PLEASURE IN DOING THINGS: NOT AT ALL
2. FEELING DOWN, DEPRESSED OR HOPELESS: NOT AT ALL
SUM OF ALL RESPONSES TO PHQ9 QUESTIONS 1 AND 2: 0
SUM OF ALL RESPONSES TO PHQ9 QUESTIONS 1 AND 2: 0

## 2024-12-24 ASSESSMENT — ENCOUNTER SYMPTOMS
OCCASIONAL FEELINGS OF UNSTEADINESS: 0
DEPRESSION: 0
LOSS OF SENSATION IN FEET: 0

## 2024-12-24 ASSESSMENT — PAIN SCALES - GENERAL: PAINLEVEL_OUTOF10: 6

## 2024-12-24 NOTE — PROGRESS NOTES
Subjective   Patient ID: Doug Encinas is a 64 y.o. male who presents for Annual Exam (EKG  4/2024/Colonoscopy  1/2020/Zoster 12/2022  #2  declines/Flu vaccine   declines today/Tdap   12/2019/Patient had labs done but not CPE labs that were ordered 9/2024).    HPI    Here for physical exam.  Patient currently has exquisite left knee pain.  It hurts for him to bend it.  He will be seeing orthopedics in the near future.  He will be getting an MRI scan done in the near future.  Patient has chronic arthritis in his hands back and neck, and he uses meloxicam daily.  He is positive for ERICH.  Patient has reflux and is stable on omeprazole.  Patient has benign prostatic hypertrophy. He is stable on tamsulosin.  Patient has hyperlipidemia and is stable on atorvastatin 40 milligrams daily.  Patient has diabetes. He has on metformin  milligrams twice a day. Doing well on Mounjaro5 mg weekly. He has not had labs done yet.  Patient reports colonoscopy in 2020 with a 10-year follow-up.  Patient does not desire influenza or coronavirus immunizations although he has yet to coronavirus immunizations.  He had a tetanus shot in 2019.  He has had 1 pneumococcal immunization.  It appears that he is having 1 shingles immunization.  He is not interested in further immunizations at this time.  Patient is a former tobacco user but has quit many years ago.  Patient does not use tobacco.  Review of Systems  Constitutional Symptoms: Patient is positive for concerted weight loss. He is negative for fever, loss of appetite, headaches, fatigue.   Eyes: Pt is negative for loss and blurring of vision, double vision.   Ear, Nose, Mouth, Throat: Pt is negative for hearing loss, tinnitus, nasal congestion, rhinorrhea, nose bleeds, teeth problems, mouth sores, gum disease, dysphagia, sore throat.   Cardiovascular: Pt is negative for chest pain/pressure, palpitations, edema, claudication.   Respiratory: Pt is negative for shortness of breath,  "dyspnea on exertion, pain with breathing, coughing.   Gastrointestinal: Pt is negative for anorexia, indigestion, nausea, vomiting, abdominal pain, change in bowel habits, diarrhea, constipation, hematochezia, melena, blood in stool.   Musculoskeletal: patient is positive for pain with walking in the left lower leg. Pt is negative for joint pain, joint swelling, myalgias, cramps.   Integumentary: Pt is negative for change in mole, skin trouble or rash.   Neurological: Pt is negative for headache, numbness, tingling, weakness, tremors.   Psychiatric: Pt is negative for depression, anxiety.   Endocrine: Pt is negative for weight gain, heat or cold intolerance, polyuria, polydipsia, polyphagia.   Objective   /80 (BP Location: Left arm, Patient Position: Sitting)   Pulse 64   Temp 36.2 °C (97.2 °F) (Temporal)   Ht 1.651 m (5' 5\")   Wt 74.4 kg (164 lb)   SpO2 96%   BMI 27.29 kg/m²     Physical Exam  General Appearance: Vital Signs have been reviewed. Comfortable. Well nourished, and well developed. He is awake, alert, and oriented and appears his stated age. The patient is cooperative with exam.  Head: Hair pattern reveals a normal pattern for patients age and The face shows no abnormalities .  Eyes: PERRLA, EOMI, conjunctiva and sclerae clear. Extraocular muscle exam reveals EOMI.  Ears, Nose, Mouth, Throat: EARS: External bilateral ears reveals normal helix, tragus and ear lobe. Bilateral canals are normal. Both tympanic membranes are pearly gray and landmarks normal .   NOSE: Nasal mucosa in both nostrils reveals no polyps, ulcerations, or lesions. Teeth reveal good repair. Posterior pharynx reveals no abnormalities.  Neck: Neck reveals supple, no adenopathy, no thyromegaly, or carotid bruits.  Chest: Lungs are clear to auscultation bilaterally with no wheezes, rales, or rhonchi.  Cardiovascular: RRR without MRG. Bilateral DP pulses are 2+. Extremities reveal no cyanosis, clubbing, or edema.  Abdomen: " Abdomen is soft, NT, ND with no masses.  Genitourinary: Deferred  Musculoskeletal: Pain on standing to the left knee.  5/5 and equal strength in bilateral upper and lower extremities. Diminished pedal pulses bilaterally  Skin: Patient has an epidermal cyst on the right upper back. Skin reveals good turgor and no rashes .  Neurological: Neuro: Intact and non-focal.  Psychiatric: Patient has appropriate judgement. Patient has good insight. Patient's mood is appropriate.     Assessment/Plan   Problem List Items Addressed This Visit             ICD-10-CM    Type 2 diabetes mellitus without complication (Multi) needs workup.  Will get an A1c and CMP in the near future otherwise continue on metformin  mg twice daily.  Continue on Mounjaro 5 mg weekly. E11.9    Relevant Orders    CBC and Auto Differential    Comprehensive Metabolic Panel    Hemoglobin A1C    Urinalysis with Reflex Microscopic     Other Visit Diagnoses         Codes    Essential hypertension, benign    -  Primary stable.  Continue on lisinopril 20 mg daily. I10    Benign prostatic hyperplasia without lower urinary tract symptoms    chronic.  Continue on tamsulosin 0.4 mg daily. N40.0    Relevant Medications    tamsulosin (Flomax) 0.4 mg 24 hr capsule    Hypercholesteremia    presumed stable.  Will get a lipid panel in the near future.  Continue on atorvastatin 80 mg daily. E78.00    Relevant Orders    CBC and Auto Differential    Comprehensive Metabolic Panel    Lipid Panel    Urinalysis with Reflex Microscopic    Well adult exam    normal exam. Z00.00    Relevant Orders    Comprehensive Metabolic Panel    Screening PSA (prostate specific antigen)    needs workup will get a PSA in the near future. Z12.5    Relevant Orders    Prostate Specific Antigen, Screen    Hypertension, unspecified type     I10    Relevant Medications    lisinopril 20 mg tablet    Chronic pain of left knee    chronic.  Patient to see Ortho in the near future. M25.562, G89.29

## 2024-12-30 ENCOUNTER — HOSPITAL ENCOUNTER (OUTPATIENT)
Dept: RADIOLOGY | Facility: HOSPITAL | Age: 64
Discharge: HOME | End: 2024-12-30
Payer: COMMERCIAL

## 2024-12-30 DIAGNOSIS — M85.60 BONE CYST: ICD-10-CM

## 2024-12-30 PROCEDURE — 2550000001 HC RX 255 CONTRASTS: Performed by: ORTHOPAEDIC SURGERY

## 2024-12-30 PROCEDURE — 73723 MRI JOINT LWR EXTR W/O&W/DYE: CPT | Mod: LEFT SIDE | Performed by: RADIOLOGY

## 2024-12-30 PROCEDURE — A9575 INJ GADOTERATE MEGLUMI 0.1ML: HCPCS | Performed by: ORTHOPAEDIC SURGERY

## 2024-12-30 PROCEDURE — RXMED WILLOW AMBULATORY MEDICATION CHARGE

## 2024-12-30 PROCEDURE — 73723 MRI JOINT LWR EXTR W/O&W/DYE: CPT | Mod: LT

## 2024-12-30 RX ORDER — GADOTERATE MEGLUMINE 376.9 MG/ML
0.2 INJECTION INTRAVENOUS
Status: COMPLETED | OUTPATIENT
Start: 2024-12-30 | End: 2024-12-30

## 2024-12-30 RX ADMIN — GADOTERATE MEGLUMINE 15 ML: 376.9 INJECTION INTRAVENOUS at 12:54

## 2025-01-02 ENCOUNTER — OFFICE VISIT (OUTPATIENT)
Dept: ORTHOPEDIC SURGERY | Facility: CLINIC | Age: 65
End: 2025-01-02
Payer: COMMERCIAL

## 2025-01-02 DIAGNOSIS — M25.562 ACUTE PAIN OF LEFT KNEE: Primary | ICD-10-CM

## 2025-01-02 PROCEDURE — 4010F ACE/ARB THERAPY RXD/TAKEN: CPT | Performed by: ORTHOPAEDIC SURGERY

## 2025-01-02 PROCEDURE — 1036F TOBACCO NON-USER: CPT | Performed by: ORTHOPAEDIC SURGERY

## 2025-01-02 PROCEDURE — 99214 OFFICE O/P EST MOD 30 MIN: CPT | Performed by: ORTHOPAEDIC SURGERY

## 2025-01-02 ASSESSMENT — PAIN - FUNCTIONAL ASSESSMENT: PAIN_FUNCTIONAL_ASSESSMENT: 0-10

## 2025-01-02 ASSESSMENT — PAIN SCALES - GENERAL: PAINLEVEL_OUTOF10: 4

## 2025-01-02 NOTE — PROGRESS NOTES
This is a consultation from Dr. Aba Horne MD for   Chief Complaint   Patient presents with    Left Knee - Pain     Mri review        This is a 64 y.o. male who presents for follow-up for his left knee.  Patient has pain in his left knee with a lateral unicompartmental knee arthroplasty.  He has a large cyst which was evaluated by MRI.  He states today he has a lot of pain in his posterior thigh going down his leg.  He also has pain in the lateral knee.  No numbness or tingling in the leg, no previous injections or surgeries in the lumbar spine.  Denies any bowel or bladder functional changes.    Physical Exam    There has been no interval change in this patient's past medical, surgical, medications, allergies, family history or social history since the most recent visit to a provider within our department. 14 point review of systems was performed, reviewed, and negative except for pertinent positives documented in the history of present illness.     Constitutional: well developed, well nourished male in no acute distress  Psychiatric: normal mood, appropriate affect  Eyes: sclera anicteric  HENT: normocephalic/atraumatic  CV: regular rate and rhythm   Respiratory: non labored breathing  Integumentary: no rash  Neurological: moves all extremities    Left knee examination: Well-healed surgical incision erythema no drainage grossly stable to varus and valgus stress, he is mainly tender laterally, not a lot of tenderness medially today.  Neurovascular intact distally    MRI of his left knee was reviewed, appears to be benign cystic structure mostly subchondral cyst in the left knee.  Does not appear to involve the tibial component of his unicompartmental knee arthroplasty.  But does come very close.    Procedures      Impression/Plan: This is a 64 y.o. male with a large subchondral cyst in the tibia and a painful unicompartmental knee arthroplasty.  He does have some radicular symptoms we will get him evaluated  by pain management.  This is likely a benign process in his left knee however we will keep his appointment with orthopedic oncology.  Discussed with him that it is unclear to me from the information available right now and his exam that the cystic area is actually the basis for his pain or that it is destabilizing his current prosthesis.  Would recommend evaluation by pain management to rule out significant spine involvement.  Once he is evaluated orthopod orthopedic oncology will revisit this in consider other sources for his pain.    BMI Readings from Last 1 Encounters:   12/24/24 27.29 kg/m²      Lab Results   Component Value Date    CREATININE 0.80 04/15/2024     Tobacco Use: Medium Risk (1/2/2025)    Patient History     Smoking Tobacco Use: Former     Smokeless Tobacco Use: Never     Passive Exposure: Not on file      Computed MELD 3.0 unavailable. One or more values for this score either were not found within the given timeframe or did not fit some other criterion.  Computed MELD-Na unavailable. One or more values for this score either were not found within the given timeframe or did not fit some other criterion.       Lab Results   Component Value Date    HGBA1C 5.7 06/04/2024     Lab Results   Component Value Date    STAPHMRSASCR No Staphylococcus aureus isolated 04/15/2024

## 2025-01-03 ENCOUNTER — PHARMACY VISIT (OUTPATIENT)
Dept: PHARMACY | Facility: CLINIC | Age: 65
End: 2025-01-03
Payer: MEDICARE

## 2025-01-05 ENCOUNTER — APPOINTMENT (OUTPATIENT)
Dept: RADIOLOGY | Facility: HOSPITAL | Age: 65
End: 2025-01-05
Payer: COMMERCIAL

## 2025-01-14 ENCOUNTER — APPOINTMENT (OUTPATIENT)
Dept: ORTHOPEDIC SURGERY | Age: 65
End: 2025-01-14
Payer: COMMERCIAL

## 2025-01-14 VITALS — WEIGHT: 155 LBS | BODY MASS INDEX: 25.83 KG/M2 | HEIGHT: 65 IN

## 2025-01-14 DIAGNOSIS — M85.60 BONE CYST: Primary | ICD-10-CM

## 2025-01-14 PROCEDURE — 1036F TOBACCO NON-USER: CPT | Performed by: STUDENT IN AN ORGANIZED HEALTH CARE EDUCATION/TRAINING PROGRAM

## 2025-01-14 PROCEDURE — 4010F ACE/ARB THERAPY RXD/TAKEN: CPT | Performed by: STUDENT IN AN ORGANIZED HEALTH CARE EDUCATION/TRAINING PROGRAM

## 2025-01-14 PROCEDURE — 3008F BODY MASS INDEX DOCD: CPT | Performed by: STUDENT IN AN ORGANIZED HEALTH CARE EDUCATION/TRAINING PROGRAM

## 2025-01-14 PROCEDURE — 99204 OFFICE O/P NEW MOD 45 MIN: CPT | Performed by: STUDENT IN AN ORGANIZED HEALTH CARE EDUCATION/TRAINING PROGRAM

## 2025-01-14 ASSESSMENT — PATIENT HEALTH QUESTIONNAIRE - PHQ9
SUM OF ALL RESPONSES TO PHQ9 QUESTIONS 1 AND 2: 0
1. LITTLE INTEREST OR PLEASURE IN DOING THINGS: NOT AT ALL
2. FEELING DOWN, DEPRESSED OR HOPELESS: NOT AT ALL

## 2025-01-14 ASSESSMENT — PAIN SCALES - GENERAL: PAINLEVEL_OUTOF10: 3

## 2025-01-14 ASSESSMENT — PAIN - FUNCTIONAL ASSESSMENT: PAIN_FUNCTIONAL_ASSESSMENT: 0-10

## 2025-01-14 ASSESSMENT — ENCOUNTER SYMPTOMS
DEPRESSION: 0
LOSS OF SENSATION IN FEET: 0
OCCASIONAL FEELINGS OF UNSTEADINESS: 0

## 2025-01-14 NOTE — LETTER
January 14, 2025     Florencio Pruitt MD  [address] [address] [facility] [address]    Patient: Doug Encinas   YOB: 1960   Date of Visit: 1/14/2025       Dear Dr. Florencio Pruitt MD:    Thank you for referring Doug Encinas to me for evaluation. Below are my notes for this consultation.  If you have questions, please do not hesitate to call me. I look forward to following your patient along with you.       Sincerely,     Brayan Ford MD      CC: No Recipients  ______________________________________________________________________________________    Orthopaedic Surgery  New Patient Clinic Note    Doug Encinas [id number] January 14, 2025    Reason for Consult: left knee lytic lesion    HPI: Patient is a 64 y/o male with a history of left partial knee replacement 15 years ago which he previously did well with, now presenting with 2 months of atraumatic left knee pain. The pain is localized over the lateral joint line primarily. He also notes associated swelling and stiffness. Recent imaging obtained by one of our arthroplasty colleagues at initial presentation revealed a lytic lesion involving the proximal tibia prompting referral to rule out any concerning etiologies. He denies fevers/chills, redness or warmth over the knee. WBC/CRP/ESR all WNL    ROS:  15 point review of systems collected per intake sheet and negative except for as noted in HPI.    PMH:   Past Medical History:   Diagnosis Date   • BPH (benign prostatic hyperplasia)    • Diabetes mellitus (Multi)    • GERD (gastroesophageal reflux disease)    • Hyperlipidemia    • Hypertension    • Nephrolithiasis     No history of DVT.     PSH:    Past Surgical History:   Procedure Laterality Date   • CERVICAL SPINE SURGERY  04/19/2024   • COLONOSCOPY  2020   • KNEE ARTHROPLASTY Left     (partial)   • TOE SURGERY Bilateral     Great toe arthroplasty        SHx: No smoking. No IVDU    Meds:   Current Outpatient Medications on File Prior to Visit    Medication Sig Dispense Refill   • atorvastatin (Lipitor) 80 mg tablet TAKE 1 TABLET DAILY 90 tablet 3   • ibuprofen 600 mg tablet Take 1 tablet (600 mg) by mouth 4 times a day as needed for mild pain (1 - 3) (pain). 90 tablet 0   • lisinopril 20 mg tablet Take 1 tablet (20 mg) by mouth once daily. 90 tablet 3   • metFORMIN XR (Glucophage-XR) 750 mg 24 hr tablet TAKE 1 TABLET TWICE A  tablet 0   • omeprazole (PriLOSEC) 20 mg DR capsule TAKE 1 CAPSULE DAILY 90 capsule 3   • tamsulosin (Flomax) 0.4 mg 24 hr capsule Take 1 capsule (0.4 mg) by mouth once daily. 90 capsule 2   • tirzepatide (Mounjaro) 5 mg/0.5 mL pen injector Inject 5 mg under the skin 1 (one) time per week. 6 mL 3     No current facility-administered medications on file prior to visit.       PHYSICAL EXAM    GEN: AaOx4, NAD  HEENT: normocephalic atraumatic, EOMI, MMM, pupils equal and round  PSYCH: appropriate mood and affect  RESP: nonlabored breathing on room air  CARDIAC: Extremities WWP, RRR to peripheral palpation  NEURO: CN 2-12 grossly intact  SKIN: skin intact, no redness or warmth    MSK: LLE  - well healed surgical incision over left knee without erythema   - moderate effusion left knee  - ROM 5-110 with mild crepitus  - TTP over lateral joint line  - non-tender over proximal tibia  - motor/sensation intact distally  - extremity wwp    Imaging:    XR of the left knee, obtained and personally reviewed today, shows a lytic lesion involving the proximal tibia as well as intact unicompartmental partial knee replacement. MRI with contrast of left knee obtained and personally reviewed today shows a non-enhancing cystic lesion most consistent with large subchondral cyst. No evidence of features concerning for malignancy at this time.      Assessment:    66 y/o male with left knee pain consistent with progression of arthritis and likely early wear of his partial knee replacement. We discussed that the lesion involving his proximal tibia is  likely a large subchondral bone cyst related to his underlying arthritis and early wear of his partial knee replacement. We discussed that there is no evidence of concerning features at this time and we do not see any contraindications to proceeding with revision knee replacement if he elects to have that done.     Plan:    Follow up with Dr. Pruitt for consideration of revision total knee  Weightbearing as tolerated  No indication for surveillance imaging at this time.

## 2025-01-14 NOTE — PROGRESS NOTES
Orthopaedic Surgery  New Patient Clinic Note    Doug Encinas [id number] January 14, 2025    Reason for Consult: left knee lytic lesion    HPI: Patient is a 64 y/o male with a history of left partial knee replacement 15 years ago which he previously did well with, now presenting with 2 months of atraumatic left knee pain. The pain is localized over the lateral joint line primarily. He also notes associated swelling and stiffness. Recent imaging obtained by one of our arthroplasty colleagues at initial presentation revealed a lytic lesion involving the proximal tibia prompting referral to rule out any concerning etiologies. He denies fevers/chills, redness or warmth over the knee. WBC/CRP/ESR all WNL    ROS:  15 point review of systems collected per intake sheet and negative except for as noted in HPI.    PMH:   Past Medical History:   Diagnosis Date    BPH (benign prostatic hyperplasia)     Diabetes mellitus (Multi)     GERD (gastroesophageal reflux disease)     Hyperlipidemia     Hypertension     Nephrolithiasis     No history of DVT.     PSH:    Past Surgical History:   Procedure Laterality Date    CERVICAL SPINE SURGERY  04/19/2024    COLONOSCOPY  2020    KNEE ARTHROPLASTY Left     (partial)    TOE SURGERY Bilateral     Great toe arthroplasty        SHx: No smoking. No IVDU    Meds:   Current Outpatient Medications on File Prior to Visit   Medication Sig Dispense Refill    atorvastatin (Lipitor) 80 mg tablet TAKE 1 TABLET DAILY 90 tablet 3    ibuprofen 600 mg tablet Take 1 tablet (600 mg) by mouth 4 times a day as needed for mild pain (1 - 3) (pain). 90 tablet 0    lisinopril 20 mg tablet Take 1 tablet (20 mg) by mouth once daily. 90 tablet 3    metFORMIN XR (Glucophage-XR) 750 mg 24 hr tablet TAKE 1 TABLET TWICE A  tablet 0    omeprazole (PriLOSEC) 20 mg DR capsule TAKE 1 CAPSULE DAILY 90 capsule 3    tamsulosin (Flomax) 0.4 mg 24 hr capsule Take 1 capsule (0.4 mg) by mouth once daily. 90 capsule 2     tirzepatide (Mounjaro) 5 mg/0.5 mL pen injector Inject 5 mg under the skin 1 (one) time per week. 6 mL 3     No current facility-administered medications on file prior to visit.       PHYSICAL EXAM    GEN: AaOx4, NAD  HEENT: normocephalic atraumatic, EOMI, MMM, pupils equal and round  PSYCH: appropriate mood and affect  RESP: nonlabored breathing on room air  CARDIAC: Extremities WWP, RRR to peripheral palpation  NEURO: CN 2-12 grossly intact  SKIN: skin intact, no redness or warmth    MSK: LLE  - well healed surgical incision over left knee without erythema   - moderate effusion left knee  - ROM 5-110 with mild crepitus  - TTP over lateral joint line  - non-tender over proximal tibia  - motor/sensation intact distally  - extremity wwp    Imaging:    XR of the left knee, obtained and personally reviewed today, shows a lytic lesion involving the proximal tibia as well as intact unicompartmental partial knee replacement. MRI with contrast of left knee obtained and personally reviewed today shows a non-enhancing cystic lesion most consistent with large subchondral cyst. No evidence of features concerning for malignancy at this time.      Assessment:    64 y/o male with left knee pain consistent with progression of arthritis and likely early wear of his partial knee replacement. We discussed that the lesion involving his proximal tibia is likely a large subchondral bone cyst related to his underlying arthritis and early wear of his partial knee replacement. We discussed that there is no evidence of concerning features at this time and we do not see any contraindications to proceeding with revision knee replacement if he elects to have that done.     Plan:    Follow up with Dr. Pruitt for consideration of revision total knee  Weightbearing as tolerated  No indication for surveillance imaging at this time.

## 2025-01-24 PROCEDURE — RXMED WILLOW AMBULATORY MEDICATION CHARGE

## 2025-01-28 ENCOUNTER — PHARMACY VISIT (OUTPATIENT)
Dept: PHARMACY | Facility: CLINIC | Age: 65
End: 2025-01-28
Payer: MEDICARE

## 2025-02-06 ENCOUNTER — APPOINTMENT (OUTPATIENT)
Dept: ORTHOPEDIC SURGERY | Facility: CLINIC | Age: 65
End: 2025-02-06
Payer: COMMERCIAL

## 2025-02-06 DIAGNOSIS — M25.562 ACUTE PAIN OF LEFT KNEE: Primary | ICD-10-CM

## 2025-02-06 PROCEDURE — 1036F TOBACCO NON-USER: CPT | Performed by: ORTHOPAEDIC SURGERY

## 2025-02-06 PROCEDURE — 99215 OFFICE O/P EST HI 40 MIN: CPT | Performed by: ORTHOPAEDIC SURGERY

## 2025-02-06 PROCEDURE — 4010F ACE/ARB THERAPY RXD/TAKEN: CPT | Performed by: ORTHOPAEDIC SURGERY

## 2025-02-06 RX ORDER — CEFAZOLIN SODIUM 2 G/100ML
2 INJECTION, SOLUTION INTRAVENOUS ONCE
OUTPATIENT
Start: 2025-02-06 | End: 2025-02-06

## 2025-02-06 RX ORDER — CELECOXIB 400 MG/1
400 CAPSULE ORAL ONCE
OUTPATIENT
Start: 2025-02-06 | End: 2025-02-06

## 2025-02-06 RX ORDER — ACETAMINOPHEN 325 MG/1
975 TABLET ORAL ONCE
OUTPATIENT
Start: 2025-02-06 | End: 2025-02-06

## 2025-02-06 ASSESSMENT — PAIN SCALES - GENERAL: PAINLEVEL_OUTOF10: 4

## 2025-02-06 ASSESSMENT — PAIN - FUNCTIONAL ASSESSMENT: PAIN_FUNCTIONAL_ASSESSMENT: 0-10

## 2025-02-06 NOTE — PROGRESS NOTES
This is a consultation from Dr. Aba Horne MD for   Chief Complaint   Patient presents with    Left Knee - Pain       This is a 65 y.o. male who presents for follow-up for his left knee.  Patient has a painful unicompartmental knee arthroplasty with associated subchondral cystic change.  He continues have significant pain in the knee, pain over the anterior lateral knee has gotten worse and been severely exacerbated since the last time I saw him.  He has since seen in orthopedic oncology specialist and ruled out any malignancy.  He had extensive trial of nonsurgical management clued the use of anti-inflammatories physical therapy activity modification use of assistive devices.  Despite that he has severe and worsening pain which impacts quality of life and activities of daily living    Physical Exam    There has been no interval change in this patient's past medical, surgical, medications, allergies, family history or social history since the most recent visit to a provider within our department. 14 point review of systems was performed, reviewed, and negative except for pertinent positives documented in the history of present illness.     Constitutional: well developed, well nourished male in no acute distress  Psychiatric: normal mood, appropriate affect  Eyes: sclera anicteric  HENT: normocephalic/atraumatic  CV: regular rate and rhythm   Respiratory: non labored breathing  Integumentary: no rash  Neurological: moves all extremities    Left knee examination: Well-healed surgical incision erythema no drainage grossly stable to varus and valgus stress and stable with Lachman.  Pain with palpation over the anterior medial and lateral knee.  Neurovascular tact distally    Xrays were ordered by me, they were reviewed and independently interpreted by me today, they show a lateral unicompartmental knee arthroplasty in reasonable position but with extensive cystic change in the tibia and the central tibia and  underneath the tibial tray of the implant.    Procedures      Impression/Plan: This is a 65 y.o. male with extensive cystic change in the tibia after unicompartmental knee arthroplasty.  I discussed the situation with the patient in detail clued the clinical radiographic findings treatment options risks and benefits.  Although there does not appear to be loosening of the implant on x-ray, if there is extension of the area of cystic change underneath the tray which suggest that this problem is causing his pain and resulting in painful arthroplasty.  If likely the cyst is related either to worsening degenerative change or potentially polyethylene wear.  We discussed nonsurgical treatments for this, this would be continuing the way he is going and using anti-inflammatories bracing and cane.  He is tried this extensively and it is getting worse.  The pain is significantly impacting his quality of life.  We discussed conversion to total knee arthroplasty, the goal of this would be to reduce his pain improve function and quality of life.  The patient would like to proceed with this, we had extensive discussion of the risks.  The full list of risks is listed below but there is increased risk and conversion surgery because of the need to remove the implant.  There is increased risk of infection, there is extensive bone loss in this knee and there may be increased risk of fracture neurovascular damage or ligamentous instability.  The patient is aware the increased risk of is still like to go ahead.    Discussed with the patient that during total knee arthroplasty prosthetic resurfacing of the patella may or may not be performed at the discretion of thesurgeon during surgery. In the event that prosthetic patellar resurfacing is not performed, nonprosthetic arthroplasty will be performed with removal of bone spurs, circumferential synovectomy and electrocautery. Patient was informed that anterior knee pain and patellofemoral  crepitus can potentially occur after knee surgery with or without prosthetic patellar resurfacing.       Risks include: Pain, blood loss, damage to nearby anatomical structures including but not limited to nerves or blood vessels muscles tendons and bone, failure surgery to ameliorate symptoms, persistence of pain surrounding the affected joint, mechanical failure of the prosthesis including but not limited to loosening of the prosthesis from bone ,breakage of the prosthesis and dislocation of the prosthesis, change in length or appearance of a limb, infection possibly necessitating further surgery, removal of the prosthesis, or limb amputation, the need for additional surgery for other reasons, blood clots, amputation, and death. No guarantees were implied or given.  All questions were answered and the patient voiced their understanding.     A complete set of surgical instructions was given to the patient. The patient was educated regarding preoperative nutrition, preparation of their home for postoperative rehabilitation, choosing a care partner, medical and dental clearance, the cessation of medications that can cause bleeding or presenting to risk for infection, chlorhexidine bath, nasal swab and decontamination, post operative follow up, and need for medical equipment. Patient was also educated regarding the possibility of same day surgery and related criteria and protocols. The patient will attend our joint class further education, and thereafter they will return for a preoperative visit. A presurgery education booklet was also given to the patient.     surgical plan: Revision and conversion to total knee of the left knee  implants: DePuy with full revision backup  approach: Standard  DVT ppx aspirin  drugs to stop none  allergy to abx none  post operative abx: ancef +/- vanc (per protocol)  special clearance needed none    BMI Readings from Last 1 Encounters:   01/14/25 25.79 kg/m²      Lab Results    Component Value Date    CREATININE 0.80 04/15/2024     Tobacco Use: Medium Risk (2/6/2025)    Patient History     Smoking Tobacco Use: Former     Smokeless Tobacco Use: Never     Passive Exposure: Not on file      Computed MELD 3.0 unavailable. One or more values for this score either were not found within the given timeframe or did not fit some other criterion.  Computed MELD-Na unavailable. One or more values for this score either were not found within the given timeframe or did not fit some other criterion.       Lab Results   Component Value Date    HGBA1C 5.7 06/04/2024     Lab Results   Component Value Date    STAPHMRSASCR No Staphylococcus aureus isolated 04/15/2024

## 2025-02-19 ENCOUNTER — APPOINTMENT (OUTPATIENT)
Dept: PREADMISSION TESTING | Facility: HOSPITAL | Age: 65
End: 2025-02-19
Payer: COMMERCIAL

## 2025-02-21 PROCEDURE — RXMED WILLOW AMBULATORY MEDICATION CHARGE

## 2025-02-25 ENCOUNTER — PHARMACY VISIT (OUTPATIENT)
Dept: PHARMACY | Facility: CLINIC | Age: 65
End: 2025-02-25
Payer: MEDICARE

## 2025-03-06 ENCOUNTER — APPOINTMENT (OUTPATIENT)
Dept: ORTHOPEDIC SURGERY | Facility: CLINIC | Age: 65
End: 2025-03-06
Payer: COMMERCIAL

## 2025-03-06 NOTE — GROUP NOTE
In addition to the group class activities, Doug Ce had the following lab work completed:  No orders of the defined types were placed in this encounter.      A new History and Physical was not completed.    This class lasted approximately 1 hour and had 15 participants. The nurse instructor covered the following topics:  Overview of joint replacement surgery.  Instructions for at-home preparation for surgery (included below).  Expectations before and after surgery including hospital stay.  Discharge planning and home care options.  Physical therapy overview and review of at-home exercises.    Information for Surgery or Hospital Stay  For morning surgery, do not eat or drink after midnight. This includes water, mints, and chewing gum.  For afternoon surgery, you may have a clear liquid breakfast before 6 A.M. Clear liquids are anything you can see through, like apple juice, cranberry juice, tea or black coffee without cream. Do not eat or drink after 6 A.M. This includes water.  Wear loose fitting clothes--something that can be slipped on and off easily over a dressing.  Bring a walker or crutches with you to the hospital on the day of surgery.  Please inform the office if you have any symptoms of illness or fever prior to surgery.  You need to have transportation home when discharged, as you will be medicated.  STOP any aspirin or anti-inflammatory products (Aleve, Advil, etc.) 5-7 days before surgery.  You may use Tylenol or Extra Strength Tylenol.  STOP any vitamins or herbal products 10 days before surgery.

## 2025-03-06 NOTE — PREADMISSION SCREENING NOTE
Pre Surgery Discharge Planning :    Procedure:  TKA REVISION UNI TO TKA    Date of procedure:  4/29/25    Address you will be discharged to:  HOME:  Address   [address]  [place] [address]          Care Partner:  RIGO / WIFE    Current mobility status:  IS    Stairs into house: 2    Stairs inside house: 0    DME:   WALKER    Joint Class:  3/6/2025    Same Day Surgery:  NO

## 2025-03-07 DIAGNOSIS — E11.9 TYPE 2 DIABETES MELLITUS WITHOUT COMPLICATION, WITH LONG-TERM CURRENT USE OF INSULIN (MULTI): ICD-10-CM

## 2025-03-07 DIAGNOSIS — Z79.4 TYPE 2 DIABETES MELLITUS WITHOUT COMPLICATION, WITH LONG-TERM CURRENT USE OF INSULIN (MULTI): ICD-10-CM

## 2025-03-07 RX ORDER — METFORMIN HYDROCHLORIDE 750 MG/1
750 TABLET, EXTENDED RELEASE ORAL 2 TIMES DAILY
Qty: 180 TABLET | Refills: 1 | Status: SHIPPED | OUTPATIENT
Start: 2025-03-07

## 2025-04-01 ENCOUNTER — HOSPITAL ENCOUNTER (OUTPATIENT)
Dept: RADIOLOGY | Facility: HOSPITAL | Age: 65
Discharge: HOME | End: 2025-04-01
Payer: COMMERCIAL

## 2025-04-01 DIAGNOSIS — M25.562 PAIN IN LEFT KNEE: ICD-10-CM

## 2025-04-01 PROCEDURE — 77073 BONE LENGTH STUDIES: CPT

## 2025-04-01 PROCEDURE — 77073 BONE LENGTH STUDIES: CPT | Performed by: RADIOLOGY

## 2025-04-15 ENCOUNTER — APPOINTMENT (OUTPATIENT)
Dept: PREADMISSION TESTING | Facility: HOSPITAL | Age: 65
End: 2025-04-15
Payer: COMMERCIAL

## 2025-04-15 ENCOUNTER — PRE-ADMISSION TESTING (OUTPATIENT)
Dept: PREADMISSION TESTING | Facility: HOSPITAL | Age: 65
End: 2025-04-15
Payer: COMMERCIAL

## 2025-04-15 VITALS
TEMPERATURE: 97.3 F | DIASTOLIC BLOOD PRESSURE: 71 MMHG | SYSTOLIC BLOOD PRESSURE: 106 MMHG | OXYGEN SATURATION: 96 % | RESPIRATION RATE: 18 BRPM | WEIGHT: 170.42 LBS | BODY MASS INDEX: 28.39 KG/M2 | HEIGHT: 65 IN | HEART RATE: 75 BPM

## 2025-04-15 DIAGNOSIS — Z01.818 PREOPERATIVE EXAMINATION: Primary | ICD-10-CM

## 2025-04-15 DIAGNOSIS — M25.562 ACUTE PAIN OF LEFT KNEE: ICD-10-CM

## 2025-04-15 LAB
ABO GROUP (TYPE) IN BLOOD: NORMAL
ALBUMIN SERPL BCP-MCNC: 4.4 G/DL (ref 3.4–5)
ALP SERPL-CCNC: 61 U/L (ref 33–136)
ALT SERPL W P-5'-P-CCNC: 26 U/L (ref 10–52)
ANION GAP SERPL CALC-SCNC: 15 MMOL/L (ref 10–20)
ANTIBODY SCREEN: NORMAL
AST SERPL W P-5'-P-CCNC: 21 U/L (ref 9–39)
BASOPHILS # BLD AUTO: 0.02 X10*3/UL (ref 0–0.1)
BASOPHILS NFR BLD AUTO: 0.5 %
BILIRUB SERPL-MCNC: 1 MG/DL (ref 0–1.2)
BUN SERPL-MCNC: 22 MG/DL (ref 6–23)
CALCIUM SERPL-MCNC: 8.3 MG/DL (ref 8.6–10.3)
CHLORIDE SERPL-SCNC: 100 MMOL/L (ref 98–107)
CO2 SERPL-SCNC: 23 MMOL/L (ref 21–32)
CREAT SERPL-MCNC: 0.82 MG/DL (ref 0.5–1.3)
EGFRCR SERPLBLD CKD-EPI 2021: >90 ML/MIN/1.73M*2
EOSINOPHIL # BLD AUTO: 0.06 X10*3/UL (ref 0–0.7)
EOSINOPHIL NFR BLD AUTO: 1.4 %
ERYTHROCYTE [DISTWIDTH] IN BLOOD BY AUTOMATED COUNT: 13.6 % (ref 11.5–14.5)
GLUCOSE SERPL-MCNC: 113 MG/DL (ref 74–99)
HCT VFR BLD AUTO: 45.6 % (ref 41–52)
HGB BLD-MCNC: 15.6 G/DL (ref 13.5–17.5)
IMM GRANULOCYTES # BLD AUTO: 0.03 X10*3/UL (ref 0–0.7)
IMM GRANULOCYTES NFR BLD AUTO: 0.7 % (ref 0–0.9)
LYMPHOCYTES # BLD AUTO: 1.12 X10*3/UL (ref 1.2–4.8)
LYMPHOCYTES NFR BLD AUTO: 25.9 %
MCH RBC QN AUTO: 29.7 PG (ref 26–34)
MCHC RBC AUTO-ENTMCNC: 34.2 G/DL (ref 32–36)
MCV RBC AUTO: 87 FL (ref 80–100)
MONOCYTES # BLD AUTO: 0.92 X10*3/UL (ref 0.1–1)
MONOCYTES NFR BLD AUTO: 21.3 %
NEUTROPHILS # BLD AUTO: 2.17 X10*3/UL (ref 1.2–7.7)
NEUTROPHILS NFR BLD AUTO: 50.2 %
NRBC BLD-RTO: 0 /100 WBCS (ref 0–0)
PLATELET # BLD AUTO: 203 X10*3/UL (ref 150–450)
POTASSIUM SERPL-SCNC: 4 MMOL/L (ref 3.5–5.3)
PROT SERPL-MCNC: 6.5 G/DL (ref 6.4–8.2)
RBC # BLD AUTO: 5.25 X10*6/UL (ref 4.5–5.9)
RH FACTOR (ANTIGEN D): NORMAL
SODIUM SERPL-SCNC: 134 MMOL/L (ref 136–145)
WBC # BLD AUTO: 4.3 X10*3/UL (ref 4.4–11.3)

## 2025-04-15 PROCEDURE — 87081 CULTURE SCREEN ONLY: CPT | Mod: GEALAB

## 2025-04-15 PROCEDURE — 85025 COMPLETE CBC W/AUTO DIFF WBC: CPT

## 2025-04-15 PROCEDURE — 99204 OFFICE O/P NEW MOD 45 MIN: CPT | Performed by: NURSE PRACTITIONER

## 2025-04-15 PROCEDURE — 80053 COMPREHEN METABOLIC PANEL: CPT

## 2025-04-15 PROCEDURE — 36415 COLL VENOUS BLD VENIPUNCTURE: CPT

## 2025-04-15 PROCEDURE — 86901 BLOOD TYPING SEROLOGIC RH(D): CPT

## 2025-04-15 RX ORDER — GABAPENTIN 300 MG/1
300 CAPSULE ORAL NIGHTLY
COMMUNITY

## 2025-04-15 RX ORDER — CHLORHEXIDINE GLUCONATE ORAL RINSE 1.2 MG/ML
15 SOLUTION DENTAL DAILY
Qty: 30 ML | Refills: 0 | Status: SHIPPED | OUTPATIENT
Start: 2025-04-15 | End: 2025-04-17

## 2025-04-15 RX ORDER — CHLORHEXIDINE GLUCONATE 40 MG/ML
1 SOLUTION TOPICAL DAILY
Start: 2025-04-15 | End: 2025-04-20

## 2025-04-15 ASSESSMENT — DUKE ACTIVITY SCORE INDEX (DASI)
CAN YOU PARTICIPATE IN STRENOUS SPORTS LIKE SWIMMING, SINGLES TENNIS, FOOTBALL, BASKETBALL, OR SKIING: NO
TOTAL_SCORE: 50.7
CAN YOU RUN A SHORT DISTANCE: YES
CAN YOU CLIMB A FLIGHT OF STAIRS OR WALK UP A HILL: YES
CAN YOU HAVE SEXUAL RELATIONS: YES
CAN YOU DO YARD WORK LIKE RAKING LEAVES, WEEDING OR PUSHING A MOWER: YES
CAN YOU DO MODERATE WORK AROUND THE HOUSE LIKE VACUUMING, SWEEPING FLOORS OR CARRYING GROCERIES: YES
CAN YOU DO LIGHT WORK AROUND THE HOUSE LIKE DUSTING OR WASHING DISHES: YES
CAN YOU WALK A BLOCK OR TWO ON LEVEL GROUND: YES
CAN YOU PARTICIPATE IN MODERATE RECREATIONAL ACTIVITIES LIKE GOLF, BOWLING, DANCING, DOUBLES TENNIS OR THROWING A BASEBALL OR FOOTBALL: YES
DASI METS SCORE: 9
CAN YOU WALK INDOORS, SUCH AS AROUND YOUR HOUSE: YES
CAN YOU TAKE CARE OF YOURSELF (EAT, DRESS, BATHE, OR USE TOILET): YES
CAN YOU DO HEAVY WORK AROUND THE HOUSE LIKE SCRUBBING FLOORS OR LIFTING AND MOVING HEAVY FURNITURE: YES

## 2025-04-15 ASSESSMENT — PAIN SCALES - GENERAL: PAINLEVEL_OUTOF10: 1

## 2025-04-15 ASSESSMENT — LIFESTYLE VARIABLES: SMOKING_STATUS: NONSMOKER

## 2025-04-15 ASSESSMENT — ENCOUNTER SYMPTOMS
GASTROINTESTINAL NEGATIVE: 1
NEUROLOGICAL NEGATIVE: 1
NECK NEGATIVE: 1
CARDIOVASCULAR NEGATIVE: 1
RESPIRATORY NEGATIVE: 1
CONSTITUTIONAL NEGATIVE: 1

## 2025-04-15 ASSESSMENT — PAIN - FUNCTIONAL ASSESSMENT: PAIN_FUNCTIONAL_ASSESSMENT: 0-10

## 2025-04-15 NOTE — PREPROCEDURE INSTRUCTIONS
Thank you for visiting [facility]) today for your pre-procedure evaluation, you were seen by     Kathy Chou CNP  Pre Admission Testing  [facility]  [phone]    This summary includes instructions and information to aid you during your perioperative period.  Please read carefully. If you have any questions about your visit today, please call the number listed above.  If you become ill or have any changes to your health before your surgery, please contact your primary care provider and alert your surgeon.        Preparing for your Surgery       Exercises  Preoperative Deep Breathing Exercises  Why it is important to do deep breathing exercises before my surgery?  Deep breathing exercises strengthen your breathing muscles.  This helps you to recover after your surgery and decreases the chance of breathing complications.  How are the deep breathing exercises done?  Sit straight with your back supported.  Breathe in deeply and slowly through your nose. Your lower rib cage should expand and your abdomen may move forward.  Hold that breath for 3 to 5 seconds.  Breathe out through pursed lips, slowly and completely.  Rest and repeat 10 times every hour while awake.  Rest longer if you become dizzy or lightheaded.       Preoperative Brain Exercises    What are brain exercises?  A brain exercise is any activity that engages your thinking (cognitive) skills.    What types of activities are considered brain exercises?  Jigsaw puzzles, crossword puzzles, word jumble, memory games, word search, and many more.  Many can be found free online or on your phone via a mobile chani.    Why should I do brain exercises before my surgery?  More recent research has shown brain exercise before surgery can lower the risk of postoperative delirium (confusion) which can be especially important for older adults.  Patients who did brain exercises for 5 to 10 hours the days before surgery, cut their  risk of postoperative delirium in half up to 1 week after surgery.    Sit-to-Stand Exercise    What is the sit-to-stand exercise?  The sit-to-stand exercise strengthens the muscles of your lower body and muscles in the center of your body (core muscles for stability) helping to maintain and improve your strength and mobility.  How do I do the sit-to-stand exercise?  The goal is to do this exercise without using your arms or hands.  If this is too difficult, use your arms and hands or a chair with armrests to help slowly push yourself to the standing position and lower yourself back to the sitting position. As the movement becomes easier use your arms and hands less.    Steps to the sit-to-stand exercise  Sit up tall in a sturdy chair, knees bent, feet flat on the floor shoulder-width apart.  Shift your hips/pelvis forward in the chair to correctly position yourself for the next movement.  Lean forward at your hips.  Stand up straight putting equal weight on both feet.  Check to be sure you are properly aligned with the chair, in a slow controlled movement sit back down.  Repeat this exercise 10-15 times.  If needed you can do it fewer times until your strength improves.  Rest for 1 minute.  Do another 10-15 sit-to-stand exercises.  Try to do this in the morning and evening.        Instructions    Preoperative Fasting Guidelines    Why must I stop eating and drinking near surgery time?  With sedation, food or liquid in your stomach can enter your lungs causing serious complications  Food can increase nausea and vomiting  When do I need to stop eating and drinking before my surgery?        You are able to have 13.5 ounces of clear liquid 2 hrs prior to coming to the hospital.  No sugar, cream or red fluids.         Simple things you can do to help prevent blood clots     Blood clots are blockages that can form in the body's veins. When a blood clot forms in your deep veins, it may be called a deep vein thrombosis, or  DVT for short. Blood clots can happen in any part of the body where blood flows, but they are most common in the arms and legs. If a piece of a blood clot breaks free and travels to the lungs, it is called a pulmonary embolus (PE). A PE can be a very serious problem.         Being in the hospital or having surgery can raise your chances of getting a blood clot because you may not be well enough to move around as much as you normally do.         Ways you can help prevent blood clots in the hospital       Wearing SCDs  SCDs stands for Sequential Compression Devices.   SCDs are special sleeves that wrap around your legs. They attach to a pump that fills them with air to gently squeeze your legs every few minutes.  This helps return the blood in your legs to your heart.   SCDs should only be taken off when walking or bathing. SCDs may not be comfortable, but they can help save your life.              Pump SCD leg sleeves  Wearing compression stockings - if your doctor orders them. These special snug-fitting stockings gently squeeze your legs to help blood flow.       Walking. Walking helps move the blood in your legs.   If your doctor says it is ok, try walking the halls at least   5 times a day. Ask us to help you get up, so you don't fall.      Taking any blood-thinning medicines your doctor orders.              Ways you can help prevent blood clots at home         Wearing compression stockings - if your doctor orders them.   Walking - to help move the blood in your legs.    Taking any blood-thinning medicines your doctor orders.      Signs of a blood clot or PE    Tell your doctor or nurse right away if you have any of the problems listed below.         If you are at home, seek medical care right away. Call 911 for chest pain or problems breathing.            Signs of a blood clot (DVT) - such as pain, swelling, redness, or warmth in your arm or legs.  Signs of a pulmonary embolism (PE) - such as chest pain or  "feeling short of breath      Tobacco and Alcohol;  Do not drink alcohol or smoke within 24 hours of surgery.  It is best to quit smoking for as long as possible before any surgery or procedure.    Other Instructions  Why did I have my nose, under my arms, and groin swabbed? The purpose of the swab is to identify Staphylococcus aureus inside your nose or on your skin.  The swab was sent to the laboratory for culture.  A positive swab/culture for Staphylococcus aureus is called colonization or carriage.     What is Staphylococcus aureus? Staphylococcus aureus, also known as \"staph\", is a germ found on the skin or in the nose of healthy people.  Sometimes Staphylococcus aureus can get into the body and cause an infection.  This can be minor (such as pimples, boils, or other skin problems).  It might also be serious (such as a blood infection, pneumonia, or a surgical site infection).     What is Staphylococcus aureus colonization or carriage? Colonization or carriage means that a person has the germ but is not sick from it.  These bacteria can be spread on the hands or when breathing or sneezing.   How is Staphylococcus aureus spread? It is most often spread by close contact with a person or item that carries it.   What happens if my culture is positive for Staphylococcus aureus? Your doctor/medical team will use this information to guide any antibiotic treatment which may be necessary.  Regardless of the culture results, we will clean the inside of your nose with a betadine swab just before you have your surgery.   Will I get an infection if I have Staphylococcus aureus in my nose or on my skin? Anyone can get an infection with Staphylococcus aureus.  However, the best way to reduce your risk of infection is to follow the instructions provided to you for the use of your CHG soap and dental rinse.      Body Wash:     What is a home preoperative antibacterial shower? This shower is a way of cleaning the skin with a " germ-killing solution before surgery.  The solution contains chlorhexidine, commonly known as CHG.  CHG is a skin cleanser with germ-killing ability.  Let your doctor know if you are allergic to chlorhexidine.   Why do I need to take a preoperative antibacterial shower? Skin is not sterile.  It is best to try to make your skin as free of germs as possible before surgery.  Proper cleansing with a germ-killing soap before surgery can lower the number of germs on your skin.  This helps to reduce the risk of infection at the surgical site.    Following the instructions listed below will help you prepare your skin for surgery.   How do I use the solution?  If you plan to wash your hair, use your regular shampoo; then rinse your hair and body thoroughly to remove any shampoo residue  Wash your face with your regular soap or water only  Thoroughly rinse your body with water from the neck down  Apply Hibiclens directly on your skin or on a wet washcloth and wash gently; move away from the shower stream when applying Hibiclens to avoid rinsing it off too soon  Rinse thoroughly with warm water and keep out of eyes, ears and mouth; if Hibiclens comes in contact with these areas, rinse out promptly  Dry your skin with a towel  Do not use your regular soap after applying and rinsing with Hibiclens  Do not apply lotions or deodorants to the cleaned body area      Oral/Dental Rinse:     What is oral/dental rinse?  It is a mouthwash. It is a way of cleaning the mouth with a germ-killing solution before your surgery.  The solution contains chlorhexidine, commonly known as CHG. It is used inside the mouth to kill a bacteria known as Staphylococcus aureus.  Let your doctor know if you are allergic to Chlorhexidine.   Why do I need to use CHG oral/dental rinse? The CHG oral/dental rinse helps to kill a bacteria in your mouth known as Staphylococcus aureus.  This reduces the risk of infection at the surgical site.    Using your CHG  oral/dental rinse STEPS: Use your CHG oral/dental rinse after you brush your teeth the night before (at bedtime) and the morning of your surgery.  Follow all directions on your prescription label.    Use the cap on the container to measure 15 ml.  Swish (gargle if you can) the mouthwash in your mouth for at least 30 seconds, (do not swallow) and spit out.  After you use your CHG rinse, do not rinse your mouth with water, drink or eat.    Please refer to the prescription label for the appropriate time to resume oral intake   What side effects might I have using the CHG oral/dental rinse? CHG rinse will stick to plaque on the teeth.  Brush and floss just before use.  Teeth brushing will help avoid staining of plaque during use.          The Week before Surgery        Seven days before Surgery  Check your PAT medication instructions  Do the exercises provided to you by PAT  Arrange for a responsible, adult licensed  to take you home after surgery and stay with you for 24 hours.  You will not be permitted to drive yourself home if you have received any anesthetic/sedation  Six days before surgery  Check your PAT medication instructions  Do the exercises provided to you by PAT  Start using Chlorhexidene (CHG) body wash if prescribed  Five days before surgery  Check your PAT medication instructions  Do the exercises provided to you by PAT  Continue to use CHG body wash if prescribed  Three days before surgery  Check your PAT medication instructions  Do the exercises provided to you by PAT  Continue to use CHG body wash if prescribed  Two days before surgery  Check your PAT medication instructions  Do the exercises provided to you by PAT  Continue to use CHG body wash if prescribed    The Day before Surgery       Check your PAT medication and all other PAT instructions including when to stop eating and drinking  You will be called with your arrival time for surgery in the late afternoon.  If you do not receive a call  please reach out to [facility]. [phone]  Do not smoke or drink 24 hours before surgery  Prepare items to bring with you to the hospital  Shower with your chlorhexidine wash if prescribed  Brush your teeth and use your chlorhexidine dental rinse if prescribed    The Day of Surgery       Check your PAT medication instructions  Ensure you follow the instructions for when to stop eating and drinking  Shower, if prescribed use CHG.  Do not apply any lotions, creams, moisturizers, perfume or deodorant  Brush your teeth and use your CHG dental rinse if prescribed  Wear loose comfortable clothing  Avoid make-up  Remove  jewelry and piercings, consider professional piercing removal with a plastic spacer if needed  Bring photo ID and Insurance card  Bring an accurate medication list that includes medication dose, frequency and allergies  Bring a copy of your advanced directives (will, health care power of )  Bring any devices and controllers as well as medical devices you have been provided with for surgery (CPAP, slings, braces, etc.)  Dentures, eyeglasses, and contacts will be removed before surgery, please bring cases for contacts or glasses

## 2025-04-15 NOTE — CPM/PAT H&P
CPM/PAT Evaluation       Name: Doug Encinas (Doug Encinas)  /Age: 1960/65 y.o.     Visit Type:   In-Person       Chief Complaint: Acute pain of left knee    HPI 66 y/o male scheduled for total knee arthroplasty revision-left on 2025 with  Dr. Pruitt secondary to acute pain of left knee.  PMHX includes HTM, T2DM.  PAT is consulted today for perioperative risk stratification and optimization.      Past Medical History:   Diagnosis Date    BPH (benign prostatic hyperplasia)     Diabetes mellitus (Multi)     GERD (gastroesophageal reflux disease)     Hyperlipidemia     Hypertension     Nephrolithiasis        Past Surgical History:   Procedure Laterality Date    CERVICAL SPINE SURGERY  2024    hardware    COLONOSCOPY      KNEE ARTHROPLASTY Left     (partial)    TOE SURGERY Bilateral     Great toe arthroplasty       Patient Sexual activity questions deferred to the physician.    Family History   Problem Relation Name Age of Onset    Diabetes type I Mother      Heart disease Mother      Heart disease Father      Hypertension Father      Hypertension Sister      Coronary artery disease Brother         No Known Allergies    Prior to Admission medications    Medication Sig Start Date End Date Taking? Authorizing Provider   atorvastatin (Lipitor) 80 mg tablet TAKE 1 TABLET DAILY 24   Aba Horne MD   ibuprofen 600 mg tablet Take 1 tablet (600 mg) by mouth 4 times a day as needed for mild pain (1 - 3) (pain). 24  Aba Horne MD   lisinopril 20 mg tablet Take 1 tablet (20 mg) by mouth once daily. 24   Aba Horne MD   metFORMIN XR (Glucophage-XR) 750 mg 24 hr tablet TAKE 1 TABLET TWICE A DAY 3/7/25   Aba Horne MD   omeprazole (PriLOSEC) 20 mg DR capsule TAKE 1 CAPSULE DAILY 24   Aba Horne MD   tamsulosin (Flomax) 0.4 mg 24 hr capsule Take 1 capsule (0.4 mg) by mouth once daily. 24   Aba Horne MD   tirzepatide (Mounjaro)  "5 mg/0.5 mL pen injector Inject 5 mg under the skin 1 (one) time per week. 5/5/24 4/30/25  Aba Horne MD        [facility] ROS:   Constitutional:   neg    Neuro/Psych:   neg    Eyes:    use of corrective lenses  Ears:    hearing aides  Nose:   Mouth:   Throat:   Neck:   neg    Cardio:   neg    Respiratory:   neg    Endocrine:   GI:   neg    :   Musculoskeletal:    Pain in left knee  Hematologic:   neg    Skin:      Physical Exam  Vitals reviewed.   Constitutional:       Appearance: Normal appearance.   HENT:      Mouth/Throat:      Mouth: Mucous membranes are moist.      Pharynx: Oropharynx is clear.   Eyes:      Pupils: Pupils are equal, round, and reactive to light.   Cardiovascular:      Rate and Rhythm: Normal rate and regular rhythm.      Pulses: Normal pulses.      Heart sounds: Normal heart sounds.   Pulmonary:      Effort: Pulmonary effort is normal.      Breath sounds: Normal breath sounds.   Abdominal:      General: Bowel sounds are normal.      Palpations: Abdomen is soft.   Musculoskeletal:         General: Normal range of motion.      Cervical back: Normal range of motion and neck supple.   Skin:     General: Skin is warm and dry.   Neurological:      General: No focal deficit present.      Mental Status: He is alert and oriented to person, place, and time.   Psychiatric:         Mood and Affect: Mood normal.         Behavior: Behavior normal.          Airway    Testing/Diagnostic:     Patient Specialist/PCP:     Visit Vitals  /71   Pulse 75   Temp 36.3 °C (97.3 °F) (Temporal)   Resp 18   Ht 1.651 m (5' 5\")   Wt 77.3 kg (170 lb 6.7 oz)   SpO2 96%   BMI 28.36 kg/m²   Smoking Status Former   BSA 1.88 m²       DASI Risk Score      Flowsheet Row Pre-Admission Testing from 4/15/2025 in [facility] Pre-Admission Testing from 4/15/2024 in [facility]   Can you take care of yourself (eat, dress, bathe, or use toilet)?  2.75 filed at 04/15/2025 0837 2.75 filed at 04/15/2024 " 0840   Can you walk indoors, such as around your house? 1.75 filed at 04/15/2025 0837 1.75 filed at 04/15/2024 0840   Can you walk a block or two on level ground?  2.75 filed at 04/15/2025 0837 2.75 filed at 04/15/2024 0840   Can you climb a flight of stairs or walk up a hill? 5.5 filed at 04/15/2025 0837 5.5 filed at 04/15/2024 0840   Can you run a short distance? 8 filed at 04/15/2025 0837 8 filed at 04/15/2024 0840   Can you do light work around the house like dusting or washing dishes? 2.7 filed at 04/15/2025 0837 2.7 filed at 04/15/2024 0840   Can you do moderate work around the house like vacuuming, sweeping floors or carrying groceries? 3.5 filed at 04/15/2025 0837 3.5 filed at 04/15/2024 0840   Can you do heavy work around the house like scrubbing floors or lifting and moving heavy furniture?  8 filed at 04/15/2025 0837 8 filed at 04/15/2024 0840   Can you do yard work like raking leaves, weeding or pushing a mower? 4.5 filed at 04/15/2025 0837 4.5 filed at 04/15/2024 0840   Can you have sexual relations? 5.25 filed at 04/15/2025 0837 5.25 filed at 04/15/2024 0840   Can you participate in moderate recreational activities like golf, bowling, dancing, doubles tennis or throwing a baseball or football? 6 filed at 04/15/2025 0837 6 filed at 04/15/2024 0840   Can you participate in strenous sports like swimming, singles tennis, football, basketball, or skiing? 0 filed at 04/15/2025 0837 7.5 filed at 04/15/2024 0840   DASI SCORE 50.7 filed at 04/15/2025 0837 58.2 filed at 04/15/2024 0840   METS Score (Will be calculated only when all the questions are answered) 9 filed at 04/15/2025 0837 9.9 filed at 04/15/2024 0840          Caprini DVT Assessment      Flowsheet Row Pre-Admission Testing from 4/15/2025 in [facility] Admission (Discharged) from 4/19/2024 in [facility] with Kofi Garrison MD   DVT Score (IF A SCORE IS NOT CALCULATING, MUST SELECT A BMI TO COMPLETE) 11 filed at  04/15/2025 0859 5 filed at 04/19/2024 0930   Surgical Factors Major surgery planned, lasting 2-3 hours, Elective major lower extremity arthroplasty filed at 04/15/2025 0859 --   BMI (BMI MUST BE CHOSEN) 30 or less filed at 04/15/2025 0859 30 or less filed at 04/19/2024 0930   RETIRED: Current Status -- Major surgery planned, including arthroscopic and laproscopic (1-2 hours) filed at 04/19/2024 0930   RETIRED: Age -- 60-75 years filed at 04/19/2024 0930          Modified Frailty Index    No data to display       [id number] Stroke Risk Points  Current as of just now        N/A 0 to 9 Points:      Last Change: N/A          The [id number] risk score (Lip TAIWO, et al. 2009. © 2010 [employer]) quantifies the risk of stroke for a patient with atrial fibrillation. For patients without atrial fibrillation or under the age of 18 this score appears as N/A. Higher score values generally indicate higher risk of stroke.        This score is not applicable to this patient. Components are not calculated.          Revised Cardiac Risk Index      Flowsheet Row Pre-Admission Testing from 4/15/2025 in [facility] Pre-Admission Testing from 4/15/2024 in [facility]   High-Risk Surgery (Intraperitoneal, Intrathoracic,Suprainguinal vascular) 0 filed at 04/15/2025 0906 0 filed at 04/15/2024 0857   History of ischemic heart disease (History of MI, History of positive exercuse test, Current chest paint considered due to myocardial ischemia, Use of nitrate therapy, ECG with pathological Q Waves) 0 filed at 04/15/2025 0906 0 filed at 04/15/2024 0857   History of congestive heart failure (pulmonary edemia, bilateral rales or S3 gallop, Paroxysmal nocturnal dyspnea, CXR showing pulmonary vascular redistribution) 0 filed at 04/15/2025 0906 0 filed at 04/15/2024 0857   History of cerebrovascular disease (Prior TIA or stroke) 0 filed at 04/15/2025 0906 0 filed at 04/15/2024 0857    Pre-operative insulin treatment 0 filed at 04/15/2025 0906 0 filed at 04/15/2024 0857   Pre-operative creatinine>2 mg/dl 0 filed at 04/15/2025 0906 0 filed at 04/15/2024 0857   Revised Cardiac Risk Calculator 0 filed at 04/15/2025 0906 0 filed at 04/15/2024 0857          Apfel Simplified Score      Flowsheet Row Pre-Admission Testing from 4/15/2025 in [facility]   Smoking status 1 filed at 04/15/2025 0907   History of motion sickness or PONV  1 filed at 04/15/2025 0907   Use of postoperative opioids 0 filed at 04/15/2025 0907   Gender - Female 0=No filed at 04/15/2025 0907   Apfel Simplified Score Calculator 2 filed at 04/15/2025 0907          Risk Analysis Index Results This Encounter    No data found in the last 10 encounters.       Stop Bang Score      Flowsheet Row Pre-Admission Testing from 4/15/2025 in [facility] Admission (Discharged) from 4/19/2024 in [address] with Kofi Garrison MD   Do you snore loudly? 0 filed at 04/15/2025 0839 0 filed at 04/19/2024 0606   Do you often feel tired or fatigued after your sleep? 0 filed at 04/15/2025 0839 0 filed at 04/19/2024 0606   Has anyone ever observed you stop breathing in your sleep? 0 filed at 04/15/2025 0839 1 filed at 04/19/2024 0606   Do you have or are you being treated for high blood pressure? 1 filed at 04/15/2025 0839 1 filed at 04/19/2024 0606   Recent BMI (Calculated) 25.8 filed at 04/15/2025 0839 30 filed at 04/19/2024 0606   Is BMI greater than 35 kg/m2? 0=No filed at 04/15/2025 0839 0=No filed at 04/19/2024 0606   Age older than 50 years old? 1=Yes filed at 04/15/2025 0839 1=Yes filed at 04/19/2024 0606   Is your neck circumference greater than 17 inches (Male) or 16 inches (Female)? 0 filed at 04/15/2025 0839 0 filed at 04/19/2024 0606   Gender - Male 1=Yes filed at 04/15/2025 0839 1=Yes filed at 04/19/2024 0606   STOP-BANG Total Score 3 filed at 04/15/2025 0839 4 filed at 04/19/2024 0606           Prodigy: High Risk  Total Score: 16              Prodigy Age Score      Prodigy Gender Score          ARISCAT Score for Postoperative Pulmonary Complications      Flowsheet Row Pre-Admission Testing from 4/15/2025 in [facility]   Age Calculated Score 3 filed at 04/15/2025 0907   Preoperative SpO2 0 filed at 04/15/2025 0907   Respiratory infection in the last month Either upper or lower (i.e., URI, bronchitis, pneumonia), with fever and antibiotic treatment 0 filed at 04/15/2025 0907   Preoperative anemia (Hgb less than 10 g/dl) 0 filed at 04/15/2025 0907   Surgical incision  0 filed at 04/15/2025 0907   Duration of surgery  16 filed at 04/15/2025 0907   Emergency Procedure  0 filed at 04/15/2025 0907   ARISCAT Total Score  19 filed at 04/15/2025 0907          Issac Perioperative Risk for Myocardial Infarction or Cardiac Arrest (LILLY)      Flowsheet Row Pre-Admission Testing from 4/15/2025 in [facility]   Calculated Age Score 1.28 filed at 04/15/2025 0909   Functional Status  0 filed at 04/15/2025 0909   ASA Class  -3.29 filed at 04/15/2025 0909   Creatinine 0 filed at 04/15/2025 0909   Type of Procedure  0.80 filed at 04/15/2025 0909   LILLY Total Score  -6.46 filed at 04/15/2025 0909   LILLY % 0.16 filed at 04/15/2025 0909                Assessment and Plan:     HPI 64 y/o male scheduled for total knee arthroplasty revision-left on 4/29/2025 with  Dr. Pruitt secondary to acute pain of left knee.  PMHX includes HTM, T2DM.  PAT is consulted today for perioperative risk stratification and optimization.    Neuro:  No neurologic diagnosis or significant findings on chart review, clinical presentation and evaluation.  No grossly apparent neurologic perioperative risk.    Patient is at increased risk for perioperative CVA secondary to HTN    HEENT:  No HEENT diagnosis or significant findings on chart review or clinical presentation and evaluation. No further preoperative testing/intervention  indicated at this time.    Cardiovascular:  HTN - Managed by PCP   Hold lisinopril day of procedure  METS: 9  RCRI: 0 points, 3.9%  risk for postoperative MACE       Pulmonary:  No pulmonary diagnosis, however patient is at increased risk of perioperative complications secondary to  age > 60  Stop Bang score is 3 placing patient at intermediate risk for ROLA  PRODIGY: Moderate risk for opioid induced respiratory depression  Pumonary education discussed, patient also provided deep breathing exerciseswith educational handout    Renal:   No renal diagnosis, however patient is at increase risk for perioperative renal complications secondary to Age equal to or greater than 56, HTN, diabetes      Endocrine:  T2DM - Managed by PCP  Hold metformin day of surgery      Hematologic:  No hematologic diagnosis, however patient is at an increased risk for DVT  Caprini Score 11, patient at High risk for perioperative DVT.  Patient provided with VTE education/handout.    Gastrointestinal:   No GI diagnosis or significant findings on chart review or clinical presentation and evaluation.   Apfel 2    Orthopedic Surgery  Seen by Dr. Pruitt on 2/6/2025 for acute pain in left knee  Plan for left total knee arthroplasty revision    Infectious disease:   No infectious diagnosis or significant findings on chart review or clinical presentation and evaluation.   Prescription provided for CHG body wash and dental rinse. CHG use instructions reviewed and provided to patient.  Staph screen collected    Musculoskeletal:   No diagnosis or significant findings on chart review or clinical presentation and evaluation.     Anesthesia/Airway:  No anesthesia complications      Medication instructions and NPO guidelines reviewed with the patient.  All questions or concerns discussed and addressed.      Labs ordered

## 2025-04-15 NOTE — H&P (VIEW-ONLY)
CPM/PAT Evaluation       Name: Doug Encinas (Doug Encinas)  /Age: 1960/65 y.o.     Visit Type:   In-Person       Chief Complaint: Acute pain of left knee    HPI 66 y/o male scheduled for total knee arthroplasty revision-left on 2025 with  Dr. Pruitt secondary to acute pain of left knee.  PMHX includes HTM, T2DM.  PAT is consulted today for perioperative risk stratification and optimization.      Past Medical History:   Diagnosis Date    BPH (benign prostatic hyperplasia)     Diabetes mellitus (Multi)     GERD (gastroesophageal reflux disease)     Hyperlipidemia     Hypertension     Nephrolithiasis        Past Surgical History:   Procedure Laterality Date    CERVICAL SPINE SURGERY  2024    hardware    COLONOSCOPY      KNEE ARTHROPLASTY Left     (partial)    TOE SURGERY Bilateral     Great toe arthroplasty       Patient Sexual activity questions deferred to the physician.    Family History   Problem Relation Name Age of Onset    Diabetes type I Mother      Heart disease Mother      Heart disease Father      Hypertension Father      Hypertension Sister      Coronary artery disease Brother         No Known Allergies    Prior to Admission medications    Medication Sig Start Date End Date Taking? Authorizing Provider   atorvastatin (Lipitor) 80 mg tablet TAKE 1 TABLET DAILY 24   Aba Horne MD   ibuprofen 600 mg tablet Take 1 tablet (600 mg) by mouth 4 times a day as needed for mild pain (1 - 3) (pain). 24  Aba Horne MD   lisinopril 20 mg tablet Take 1 tablet (20 mg) by mouth once daily. 24   Aba Horne MD   metFORMIN XR (Glucophage-XR) 750 mg 24 hr tablet TAKE 1 TABLET TWICE A DAY 3/7/25   Aba Horne MD   omeprazole (PriLOSEC) 20 mg DR capsule TAKE 1 CAPSULE DAILY 24   Aba Horne MD   tamsulosin (Flomax) 0.4 mg 24 hr capsule Take 1 capsule (0.4 mg) by mouth once daily. 24   Aba Horne MD   tirzepatide (Mounjaro)  "5 mg/0.5 mL pen injector Inject 5 mg under the skin 1 (one) time per week. 5/5/24 4/30/25  Aba Horne MD        [facility] ROS:   Constitutional:   neg    Neuro/Psych:   neg    Eyes:    use of corrective lenses  Ears:    hearing aides  Nose:   Mouth:   Throat:   Neck:   neg    Cardio:   neg    Respiratory:   neg    Endocrine:   GI:   neg    :   Musculoskeletal:    Pain in left knee  Hematologic:   neg    Skin:      Physical Exam  Vitals reviewed.   Constitutional:       Appearance: Normal appearance.   HENT:      Mouth/Throat:      Mouth: Mucous membranes are moist.      Pharynx: Oropharynx is clear.   Eyes:      Pupils: Pupils are equal, round, and reactive to light.   Cardiovascular:      Rate and Rhythm: Normal rate and regular rhythm.      Pulses: Normal pulses.      Heart sounds: Normal heart sounds.   Pulmonary:      Effort: Pulmonary effort is normal.      Breath sounds: Normal breath sounds.   Abdominal:      General: Bowel sounds are normal.      Palpations: Abdomen is soft.   Musculoskeletal:         General: Normal range of motion.      Cervical back: Normal range of motion and neck supple.   Skin:     General: Skin is warm and dry.   Neurological:      General: No focal deficit present.      Mental Status: He is alert and oriented to person, place, and time.   Psychiatric:         Mood and Affect: Mood normal.         Behavior: Behavior normal.          Airway    Testing/Diagnostic:     Patient Specialist/PCP:     Visit Vitals  /71   Pulse 75   Temp 36.3 °C (97.3 °F) (Temporal)   Resp 18   Ht 1.651 m (5' 5\")   Wt 77.3 kg (170 lb 6.7 oz)   SpO2 96%   BMI 28.36 kg/m²   Smoking Status Former   BSA 1.88 m²       DASI Risk Score      Flowsheet Row Pre-Admission Testing from 4/15/2025 in [facility] Pre-Admission Testing from 4/15/2024 in [facility]   Can you take care of yourself (eat, dress, bathe, or use toilet)?  2.75 filed at 04/15/2025 0837 2.75 filed at 04/15/2024 " 0840   Can you walk indoors, such as around your house? 1.75 filed at 04/15/2025 0837 1.75 filed at 04/15/2024 0840   Can you walk a block or two on level ground?  2.75 filed at 04/15/2025 0837 2.75 filed at 04/15/2024 0840   Can you climb a flight of stairs or walk up a hill? 5.5 filed at 04/15/2025 0837 5.5 filed at 04/15/2024 0840   Can you run a short distance? 8 filed at 04/15/2025 0837 8 filed at 04/15/2024 0840   Can you do light work around the house like dusting or washing dishes? 2.7 filed at 04/15/2025 0837 2.7 filed at 04/15/2024 0840   Can you do moderate work around the house like vacuuming, sweeping floors or carrying groceries? 3.5 filed at 04/15/2025 0837 3.5 filed at 04/15/2024 0840   Can you do heavy work around the house like scrubbing floors or lifting and moving heavy furniture?  8 filed at 04/15/2025 0837 8 filed at 04/15/2024 0840   Can you do yard work like raking leaves, weeding or pushing a mower? 4.5 filed at 04/15/2025 0837 4.5 filed at 04/15/2024 0840   Can you have sexual relations? 5.25 filed at 04/15/2025 0837 5.25 filed at 04/15/2024 0840   Can you participate in moderate recreational activities like golf, bowling, dancing, doubles tennis or throwing a baseball or football? 6 filed at 04/15/2025 0837 6 filed at 04/15/2024 0840   Can you participate in strenous sports like swimming, singles tennis, football, basketball, or skiing? 0 filed at 04/15/2025 0837 7.5 filed at 04/15/2024 0840   DASI SCORE 50.7 filed at 04/15/2025 0837 58.2 filed at 04/15/2024 0840   METS Score (Will be calculated only when all the questions are answered) 9 filed at 04/15/2025 0837 9.9 filed at 04/15/2024 0840          Caprini DVT Assessment      Flowsheet Row Pre-Admission Testing from 4/15/2025 in [facility] Admission (Discharged) from 4/19/2024 in [facility] with Kofi Garrison MD   DVT Score (IF A SCORE IS NOT CALCULATING, MUST SELECT A BMI TO COMPLETE) 11 filed at  04/15/2025 0859 5 filed at 04/19/2024 0930   Surgical Factors Major surgery planned, lasting 2-3 hours, Elective major lower extremity arthroplasty filed at 04/15/2025 0859 --   BMI (BMI MUST BE CHOSEN) 30 or less filed at 04/15/2025 0859 30 or less filed at 04/19/2024 0930   RETIRED: Current Status -- Major surgery planned, including arthroscopic and laproscopic (1-2 hours) filed at 04/19/2024 0930   RETIRED: Age -- 60-75 years filed at 04/19/2024 0930          Modified Frailty Index    No data to display       [id number] Stroke Risk Points  Current as of just now        N/A 0 to 9 Points:      Last Change: N/A          The [id number]-[id number] risk score (Lip TAIWO, et al. 2009. © 2010 [employer]) quantifies the risk of stroke for a patient with atrial fibrillation. For patients without atrial fibrillation or under the age of 18 this score appears as N/A. Higher score values generally indicate higher risk of stroke.        This score is not applicable to this patient. Components are not calculated.          Revised Cardiac Risk Index      Flowsheet Row Pre-Admission Testing from 4/15/2025 in [facility] Pre-Admission Testing from 4/15/2024 in [facility]   High-Risk Surgery (Intraperitoneal, Intrathoracic,Suprainguinal vascular) 0 filed at 04/15/2025 0906 0 filed at 04/15/2024 0857   History of ischemic heart disease (History of MI, History of positive exercuse test, Current chest paint considered due to myocardial ischemia, Use of nitrate therapy, ECG with pathological Q Waves) 0 filed at 04/15/2025 0906 0 filed at 04/15/2024 0857   History of congestive heart failure (pulmonary edemia, bilateral rales or S3 gallop, Paroxysmal nocturnal dyspnea, CXR showing pulmonary vascular redistribution) 0 filed at 04/15/2025 0906 0 filed at 04/15/2024 0857   History of cerebrovascular disease (Prior TIA or stroke) 0 filed at 04/15/2025 0906 0 filed at 04/15/2024 0857    Pre-operative insulin treatment 0 filed at 04/15/2025 0906 0 filed at 04/15/2024 0857   Pre-operative creatinine>2 mg/dl 0 filed at 04/15/2025 0906 0 filed at 04/15/2024 0857   Revised Cardiac Risk Calculator 0 filed at 04/15/2025 0906 0 filed at 04/15/2024 0857          Apfel Simplified Score      Flowsheet Row Pre-Admission Testing from 4/15/2025 in [facility] [place]   Smoking status 1 filed at 04/15/2025 0907   History of motion sickness or PONV  1 filed at 04/15/2025 0907   Use of postoperative opioids 0 filed at 04/15/2025 0907   Gender - Female 0=No filed at 04/15/2025 0907   Apfel Simplified Score Calculator 2 filed at 04/15/2025 0907          Risk Analysis Index Results This Encounter    No data found in the last 10 encounters.       Stop Bang Score      Flowsheet Row Pre-Admission Testing from 4/15/2025 in [facility] [place] Admission (Discharged) from 4/19/2024 in [address] with Kofi Garrison MD   Do you snore loudly? 0 filed at 04/15/2025 0839 0 filed at 04/19/2024 0606   Do you often feel tired or fatigued after your sleep? 0 filed at 04/15/2025 0839 0 filed at 04/19/2024 0606   Has anyone ever observed you stop breathing in your sleep? 0 filed at 04/15/2025 0839 1 filed at 04/19/2024 0606   Do you have or are you being treated for high blood pressure? 1 filed at 04/15/2025 0839 1 filed at 04/19/2024 0606   Recent BMI (Calculated) 25.8 filed at 04/15/2025 0839 30 filed at 04/19/2024 0606   Is BMI greater than 35 kg/m2? 0=No filed at 04/15/2025 0839 0=No filed at 04/19/2024 0606   Age older than 50 years old? 1=Yes filed at 04/15/2025 0839 1=Yes filed at 04/19/2024 0606   Is your neck circumference greater than 17 inches (Male) or 16 inches (Female)? 0 filed at 04/15/2025 0839 0 filed at 04/19/2024 0606   Gender - Male 1=Yes filed at 04/15/2025 0839 1=Yes filed at 04/19/2024 0606   STOP-BANG Total Score 3 filed at 04/15/2025 0839 4 filed at 04/19/2024 0606           Prodigy: High Risk  Total Score: 16              Prodigy Age Score      Prodigy Gender Score          ARISCAT Score for Postoperative Pulmonary Complications      Flowsheet Row Pre-Admission Testing from 4/15/2025 in [facility]   Age Calculated Score 3 filed at 04/15/2025 0907   Preoperative SpO2 0 filed at 04/15/2025 0907   Respiratory infection in the last month Either upper or lower (i.e., URI, bronchitis, pneumonia), with fever and antibiotic treatment 0 filed at 04/15/2025 0907   Preoperative anemia (Hgb less than 10 g/dl) 0 filed at 04/15/2025 0907   Surgical incision  0 filed at 04/15/2025 0907   Duration of surgery  16 filed at 04/15/2025 0907   Emergency Procedure  0 filed at 04/15/2025 0907   ARISCAT Total Score  19 filed at 04/15/2025 0907          Issac Perioperative Risk for Myocardial Infarction or Cardiac Arrest (LILLY)      Flowsheet Row Pre-Admission Testing from 4/15/2025 in [facility]   Calculated Age Score 1.28 filed at 04/15/2025 0909   Functional Status  0 filed at 04/15/2025 0909   ASA Class  -3.29 filed at 04/15/2025 0909   Creatinine 0 filed at 04/15/2025 0909   Type of Procedure  0.80 filed at 04/15/2025 0909   LILLY Total Score  -6.46 filed at 04/15/2025 0909   LILLY % 0.16 filed at 04/15/2025 0909                Assessment and Plan:     HPI 64 y/o male scheduled for total knee arthroplasty revision-left on 4/29/2025 with  Dr. Pruitt secondary to acute pain of left knee.  PMHX includes HTM, T2DM.  PAT is consulted today for perioperative risk stratification and optimization.    Neuro:  No neurologic diagnosis or significant findings on chart review, clinical presentation and evaluation.  No grossly apparent neurologic perioperative risk.    Patient is at increased risk for perioperative CVA secondary to HTN    HEENT:  No HEENT diagnosis or significant findings on chart review or clinical presentation and evaluation. No further preoperative testing/intervention  indicated at this time.    Cardiovascular:  HTN - Managed by PCP   Hold lisinopril day of procedure  METS: 9  RCRI: 0 points, 3.9%  risk for postoperative MACE       Pulmonary:  No pulmonary diagnosis, however patient is at increased risk of perioperative complications secondary to  age > 60  Stop Bang score is 3 placing patient at intermediate risk for ROLA  PRODIGY: Moderate risk for opioid induced respiratory depression  Pumonary education discussed, patient also provided deep breathing exerciseswith educational handout    Renal:   No renal diagnosis, however patient is at increase risk for perioperative renal complications secondary to Age equal to or greater than 56, HTN, diabetes      Endocrine:  T2DM - Managed by PCP  Hold metformin day of surgery      Hematologic:  No hematologic diagnosis, however patient is at an increased risk for DVT  Caprini Score 11, patient at High risk for perioperative DVT.  Patient provided with VTE education/handout.    Gastrointestinal:   No GI diagnosis or significant findings on chart review or clinical presentation and evaluation.   Apfel 2    Orthopedic Surgery  Seen by Dr. Pruitt on 2/6/2025 for acute pain in left knee  Plan for left total knee arthroplasty revision    Infectious disease:   No infectious diagnosis or significant findings on chart review or clinical presentation and evaluation.   Prescription provided for CHG body wash and dental rinse. CHG use instructions reviewed and provided to patient.  Staph screen collected    Musculoskeletal:   No diagnosis or significant findings on chart review or clinical presentation and evaluation.     Anesthesia/Airway:  No anesthesia complications      Medication instructions and NPO guidelines reviewed with the patient.  All questions or concerns discussed and addressed.      Labs ordered

## 2025-04-17 ENCOUNTER — OFFICE VISIT (OUTPATIENT)
Dept: PRIMARY CARE | Facility: CLINIC | Age: 65
End: 2025-04-17
Payer: COMMERCIAL

## 2025-04-17 VITALS
BODY MASS INDEX: 27.99 KG/M2 | DIASTOLIC BLOOD PRESSURE: 70 MMHG | SYSTOLIC BLOOD PRESSURE: 130 MMHG | WEIGHT: 168 LBS | OXYGEN SATURATION: 94 % | HEIGHT: 65 IN | HEART RATE: 79 BPM | TEMPERATURE: 97.6 F

## 2025-04-17 DIAGNOSIS — R19.7 DIARRHEA OF PRESUMED INFECTIOUS ORIGIN: Primary | ICD-10-CM

## 2025-04-17 LAB — STAPHYLOCOCCUS SPEC CULT: NORMAL

## 2025-04-17 PROCEDURE — 4010F ACE/ARB THERAPY RXD/TAKEN: CPT | Performed by: FAMILY MEDICINE

## 2025-04-17 PROCEDURE — 3008F BODY MASS INDEX DOCD: CPT | Performed by: FAMILY MEDICINE

## 2025-04-17 PROCEDURE — 99213 OFFICE O/P EST LOW 20 MIN: CPT | Performed by: FAMILY MEDICINE

## 2025-04-17 PROCEDURE — 1036F TOBACCO NON-USER: CPT | Performed by: FAMILY MEDICINE

## 2025-04-17 PROCEDURE — 3078F DIAST BP <80 MM HG: CPT | Performed by: FAMILY MEDICINE

## 2025-04-17 PROCEDURE — 3075F SYST BP GE 130 - 139MM HG: CPT | Performed by: FAMILY MEDICINE

## 2025-04-17 ASSESSMENT — PATIENT HEALTH QUESTIONNAIRE - PHQ9
1. LITTLE INTEREST OR PLEASURE IN DOING THINGS: NOT AT ALL
SUM OF ALL RESPONSES TO PHQ9 QUESTIONS 1 AND 2: 0
2. FEELING DOWN, DEPRESSED OR HOPELESS: NOT AT ALL

## 2025-04-17 ASSESSMENT — PAIN SCALES - GENERAL: PAINLEVEL_OUTOF10: 4

## 2025-04-17 NOTE — PROGRESS NOTES
"Subjective   Patient ID: Doug Encinas is a 65 y.o. male who presents for Diarrhea (Started four days ago, very watery brownish in color.   The first day he also had vomiting but that has resolved.  Decreased appetite, some nausea.//Having surgery in 4/29/2025 for knee replacement).    HPI here with a 5-day history of painless diarrhea.  It began with nausea and vomiting for the first half day or so and then now has just become brown water diarrhea.  His appetite is decreased.  Patient will be having a knee replacement done on 4/29/2025.  He does not have a fever or abdominal pain.  He does not recall any sick contacts.    Review of Systems  Constitutional: Patient is negative for fever, fatigue, weight change.  HEENT: Patient is negative for change in vision, hearing, swallow.  Cardio: Patient is negative for chest pain, lower extremity edema.  Pulmonary: Patient is negative for cough, shortness of breath.  Gastrointestinal: Patient is positive for diarrhea with mild nausea    Objective   /70 (BP Location: Left arm, Patient Position: Sitting)   Pulse 79   Temp 36.4 °C (97.6 °F)   Ht 1.651 m (5' 5\")   Wt 76.2 kg (168 lb)   SpO2 94%   BMI 27.96 kg/m²     Physical Exam  General: Awake and alert no apparent distress.  HEENT: Moist mucosa no cervical lymphadenopathy.  Cardio: Heart S1-S2 no murmur rub or gallop.  Pulmonary: Lungs clear to auscultation bilaterally.  Assessment/Plan   Problem List Items Addressed This Visit    None  Visit Diagnoses         Codes      Diarrhea of presumed infectious origin    -  Primary needs workup.  Patient usually would write out what appears to be an acute viral gastroenteritis.  However due to the fact that he is having scheduled knee replacement done in approximately 12 days, will obtain stool cultures at this time. R19.7    Relevant Orders    C. difficile, PCR    Ova/Para + Giardia/Cryptosporidium Antigen    Stool Pathogen Panel, PCR               "

## 2025-04-17 NOTE — PREADMISSION SCREENING NOTE
Pre Surgery Discharge Planning :    Procedure: r knee revision      Date of procedure: 4/29/2025      Address you will be discharged to:   [id number]   [place] [address]     Care Partner:  Laurel (wife)    Current mobility status: no mobility aids used      Stairs into house: 2    Stairs inside house: 0    DME: walker, crutches, cane      Joint Class: February 2025      Same Day Surgery: NO

## 2025-04-23 ENCOUNTER — ANESTHESIA EVENT (OUTPATIENT)
Dept: OPERATING ROOM | Facility: HOSPITAL | Age: 65
End: 2025-04-23
Payer: COMMERCIAL

## 2025-04-28 ENCOUNTER — HOME HEALTH ADMISSION (OUTPATIENT)
Dept: HOME HEALTH SERVICES | Facility: HOME HEALTH | Age: 65
End: 2025-04-28
Payer: COMMERCIAL

## 2025-04-28 PROCEDURE — RXMED WILLOW AMBULATORY MEDICATION CHARGE

## 2025-04-28 RX ORDER — OXYCODONE AND ACETAMINOPHEN 5; 325 MG/1; MG/1
1 TABLET ORAL EVERY 4 HOURS PRN
Qty: 36 TABLET | Refills: 0 | Status: SHIPPED | OUTPATIENT
Start: 2025-04-28 | End: 2025-04-30 | Stop reason: HOSPADM

## 2025-04-28 RX ORDER — NAPROXEN SODIUM 220 MG/1
81 TABLET, FILM COATED ORAL 2 TIMES DAILY
Qty: 56 TABLET | Refills: 0 | Status: SHIPPED | OUTPATIENT
Start: 2025-04-28 | End: 2025-05-28

## 2025-04-28 RX ORDER — CEFADROXIL 500 MG/1
500 CAPSULE ORAL 2 TIMES DAILY
Qty: 14 CAPSULE | Refills: 0 | Status: SHIPPED | OUTPATIENT
Start: 2025-04-28 | End: 2025-05-07

## 2025-04-28 RX ORDER — POLYETHYLENE GLYCOL 3350 17 G/17G
17 POWDER, FOR SOLUTION ORAL DAILY
Qty: 238 G | Refills: 0 | Status: SHIPPED | OUTPATIENT
Start: 2025-04-28

## 2025-04-28 RX ORDER — DOCUSATE SODIUM 100 MG/1
100 CAPSULE, LIQUID FILLED ORAL 2 TIMES DAILY
Qty: 20 CAPSULE | Refills: 0 | Status: SHIPPED | OUTPATIENT
Start: 2025-04-28 | End: 2025-05-10

## 2025-04-28 NOTE — DISCHARGE INSTRUCTIONS
TOTAL HIP AND KNEE REPLACEMENT DISCHARGE INSTRUCTIONS  Florencio Pruitt MD      DRIVING & TRAVEL AFTER SURGERY   Patients should anticipate waiting at least 4-6 weeks before traveling long distances after surgery.  You will need to stop to walk around ever 1 hour during your travel to help with blood clot prevention.  Please call the office or your joint nurse to discuss prior to post-surgical travel.  Patients may not drive until cleared by the joint nurse or the office.    DENTAL PROCEDURES & CLEANINGS  You must wait a minimum of 3 months for elective dental appointments, including routine cleanings or dental work including bridges, crowns, extractions, etc..  For any dental visit - cleaning or dental procedures - patients must take an antibiotic 1 hour before the appointment.  Antibiotics are a lifelong need before dental appointments.  The antibiotic prescribed will be based on each patient's allergies.    WOUND CARE  If you experience continued drainage or bleeding, you may cover with abdominal pads (purchase at local drug store).  Knee replacements should wrap with an ace wrap.  Do not remove surgical dressing, it will be removed when you arrive in clinic for follow up visit.   Mesh dressing under the bandage will remain in place until it peels off on its own.  If it is loose, you may gently remove.  Do not remove if pulling causes resistance against the incision.      PAIN, SWELLING, BRUISING & CLICKING  Pain and swelling are a natural part of your recovery which is considered normal fur up to a year after surgery.  Symptoms may be treated with movement, ice, compression stockings, elevating your leg, and by following the pain medication regimen as prescribed.  Bruising is normal for several weeks after surgery and will run down the leg over time.  You may ice areas that are tender to help with discomfort.  You are required to wear the provided compression stockings, every day, for 4 weeks following surgery.   Remove the stockings at night and place them back on in the morning.  Pain and swelling may temporarily increase with an increase in activity or exercise.  Use ice after activity.  Audible clicking with movement or exercises is considered normal following joint replacement.  You may also feel decreased sensation or numbness near the incision site.  These are usually normal and may or may not fade over time.     PERSONAL HYGIENE  You may shower upon discharging from the hospital.  Soap and water is permitted to run over the surgical dressing, steri-strips and incision.  Do not scrub directly over these items.  DO NOT soak your incision in a bath, hot tub, pool or pond/lake for a minimum of 8 weeks following your surgery.  DO NOT use lotions, creams, ointments on your wound for a minimum of 6 weeks following your surgery. At that time you may use vitamin E to assist with softening of your incision.      RESTARTING HOME ROUTINE - DIET & MEDICATIONS  Post-operative constipation can result due to a combination of inactivity, anesthesia and pain medication. To help prevent this, you should increase your water and fiber intake. Physical activity such as walking will also help stimulate the bowels.   You may resume your normal diet when you discharge home.  Choose foods that help promote good bowel habits and prevent constipation, such as foods high in fiber.  You may restart your home medications the following day after your surgery UNLESS you have been given alternate instructions.  Follow the instructions given to you on your hospital discharge instructions for more information regarding your home medications.      IN-HOME PHYSICAL THERAPY & OUTPATIENT PHYSICAL THERAPY  In-home physical therapy will start 1-2 days after you get home from the hospital.    The home care agency will call within the first 24-48 hours to set up their first visit.  Please do not call your care team to inquire during this timeframe.  Continue  the exercises you were given in the hospital until you have been seen by in-home therapy.  Make sure to provide a phone number with the ability for the home care staff to leave a message if you do not answer your phone.    Outpatient physical therapy following knee replacement surgery should begin 2-3 weeks after surgery.  You should call to schedule this appointment ASAP if not already scheduled before surgery.  Waiting until you are ready for outpatient physical therapy will cause a delay in your care.  You may choose any outpatient physical therapy location.  Call the office for an order if needed.    EMERGENCIES - WHEN TO CONTACT THE SURGEON'S OFFICE IMMEDIATELY  Fever >101 with chills that has been present for at least 48 hours.   Excessive bleeding from incision that will not slow down. A small amount of drainage is normal and expected.  Once pressure is applied and the area is covered, do not continue to check the area regularly.  This will remove pressure and bleeding will continue.  Leave in place for 4-6 hours.  Signs of infection of incision-excessive drainage that is soaking through your dressing (especially if it is pus-like), redness that is spreading out from the edges of your incision, or increased warmth around the area.  Excruciating pain for which the pain medication, taken as instructed, is not helping.  Severe calf pain.  Go directly to the emergency room or call 911, if you are experiencing chest pain or difficulty breathing.    ICE & COLD THERAPY INSTRUCTIONS    To assist with pain control and post-op swelling, you should be using ice regularly throughout recovery, especially for the first 6 weeks, regardless of the cold therapy method you use.      Always make sure there is a layer of protection between the cold pad and your skin.    If you are using ICE PACKS or GEL PACKS, you will need to alternate 20 minutes on, 20 minutes off twice per hour.    If you are using an ICE MACHINE, please  follow the provided ice machine instructions.  These devices differ from ice or ice packs whereas the mechanism circulates water through tubing and a pad to provide longer periods of cold therapy to the desired site.  You can use your cold devices around the clock for optimal comfort.  We recommend using cold therapy after working with therapy or completing exercises on your own.  There is no set schedule in which you must follow while using cold therapy.  Below are a few points to remember when using a cold therapy device:    You do not need to need to use the 20 on, 20 off method.  Detach the pad from the cooler and ambulate at least once every hour.  You can check your skin under the pad at this time.  You may wear the cold therapy device during periods of sleep including overnight.  If you wake up during the night, you can check the skin at this time.  You do not need to wake up specifically to perform skin checks.  Empty the cooler and pad when device is not in use.  Follow 's instructions for cleaning your cold therapy device.      DISCHARGE MEDICATIONS    PAIN MEDICATION    __X__ Oxycodone-acetaminophen  Oxycodone-acetaminophen has been prescribed for post-operative pain control.    These medications may only be refilled if neededONCE every 7 days for a period of up to 6 weeks following surgery.  After 6 weeks, you will transition to acetaminophen and over -the- counter anti-inflammatories such as Ibuprofen, Advil or Aleve in conjunction with ICE/COLD THERAPY.   Side effects may be constipation and nausea, vomiting, sleepiness, dizziness, lightheadedness, headache, blurred vision, dry mouth sweating, itching (if you have itching, over-the -counter Benadryl can be used as needed).  You may NOT operate a motor vehicle while taking these medications or have been cleared by your care team.    Please call the office for a refill request.  The office staff and orthopedic nurses cannot refill  medications; messages should be left directly through the office.  Please do not call multiple times or call other members of the care team for medication needs, this will cause the refill to take longer.    Per State and Institutional policy, pain medications can only be refilled every 7 days for up to six weeks following surgery.   .    ___X____ Naproxen has been prescribed as an adjunct anti-inflammatory to assist in pain control.    Take one 500 mg tablet twice a day for 4 weeks.  You will not receive refills on this medication.   Side effects may include nausea.  May not be prescribed if you are on a more potent blood thinner than aspirin or have chronic kidney disease.    BLOOD THINNER    __X__ Blood Thinner   A blood thinner has been prescribed to prevent blood clots in your leg or lungs. Take as prescribed on the bottle for 4 weeks. You will not receive a refill on this medication.      STOOL SOFTENERS    __X__ Colace (Docusate Sodium)   Take this medication to help with constipation while using the Oxycodone for pain control.  You will not receive a refill on this medication.  If you have not had a bowel movement for 2 days after surgery if you feel painful constipation, you may try over-the-counter Miralax or a suppository. If you still are not able to have a bowel movement, call our office or discuss with your PCP. If you have preexisting constipation or other GI issues that may predispose you to constipation, please consult with your GI provider or PCP if you have not had a bowel movement for 2 days after surgery.     Antibiotics    __X__ Cefadroxil or doxycycline  May be prescribed if needed for prophylaxis against infection   Take 7 day course of antibiotics until they are all gone  May not be prescribed if you do not meet criteria for elevated infection risk after surgery          You will not receive refills on the following medications:    Naproxen  Colace  Blood Thinner

## 2025-04-29 ENCOUNTER — APPOINTMENT (OUTPATIENT)
Dept: RADIOLOGY | Facility: HOSPITAL | Age: 65
End: 2025-04-29
Payer: COMMERCIAL

## 2025-04-29 ENCOUNTER — HOSPITAL ENCOUNTER (OUTPATIENT)
Facility: HOSPITAL | Age: 65
Setting detail: OBSERVATION
Discharge: HOME HEALTH | End: 2025-04-30
Attending: ORTHOPAEDIC SURGERY | Admitting: ORTHOPAEDIC SURGERY
Payer: COMMERCIAL

## 2025-04-29 ENCOUNTER — ANESTHESIA (OUTPATIENT)
Dept: OPERATING ROOM | Facility: HOSPITAL | Age: 65
End: 2025-04-29
Payer: COMMERCIAL

## 2025-04-29 ENCOUNTER — DOCUMENTATION (OUTPATIENT)
Dept: HOME HEALTH SERVICES | Facility: HOME HEALTH | Age: 65
End: 2025-04-29

## 2025-04-29 DIAGNOSIS — M25.562 ACUTE PAIN OF LEFT KNEE: ICD-10-CM

## 2025-04-29 DIAGNOSIS — M79.641 RIGHT HAND PAIN: ICD-10-CM

## 2025-04-29 DIAGNOSIS — Z96.652 S/P REVISION OF TOTAL KNEE, LEFT: Primary | ICD-10-CM

## 2025-04-29 LAB
ABO GROUP (TYPE) IN BLOOD: NORMAL
GLUCOSE BLD MANUAL STRIP-MCNC: 103 MG/DL (ref 74–99)
GLUCOSE BLD MANUAL STRIP-MCNC: 173 MG/DL (ref 74–99)
GLUCOSE BLD MANUAL STRIP-MCNC: 284 MG/DL (ref 74–99)
RH FACTOR (ANTIGEN D): NORMAL

## 2025-04-29 PROCEDURE — 2500000004 HC RX 250 GENERAL PHARMACY W/ HCPCS (ALT 636 FOR OP/ED): Mod: JZ | Performed by: ORTHOPAEDIC SURGERY

## 2025-04-29 PROCEDURE — 3700000001 HC GENERAL ANESTHESIA TIME - INITIAL BASE CHARGE: Performed by: ORTHOPAEDIC SURGERY

## 2025-04-29 PROCEDURE — 36415 COLL VENOUS BLD VENIPUNCTURE: CPT | Performed by: ORTHOPAEDIC SURGERY

## 2025-04-29 PROCEDURE — 2500000005 HC RX 250 GENERAL PHARMACY W/O HCPCS: Performed by: NURSE PRACTITIONER

## 2025-04-29 PROCEDURE — 96360 HYDRATION IV INFUSION INIT: CPT

## 2025-04-29 PROCEDURE — 2500000004 HC RX 250 GENERAL PHARMACY W/ HCPCS (ALT 636 FOR OP/ED): Mod: JZ | Performed by: NURSE PRACTITIONER

## 2025-04-29 PROCEDURE — 96361 HYDRATE IV INFUSION ADD-ON: CPT

## 2025-04-29 PROCEDURE — 2500000004 HC RX 250 GENERAL PHARMACY W/ HCPCS (ALT 636 FOR OP/ED): Mod: JZ | Performed by: ANESTHESIOLOGY

## 2025-04-29 PROCEDURE — 7100000001 HC RECOVERY ROOM TIME - INITIAL BASE CHARGE: Performed by: ORTHOPAEDIC SURGERY

## 2025-04-29 PROCEDURE — 3600000005 HC OR TIME - INITIAL BASE CHARGE - PROCEDURE LEVEL FIVE: Performed by: ORTHOPAEDIC SURGERY

## 2025-04-29 PROCEDURE — 2500000004 HC RX 250 GENERAL PHARMACY W/ HCPCS (ALT 636 FOR OP/ED): Performed by: ANESTHESIOLOGY

## 2025-04-29 PROCEDURE — 2500000001 HC RX 250 WO HCPCS SELF ADMINISTERED DRUGS (ALT 637 FOR MEDICARE OP): Performed by: NURSE PRACTITIONER

## 2025-04-29 PROCEDURE — 88311 DECALCIFY TISSUE: CPT | Performed by: STUDENT IN AN ORGANIZED HEALTH CARE EDUCATION/TRAINING PROGRAM

## 2025-04-29 PROCEDURE — G0378 HOSPITAL OBSERVATION PER HR: HCPCS

## 2025-04-29 PROCEDURE — 2780000003 HC OR 278 NO HCPCS: Performed by: ORTHOPAEDIC SURGERY

## 2025-04-29 PROCEDURE — 99222 1ST HOSP IP/OBS MODERATE 55: CPT | Performed by: NURSE PRACTITIONER

## 2025-04-29 PROCEDURE — 3700000002 HC GENERAL ANESTHESIA TIME - EACH INCREMENTAL 1 MINUTE: Performed by: ORTHOPAEDIC SURGERY

## 2025-04-29 PROCEDURE — 87070 CULTURE OTHR SPECIMN AEROBIC: CPT | Mod: GEALAB | Performed by: ORTHOPAEDIC SURGERY

## 2025-04-29 PROCEDURE — 96365 THER/PROPH/DIAG IV INF INIT: CPT

## 2025-04-29 PROCEDURE — 94760 N-INVAS EAR/PLS OXIMETRY 1: CPT

## 2025-04-29 PROCEDURE — 2500000001 HC RX 250 WO HCPCS SELF ADMINISTERED DRUGS (ALT 637 FOR MEDICARE OP): Performed by: ORTHOPAEDIC SURGERY

## 2025-04-29 PROCEDURE — A6213 FOAM DRG >16<=48 SQ IN W/BDR: HCPCS | Performed by: ORTHOPAEDIC SURGERY

## 2025-04-29 PROCEDURE — 73560 X-RAY EXAM OF KNEE 1 OR 2: CPT | Mod: LT

## 2025-04-29 PROCEDURE — 73560 X-RAY EXAM OF KNEE 1 OR 2: CPT | Mod: LEFT SIDE | Performed by: RADIOLOGY

## 2025-04-29 PROCEDURE — 3600000010 HC OR TIME - EACH INCREMENTAL 1 MINUTE - PROCEDURE LEVEL FIVE: Performed by: ORTHOPAEDIC SURGERY

## 2025-04-29 PROCEDURE — 7100000002 HC RECOVERY ROOM TIME - EACH INCREMENTAL 1 MINUTE: Performed by: ORTHOPAEDIC SURGERY

## 2025-04-29 PROCEDURE — 2720000007 HC OR 272 NO HCPCS: Performed by: ORTHOPAEDIC SURGERY

## 2025-04-29 PROCEDURE — 88304 TISSUE EXAM BY PATHOLOGIST: CPT | Mod: TC,GEALAB | Performed by: ORTHOPAEDIC SURGERY

## 2025-04-29 PROCEDURE — C1776 JOINT DEVICE (IMPLANTABLE): HCPCS | Performed by: ORTHOPAEDIC SURGERY

## 2025-04-29 PROCEDURE — 87205 SMEAR GRAM STAIN: CPT | Mod: GEALAB | Performed by: ORTHOPAEDIC SURGERY

## 2025-04-29 PROCEDURE — A9999 DME SUPPLY OR ACCESSORY, NOS: HCPCS | Performed by: ORTHOPAEDIC SURGERY

## 2025-04-29 PROCEDURE — 2500000005 HC RX 250 GENERAL PHARMACY W/O HCPCS: Performed by: NURSE ANESTHETIST, CERTIFIED REGISTERED

## 2025-04-29 PROCEDURE — 1100000001 HC PRIVATE ROOM DAILY

## 2025-04-29 PROCEDURE — 82947 ASSAY GLUCOSE BLOOD QUANT: CPT

## 2025-04-29 PROCEDURE — C1713 ANCHOR/SCREW BN/BN,TIS/BN: HCPCS | Performed by: ORTHOPAEDIC SURGERY

## 2025-04-29 PROCEDURE — 2500000005 HC RX 250 GENERAL PHARMACY W/O HCPCS: Performed by: ORTHOPAEDIC SURGERY

## 2025-04-29 PROCEDURE — 88304 TISSUE EXAM BY PATHOLOGIST: CPT | Performed by: STUDENT IN AN ORGANIZED HEALTH CARE EDUCATION/TRAINING PROGRAM

## 2025-04-29 PROCEDURE — 2500000004 HC RX 250 GENERAL PHARMACY W/ HCPCS (ALT 636 FOR OP/ED): Performed by: NURSE ANESTHETIST, CERTIFIED REGISTERED

## 2025-04-29 PROCEDURE — 27447 TOTAL KNEE ARTHROPLASTY: CPT | Performed by: ORTHOPAEDIC SURGERY

## 2025-04-29 PROCEDURE — 9420000001 HC RT PATIENT EDUCATION 5 MIN

## 2025-04-29 DEVICE — ATTUNE KNEE SYSTEM REVISION CEMENTED STEM CEMENTED 14X30MM
Type: IMPLANTABLE DEVICE | Site: KNEE | Status: FUNCTIONAL
Brand: ATTUNE

## 2025-04-29 DEVICE — ATTUNE KNEE SYSTEM REVISION ROTATING PLATFORM TIBIAL BASE CEMENTED SIZE 6
Type: IMPLANTABLE DEVICE | Site: KNEE | Status: FUNCTIONAL
Brand: ATTUNE

## 2025-04-29 DEVICE — TOBRA FULL DOSE ANTIBIOTIC BONE CEMENT, 10 PACK CATALOG NUMBER IS 6197-9-010
Type: IMPLANTABLE DEVICE | Site: KNEE | Status: FUNCTIONAL
Brand: SIMPLEX

## 2025-04-29 DEVICE — ATTUNE KNEE SYSTEM FEMORAL POSTERIOR STABILIZED SIZE 6 LEFT CEMENTED
Type: IMPLANTABLE DEVICE | Site: KNEE | Status: FUNCTIONAL
Brand: ATTUNE

## 2025-04-29 DEVICE — ATTUNE KNEE SYSTEM TIBIAL INSERT ROTATING PLATFORM POSTERIOR STABILIZED 6 7MM AOX
Type: IMPLANTABLE DEVICE | Site: KNEE | Status: FUNCTIONAL
Brand: ATTUNE

## 2025-04-29 RX ORDER — POLYETHYLENE GLYCOL 3350 17 G/17G
17 POWDER, FOR SOLUTION ORAL DAILY
Status: DISCONTINUED | OUTPATIENT
Start: 2025-04-29 | End: 2025-04-30 | Stop reason: HOSPADM

## 2025-04-29 RX ORDER — DIPHENHYDRAMINE HYDROCHLORIDE 50 MG/ML
12.5 INJECTION, SOLUTION INTRAMUSCULAR; INTRAVENOUS ONCE AS NEEDED
Status: DISCONTINUED | OUTPATIENT
Start: 2025-04-29 | End: 2025-04-29 | Stop reason: HOSPADM

## 2025-04-29 RX ORDER — ATORVASTATIN CALCIUM 80 MG/1
80 TABLET, FILM COATED ORAL NIGHTLY
Status: DISCONTINUED | OUTPATIENT
Start: 2025-04-29 | End: 2025-04-30 | Stop reason: HOSPADM

## 2025-04-29 RX ORDER — ASPIRIN 81 MG/1
81 TABLET ORAL 2 TIMES DAILY
Status: DISCONTINUED | OUTPATIENT
Start: 2025-04-30 | End: 2025-04-30 | Stop reason: HOSPADM

## 2025-04-29 RX ORDER — NALOXONE HYDROCHLORIDE 0.4 MG/ML
0.2 INJECTION, SOLUTION INTRAMUSCULAR; INTRAVENOUS; SUBCUTANEOUS EVERY 5 MIN PRN
Status: DISCONTINUED | OUTPATIENT
Start: 2025-04-29 | End: 2025-04-30 | Stop reason: HOSPADM

## 2025-04-29 RX ORDER — ACETAMINOPHEN 325 MG/1
975 TABLET ORAL ONCE
Status: COMPLETED | OUTPATIENT
Start: 2025-04-29 | End: 2025-04-29

## 2025-04-29 RX ORDER — ONDANSETRON 4 MG/1
4 TABLET, FILM COATED ORAL EVERY 8 HOURS PRN
Status: DISCONTINUED | OUTPATIENT
Start: 2025-04-29 | End: 2025-04-30 | Stop reason: HOSPADM

## 2025-04-29 RX ORDER — ONDANSETRON HYDROCHLORIDE 2 MG/ML
INJECTION, SOLUTION INTRAVENOUS AS NEEDED
Status: DISCONTINUED | OUTPATIENT
Start: 2025-04-29 | End: 2025-04-29

## 2025-04-29 RX ORDER — SODIUM CHLORIDE, SODIUM LACTATE, POTASSIUM CHLORIDE, CALCIUM CHLORIDE 600; 310; 30; 20 MG/100ML; MG/100ML; MG/100ML; MG/100ML
100 INJECTION, SOLUTION INTRAVENOUS CONTINUOUS
Status: DISCONTINUED | OUTPATIENT
Start: 2025-04-29 | End: 2025-04-30 | Stop reason: HOSPADM

## 2025-04-29 RX ORDER — KETOROLAC TROMETHAMINE 30 MG/ML
30 INJECTION, SOLUTION INTRAMUSCULAR; INTRAVENOUS ONCE
Status: COMPLETED | OUTPATIENT
Start: 2025-04-29 | End: 2025-04-29

## 2025-04-29 RX ORDER — HYDROMORPHONE HYDROCHLORIDE 2 MG/ML
INJECTION, SOLUTION INTRAMUSCULAR; INTRAVENOUS; SUBCUTANEOUS AS NEEDED
Status: DISCONTINUED | OUTPATIENT
Start: 2025-04-29 | End: 2025-04-29

## 2025-04-29 RX ORDER — ONDANSETRON HYDROCHLORIDE 2 MG/ML
4 INJECTION, SOLUTION INTRAVENOUS ONCE AS NEEDED
Status: DISCONTINUED | OUTPATIENT
Start: 2025-04-29 | End: 2025-04-29 | Stop reason: HOSPADM

## 2025-04-29 RX ORDER — TRANEXAMIC ACID 10 MG/ML
INJECTION, SOLUTION INTRAVENOUS AS NEEDED
Status: DISCONTINUED | OUTPATIENT
Start: 2025-04-29 | End: 2025-04-29

## 2025-04-29 RX ORDER — PANTOPRAZOLE SODIUM 40 MG/1
40 TABLET, DELAYED RELEASE ORAL
Status: DISCONTINUED | OUTPATIENT
Start: 2025-04-30 | End: 2025-04-30 | Stop reason: HOSPADM

## 2025-04-29 RX ORDER — ALBUTEROL SULFATE 0.83 MG/ML
2.5 SOLUTION RESPIRATORY (INHALATION) ONCE AS NEEDED
Status: DISCONTINUED | OUTPATIENT
Start: 2025-04-29 | End: 2025-04-29 | Stop reason: HOSPADM

## 2025-04-29 RX ORDER — MORPHINE SULFATE 2 MG/ML
2 INJECTION, SOLUTION INTRAMUSCULAR; INTRAVENOUS EVERY 4 HOURS PRN
Status: DISCONTINUED | OUTPATIENT
Start: 2025-04-29 | End: 2025-04-30 | Stop reason: HOSPADM

## 2025-04-29 RX ORDER — OXYCODONE HYDROCHLORIDE 5 MG/1
5 TABLET ORAL EVERY 4 HOURS PRN
Status: DISCONTINUED | OUTPATIENT
Start: 2025-04-29 | End: 2025-04-30 | Stop reason: HOSPADM

## 2025-04-29 RX ORDER — MEPERIDINE HYDROCHLORIDE 25 MG/ML
12.5 INJECTION INTRAMUSCULAR; INTRAVENOUS; SUBCUTANEOUS EVERY 10 MIN PRN
Status: DISCONTINUED | OUTPATIENT
Start: 2025-04-29 | End: 2025-04-29 | Stop reason: HOSPADM

## 2025-04-29 RX ORDER — FENTANYL CITRATE 50 UG/ML
INJECTION, SOLUTION INTRAMUSCULAR; INTRAVENOUS AS NEEDED
Status: DISCONTINUED | OUTPATIENT
Start: 2025-04-29 | End: 2025-04-29

## 2025-04-29 RX ORDER — DEXTROSE 50 % IN WATER (D50W) INTRAVENOUS SYRINGE
25
Status: DISCONTINUED | OUTPATIENT
Start: 2025-04-29 | End: 2025-04-30 | Stop reason: HOSPADM

## 2025-04-29 RX ORDER — SODIUM CHLORIDE, SODIUM LACTATE, POTASSIUM CHLORIDE, CALCIUM CHLORIDE 600; 310; 30; 20 MG/100ML; MG/100ML; MG/100ML; MG/100ML
20 INJECTION, SOLUTION INTRAVENOUS CONTINUOUS
Status: DISCONTINUED | OUTPATIENT
Start: 2025-04-29 | End: 2025-04-30

## 2025-04-29 RX ORDER — LIDOCAINE HCL/PF 100 MG/5ML
SYRINGE (ML) INTRAVENOUS AS NEEDED
Status: DISCONTINUED | OUTPATIENT
Start: 2025-04-29 | End: 2025-04-29

## 2025-04-29 RX ORDER — LISINOPRIL 20 MG/1
20 TABLET ORAL NIGHTLY
Status: DISCONTINUED | OUTPATIENT
Start: 2025-04-29 | End: 2025-04-30 | Stop reason: HOSPADM

## 2025-04-29 RX ORDER — OXYCODONE HYDROCHLORIDE 10 MG/1
10 TABLET ORAL EVERY 4 HOURS PRN
Status: DISCONTINUED | OUTPATIENT
Start: 2025-04-29 | End: 2025-04-30 | Stop reason: HOSPADM

## 2025-04-29 RX ORDER — PROPOFOL 10 MG/ML
INJECTION, EMULSION INTRAVENOUS AS NEEDED
Status: DISCONTINUED | OUTPATIENT
Start: 2025-04-29 | End: 2025-04-29

## 2025-04-29 RX ORDER — INSULIN LISPRO 100 [IU]/ML
0-5 INJECTION, SOLUTION INTRAVENOUS; SUBCUTANEOUS
Status: DISCONTINUED | OUTPATIENT
Start: 2025-04-29 | End: 2025-04-30 | Stop reason: HOSPADM

## 2025-04-29 RX ORDER — OXYCODONE HYDROCHLORIDE 5 MG/1
5 TABLET ORAL EVERY 4 HOURS PRN
Status: DISCONTINUED | OUTPATIENT
Start: 2025-04-29 | End: 2025-04-29 | Stop reason: HOSPADM

## 2025-04-29 RX ORDER — CEFAZOLIN SODIUM 2 G/100ML
2 INJECTION, SOLUTION INTRAVENOUS ONCE
Status: DISCONTINUED | OUTPATIENT
Start: 2025-04-29 | End: 2025-04-29 | Stop reason: HOSPADM

## 2025-04-29 RX ORDER — ONDANSETRON HYDROCHLORIDE 2 MG/ML
4 INJECTION, SOLUTION INTRAVENOUS EVERY 8 HOURS PRN
Status: DISCONTINUED | OUTPATIENT
Start: 2025-04-29 | End: 2025-04-30 | Stop reason: HOSPADM

## 2025-04-29 RX ORDER — DOCUSATE SODIUM 100 MG/1
100 CAPSULE, LIQUID FILLED ORAL 2 TIMES DAILY
Status: DISCONTINUED | OUTPATIENT
Start: 2025-04-29 | End: 2025-04-30 | Stop reason: HOSPADM

## 2025-04-29 RX ORDER — NORETHINDRONE AND ETHINYL ESTRADIOL 0.5-0.035
KIT ORAL AS NEEDED
Status: DISCONTINUED | OUTPATIENT
Start: 2025-04-29 | End: 2025-04-29

## 2025-04-29 RX ORDER — ACETAMINOPHEN 325 MG/1
650 TABLET ORAL EVERY 6 HOURS SCHEDULED
Status: DISCONTINUED | OUTPATIENT
Start: 2025-04-29 | End: 2025-04-30 | Stop reason: HOSPADM

## 2025-04-29 RX ORDER — SODIUM CHLORIDE, SODIUM LACTATE, POTASSIUM CHLORIDE, CALCIUM CHLORIDE 600; 310; 30; 20 MG/100ML; MG/100ML; MG/100ML; MG/100ML
100 INJECTION, SOLUTION INTRAVENOUS CONTINUOUS
Status: DISCONTINUED | OUTPATIENT
Start: 2025-04-29 | End: 2025-04-29 | Stop reason: HOSPADM

## 2025-04-29 RX ORDER — IBUPROFEN 600 MG/1
600 TABLET ORAL 4 TIMES DAILY PRN
Start: 2025-04-30

## 2025-04-29 RX ORDER — MIDAZOLAM HYDROCHLORIDE 1 MG/ML
INJECTION, SOLUTION INTRAMUSCULAR; INTRAVENOUS AS NEEDED
Status: DISCONTINUED | OUTPATIENT
Start: 2025-04-29 | End: 2025-04-29

## 2025-04-29 RX ORDER — ROCURONIUM BROMIDE 10 MG/ML
INJECTION, SOLUTION INTRAVENOUS AS NEEDED
Status: DISCONTINUED | OUTPATIENT
Start: 2025-04-29 | End: 2025-04-29

## 2025-04-29 RX ORDER — GABAPENTIN 300 MG/1
300 CAPSULE ORAL NIGHTLY
Status: DISCONTINUED | OUTPATIENT
Start: 2025-04-29 | End: 2025-04-30 | Stop reason: HOSPADM

## 2025-04-29 RX ORDER — DROPERIDOL 2.5 MG/ML
0.62 INJECTION, SOLUTION INTRAMUSCULAR; INTRAVENOUS ONCE AS NEEDED
Status: DISCONTINUED | OUTPATIENT
Start: 2025-04-29 | End: 2025-04-29 | Stop reason: HOSPADM

## 2025-04-29 RX ORDER — CEFAZOLIN SODIUM 2 G/50ML
2 SOLUTION INTRAVENOUS EVERY 6 HOURS
Status: COMPLETED | OUTPATIENT
Start: 2025-04-29 | End: 2025-04-30

## 2025-04-29 RX ORDER — CEFAZOLIN 1 G/1
INJECTION, POWDER, FOR SOLUTION INTRAVENOUS AS NEEDED
Status: DISCONTINUED | OUTPATIENT
Start: 2025-04-29 | End: 2025-04-29

## 2025-04-29 RX ORDER — CELECOXIB 400 MG/1
400 CAPSULE ORAL ONCE
Status: COMPLETED | OUTPATIENT
Start: 2025-04-29 | End: 2025-04-29

## 2025-04-29 RX ORDER — DEXTROSE 50 % IN WATER (D50W) INTRAVENOUS SYRINGE
12.5
Status: DISCONTINUED | OUTPATIENT
Start: 2025-04-29 | End: 2025-04-30 | Stop reason: HOSPADM

## 2025-04-29 RX ORDER — SODIUM CHLORIDE 0.9 G/100ML
INJECTION, SOLUTION IRRIGATION AS NEEDED
Status: DISCONTINUED | OUTPATIENT
Start: 2025-04-29 | End: 2025-04-29 | Stop reason: HOSPADM

## 2025-04-29 RX ADMIN — ROCURONIUM BROMIDE 15 MG: 10 INJECTION INTRAVENOUS at 12:53

## 2025-04-29 RX ADMIN — ROCURONIUM BROMIDE 10 MG: 10 INJECTION INTRAVENOUS at 14:03

## 2025-04-29 RX ADMIN — POVIDONE-IODINE 1 APPLICATION: 5 SOLUTION TOPICAL at 10:51

## 2025-04-29 RX ADMIN — ACETAMINOPHEN 650 MG: 325 TABLET, FILM COATED ORAL at 18:25

## 2025-04-29 RX ADMIN — ATORVASTATIN CALCIUM 80 MG: 80 TABLET, FILM COATED ORAL at 21:05

## 2025-04-29 RX ADMIN — FENTANYL CITRATE 100 MCG: 50 INJECTION, SOLUTION INTRAMUSCULAR; INTRAVENOUS at 12:25

## 2025-04-29 RX ADMIN — LIDOCAINE HYDROCHLORIDE 60 MG: 20 INJECTION INTRAVENOUS at 12:25

## 2025-04-29 RX ADMIN — ROCURONIUM BROMIDE 10 MG: 10 INJECTION INTRAVENOUS at 13:28

## 2025-04-29 RX ADMIN — CEFAZOLIN 2 G: 1 INJECTION, POWDER, FOR SOLUTION INTRAMUSCULAR; INTRAVENOUS at 12:25

## 2025-04-29 RX ADMIN — HYDROMORPHONE HYDROCHLORIDE 0.4 MG: 2 INJECTION, SOLUTION INTRAMUSCULAR; INTRAVENOUS; SUBCUTANEOUS at 15:02

## 2025-04-29 RX ADMIN — SODIUM CHLORIDE, SODIUM LACTATE, POTASSIUM CHLORIDE, AND CALCIUM CHLORIDE 100 ML/HR: .6; .31; .03; .02 INJECTION, SOLUTION INTRAVENOUS at 21:06

## 2025-04-29 RX ADMIN — EPHEDRINE SULFATE 5 MG: 50 INJECTION, SOLUTION INTRAVENOUS at 13:20

## 2025-04-29 RX ADMIN — CELECOXIB 400 MG: 400 CAPSULE ORAL at 10:39

## 2025-04-29 RX ADMIN — GABAPENTIN 300 MG: 300 CAPSULE ORAL at 21:05

## 2025-04-29 RX ADMIN — SUGAMMADEX 200 MG: 100 INJECTION, SOLUTION INTRAVENOUS at 14:53

## 2025-04-29 RX ADMIN — HYDROMORPHONE HYDROCHLORIDE 0.5 MG: 1 INJECTION, SOLUTION INTRAMUSCULAR; INTRAVENOUS; SUBCUTANEOUS at 15:25

## 2025-04-29 RX ADMIN — TRANEXAMIC ACID 1000 MG: 10 INJECTION, SOLUTION INTRAVENOUS at 12:30

## 2025-04-29 RX ADMIN — Medication 2 L/MIN: at 18:00

## 2025-04-29 RX ADMIN — ONDANSETRON 4 MG: 2 INJECTION, SOLUTION INTRAMUSCULAR; INTRAVENOUS at 14:30

## 2025-04-29 RX ADMIN — ACETAMINOPHEN 975 MG: 325 TABLET, FILM COATED ORAL at 10:37

## 2025-04-29 RX ADMIN — EPHEDRINE SULFATE 5 MG: 50 INJECTION, SOLUTION INTRAVENOUS at 13:02

## 2025-04-29 RX ADMIN — KETOROLAC TROMETHAMINE 30 MG: 30 INJECTION, SOLUTION INTRAMUSCULAR at 15:42

## 2025-04-29 RX ADMIN — HYDROMORPHONE HYDROCHLORIDE 0.4 MG: 2 INJECTION, SOLUTION INTRAMUSCULAR; INTRAVENOUS; SUBCUTANEOUS at 14:57

## 2025-04-29 RX ADMIN — TRANEXAMIC ACID 1000 MG: 10 INJECTION, SOLUTION INTRAVENOUS at 14:28

## 2025-04-29 RX ADMIN — CEFAZOLIN SODIUM 2 G: 2 SOLUTION INTRAVENOUS at 18:22

## 2025-04-29 RX ADMIN — SODIUM CHLORIDE, POTASSIUM CHLORIDE, SODIUM LACTATE AND CALCIUM CHLORIDE 20 ML/HR: 600; 310; 30; 20 INJECTION, SOLUTION INTRAVENOUS at 11:01

## 2025-04-29 RX ADMIN — MIDAZOLAM 2 MG: 1 INJECTION INTRAMUSCULAR; INTRAVENOUS at 12:00

## 2025-04-29 RX ADMIN — SODIUM CHLORIDE, POTASSIUM CHLORIDE, SODIUM LACTATE AND CALCIUM CHLORIDE: 600; 310; 30; 20 INJECTION, SOLUTION INTRAVENOUS at 14:07

## 2025-04-29 RX ADMIN — SODIUM CHLORIDE, POTASSIUM CHLORIDE, SODIUM LACTATE AND CALCIUM CHLORIDE: 600; 310; 30; 20 INJECTION, SOLUTION INTRAVENOUS at 12:19

## 2025-04-29 RX ADMIN — PROPOFOL 160 MG: 10 INJECTION, EMULSION INTRAVENOUS at 12:25

## 2025-04-29 RX ADMIN — DEXAMETHASONE SODIUM PHOSPHATE 8 MG: 4 INJECTION INTRA-ARTICULAR; INTRALESIONAL; INTRAMUSCULAR; INTRAVENOUS; SOFT TISSUE at 12:32

## 2025-04-29 RX ADMIN — ROCURONIUM BROMIDE 50 MG: 10 INJECTION INTRAVENOUS at 12:25

## 2025-04-29 RX ADMIN — HYDROMORPHONE HYDROCHLORIDE 0.6 MG: 2 INJECTION, SOLUTION INTRAMUSCULAR; INTRAVENOUS; SUBCUTANEOUS at 12:43

## 2025-04-29 SDOH — ECONOMIC STABILITY: FOOD INSECURITY: WITHIN THE PAST 12 MONTHS, YOU WORRIED THAT YOUR FOOD WOULD RUN OUT BEFORE YOU GOT THE MONEY TO BUY MORE.: NEVER TRUE

## 2025-04-29 SDOH — SOCIAL STABILITY: SOCIAL INSECURITY
WITHIN THE LAST YEAR, HAVE YOU BEEN RAPED OR FORCED TO HAVE ANY KIND OF SEXUAL ACTIVITY BY YOUR PARTNER OR EX-PARTNER?: NO

## 2025-04-29 SDOH — ECONOMIC STABILITY: HOUSING INSECURITY: IN THE PAST 12 MONTHS, HOW MANY TIMES HAVE YOU MOVED WHERE YOU WERE LIVING?: 0

## 2025-04-29 SDOH — SOCIAL STABILITY: SOCIAL INSECURITY: ABUSE: ADULT

## 2025-04-29 SDOH — SOCIAL STABILITY: SOCIAL INSECURITY: WERE YOU ABLE TO COMPLETE ALL THE BEHAVIORAL HEALTH SCREENINGS?: YES

## 2025-04-29 SDOH — ECONOMIC STABILITY: HOUSING INSECURITY: AT ANY TIME IN THE PAST 12 MONTHS, WERE YOU HOMELESS OR LIVING IN A SHELTER (INCLUDING NOW)?: NO

## 2025-04-29 SDOH — ECONOMIC STABILITY: INCOME INSECURITY: IN THE PAST 12 MONTHS HAS THE ELECTRIC, GAS, OIL, OR WATER COMPANY THREATENED TO SHUT OFF SERVICES IN YOUR HOME?: NO

## 2025-04-29 SDOH — HEALTH STABILITY: MENTAL HEALTH
DO YOU FEEL STRESS - TENSE, RESTLESS, NERVOUS, OR ANXIOUS, OR UNABLE TO SLEEP AT NIGHT BECAUSE YOUR MIND IS TROUBLED ALL THE TIME - THESE DAYS?: NOT AT ALL

## 2025-04-29 SDOH — SOCIAL STABILITY: SOCIAL INSECURITY: WITHIN THE LAST YEAR, HAVE YOU BEEN HUMILIATED OR EMOTIONALLY ABUSED IN OTHER WAYS BY YOUR PARTNER OR EX-PARTNER?: NO

## 2025-04-29 SDOH — ECONOMIC STABILITY: FOOD INSECURITY: HOW HARD IS IT FOR YOU TO PAY FOR THE VERY BASICS LIKE FOOD, HOUSING, MEDICAL CARE, AND HEATING?: NOT VERY HARD

## 2025-04-29 SDOH — SOCIAL STABILITY: SOCIAL INSECURITY: DOES ANYONE TRY TO KEEP YOU FROM HAVING/CONTACTING OTHER FRIENDS OR DOING THINGS OUTSIDE YOUR HOME?: NO

## 2025-04-29 SDOH — ECONOMIC STABILITY: HOUSING INSECURITY: IN THE LAST 12 MONTHS, WAS THERE A TIME WHEN YOU WERE NOT ABLE TO PAY THE MORTGAGE OR RENT ON TIME?: NO

## 2025-04-29 SDOH — SOCIAL STABILITY: SOCIAL INSECURITY: WITHIN THE LAST YEAR, HAVE YOU BEEN AFRAID OF YOUR PARTNER OR EX-PARTNER?: NO

## 2025-04-29 SDOH — SOCIAL STABILITY: SOCIAL INSECURITY: HAS ANYONE EVER THREATENED TO HURT YOUR FAMILY OR YOUR PETS?: NO

## 2025-04-29 SDOH — SOCIAL STABILITY: SOCIAL INSECURITY
WITHIN THE LAST YEAR, HAVE YOU BEEN KICKED, HIT, SLAPPED, OR OTHERWISE PHYSICALLY HURT BY YOUR PARTNER OR EX-PARTNER?: NO

## 2025-04-29 SDOH — ECONOMIC STABILITY: FOOD INSECURITY: WITHIN THE PAST 12 MONTHS, THE FOOD YOU BOUGHT JUST DIDN'T LAST AND YOU DIDN'T HAVE MONEY TO GET MORE.: NEVER TRUE

## 2025-04-29 SDOH — SOCIAL STABILITY: SOCIAL INSECURITY: DO YOU FEEL UNSAFE GOING BACK TO THE PLACE WHERE YOU ARE LIVING?: NO

## 2025-04-29 SDOH — HEALTH STABILITY: MENTAL HEALTH: CURRENT SMOKER: 0

## 2025-04-29 SDOH — SOCIAL STABILITY: SOCIAL INSECURITY: ARE THERE ANY APPARENT SIGNS OF INJURIES/BEHAVIORS THAT COULD BE RELATED TO ABUSE/NEGLECT?: NO

## 2025-04-29 SDOH — SOCIAL STABILITY: SOCIAL INSECURITY: HAVE YOU HAD THOUGHTS OF HARMING ANYONE ELSE?: NO

## 2025-04-29 SDOH — ECONOMIC STABILITY: TRANSPORTATION INSECURITY: IN THE PAST 12 MONTHS, HAS LACK OF TRANSPORTATION KEPT YOU FROM MEDICAL APPOINTMENTS OR FROM GETTING MEDICATIONS?: NO

## 2025-04-29 SDOH — SOCIAL STABILITY: SOCIAL INSECURITY: ARE YOU OR HAVE YOU BEEN THREATENED OR ABUSED PHYSICALLY, EMOTIONALLY, OR SEXUALLY BY ANYONE?: NO

## 2025-04-29 SDOH — SOCIAL STABILITY: SOCIAL INSECURITY: HAVE YOU HAD ANY THOUGHTS OF HARMING ANYONE ELSE?: NO

## 2025-04-29 SDOH — SOCIAL STABILITY: SOCIAL INSECURITY: DO YOU FEEL ANYONE HAS EXPLOITED OR TAKEN ADVANTAGE OF YOU FINANCIALLY OR OF YOUR PERSONAL PROPERTY?: NO

## 2025-04-29 ASSESSMENT — COGNITIVE AND FUNCTIONAL STATUS - GENERAL
WALKING IN HOSPITAL ROOM: A LITTLE
PATIENT BASELINE BEDBOUND: NO
DRESSING REGULAR LOWER BODY CLOTHING: A LITTLE
HELP NEEDED FOR BATHING: A LITTLE
MOBILITY SCORE: 20
DAILY ACTIVITIY SCORE: 24
CLIMB 3 TO 5 STEPS WITH RAILING: A LITTLE
DAILY ACTIVITIY SCORE: 22
MOBILITY SCORE: 24
STANDING UP FROM CHAIR USING ARMS: A LITTLE
MOVING TO AND FROM BED TO CHAIR: A LITTLE

## 2025-04-29 ASSESSMENT — PAIN SCALES - GENERAL
PAINLEVEL_OUTOF10: 8
PAINLEVEL_OUTOF10: 9
PAINLEVEL_OUTOF10: 8
PAIN_LEVEL: 2
PAINLEVEL_OUTOF10: 0 - NO PAIN
PAINLEVEL_OUTOF10: 9
PAINLEVEL_OUTOF10: 8
PAINLEVEL_OUTOF10: 4
PAINLEVEL_OUTOF10: 5 - MODERATE PAIN

## 2025-04-29 ASSESSMENT — ACTIVITIES OF DAILY LIVING (ADL)
ADEQUATE_TO_COMPLETE_ADL: YES
BATHING: INDEPENDENT
PATIENT'S MEMORY ADEQUATE TO SAFELY COMPLETE DAILY ACTIVITIES?: YES
LACK_OF_TRANSPORTATION: NO
WALKS IN HOME: INDEPENDENT
HEARING - LEFT EAR: FUNCTIONAL
GROOMING: INDEPENDENT
LACK_OF_TRANSPORTATION: NO
TOILETING: INDEPENDENT
DRESSING YOURSELF: INDEPENDENT
HEARING - RIGHT EAR: FUNCTIONAL
JUDGMENT_ADEQUATE_SAFELY_COMPLETE_DAILY_ACTIVITIES: YES
FEEDING YOURSELF: INDEPENDENT

## 2025-04-29 ASSESSMENT — PAIN DESCRIPTION - LOCATION
LOCATION: KNEE
LOCATION: KNEE

## 2025-04-29 ASSESSMENT — PAIN DESCRIPTION - DESCRIPTORS
DESCRIPTORS: THROBBING

## 2025-04-29 ASSESSMENT — PAIN - FUNCTIONAL ASSESSMENT
PAIN_FUNCTIONAL_ASSESSMENT: 0-10

## 2025-04-29 ASSESSMENT — LIFESTYLE VARIABLES
AUDIT-C TOTAL SCORE: 0
AUDIT-C TOTAL SCORE: 0
SKIP TO QUESTIONS 9-10: 1
HOW OFTEN DO YOU HAVE A DRINK CONTAINING ALCOHOL: NEVER
HOW MANY STANDARD DRINKS CONTAINING ALCOHOL DO YOU HAVE ON A TYPICAL DAY: PATIENT DOES NOT DRINK
HOW OFTEN DO YOU HAVE 6 OR MORE DRINKS ON ONE OCCASION: NEVER

## 2025-04-29 ASSESSMENT — COLUMBIA-SUICIDE SEVERITY RATING SCALE - C-SSRS
2. HAVE YOU ACTUALLY HAD ANY THOUGHTS OF KILLING YOURSELF?: NO
1. IN THE PAST MONTH, HAVE YOU WISHED YOU WERE DEAD OR WISHED YOU COULD GO TO SLEEP AND NOT WAKE UP?: NO
6. HAVE YOU EVER DONE ANYTHING, STARTED TO DO ANYTHING, OR PREPARED TO DO ANYTHING TO END YOUR LIFE?: NO

## 2025-04-29 ASSESSMENT — PAIN DESCRIPTION - ORIENTATION
ORIENTATION: LEFT
ORIENTATION: LEFT

## 2025-04-29 ASSESSMENT — PATIENT HEALTH QUESTIONNAIRE - PHQ9
1. LITTLE INTEREST OR PLEASURE IN DOING THINGS: NOT AT ALL
SUM OF ALL RESPONSES TO PHQ9 QUESTIONS 1 & 2: 0
2. FEELING DOWN, DEPRESSED OR HOPELESS: NOT AT ALL

## 2025-04-29 NOTE — ANESTHESIA POSTPROCEDURE EVALUATION
Patient: Doug Encinas    Procedure Summary       Date: 04/29/25 Room / Location: [facility] 03 / [facility]    Anesthesia Start: 1219 Anesthesia Stop: 1507    Procedure: REVISION, TOTAL ARTHROPLASTY, KNEE (Left: Knee) Diagnosis:       Acute pain of left knee      (Acute pain of left knee [M25.562])    Surgeons: Florencio Pruitt MD Responsible Provider: Joshua Crow MD    Anesthesia Type: regional, general ASA Status: 3            Anesthesia Type: regional, general    Vitals Value Taken Time   /82 04/29/25 15:35   Temp 37.3 °C (99.1 °F) 04/29/25 15:03   Pulse 91 04/29/25 15:35   Resp 14 04/29/25 15:35   SpO2 99 % 04/29/25 15:35       Anesthesia Post Evaluation    Patient location during evaluation: PACU  Patient participation: complete - patient participated  Level of consciousness: awake  Pain score: 2  Pain management: adequate  Multimodal analgesia pain management approach  Airway patency: patent  Two or more strategies used to mitigate risk of obstructive sleep apnea  Cardiovascular status: acceptable  Respiratory status: acceptable  Hydration status: acceptable  Postoperative Nausea and Vomiting: none        There were no known notable events for this encounter.

## 2025-04-29 NOTE — HH CARE COORDINATION
Home Care received a Referral for Physical Therapy. We have processed the referral for a Start of Care on 4/30/2025.     If you have any questions or concerns, please feel free to contact us at [phone]. Follow the prompts, enter your five digit zip code, and you will be directed to your care team on [facility].

## 2025-04-29 NOTE — CONSULTS
Inpatient consult to Medicine  Consult performed by: ALVA Badillo  Consult ordered by: ANITHA Taylor-CNP  Reason for consult: Medical Management          Reason For Consult  Medical Management    History Of Present Illness  Doug Encinas is a 65 y.o. male with a medical history of HTN, DM II, and OA who presented to [facility] for an elective left total knee arthroplasty revision by Dr. Pruitt. States had a partial TKA 12 years ago and has recently been experiencing increasing pain in his knee. Surgical course uneventful to this point. EBL 50ml. Consulted for medical management. Seen and examined in his room this evening. Just finished eating his dinner. Doing well. Pain adequately controlled at this time. On 4 liters oxygen but denies breathing difficulties, cough, chest pain.      Past Medical History  Diabetes Mellitus   GERD  Hypertension  Hyperlipidemia  Osteoarthritis  Nephrolithiasis  BPH    Surgical History  Colonoscopy  Partial Knee Arthroplasty  Anterior Cervical Discectomy Fusion  Great Toe Arthroplasty - Bilateral     Social History  He reports that he quit smoking about 10 years ago. His smoking use included cigarettes. He started smoking about 48 years ago. He has a 38 pack-year smoking history. He has never used smokeless tobacco. He reports that he does not currently use alcohol. He reports that he does not use drugs.    Family History  Family History[1]     Allergies  Patient has no known allergies.    Review of Systems  Constitutional: Denies fever, chills, fatigue, weight loss/gain  HEENT: Denies ear ache, sore throat, nasal drainage  Eyes: Denies blurred or double vision  Respiratory: Denies cough, shortness or breath, wheezing  Cardiovascular: Denies chest pain, palpitations, shortness of breath with exertion, edema  GI: Denies abdominal pain, nausea, vomiting, diarrhea, constipation, bloody stools  : Denies urinary burning, urgency, frequency, hematuria  Musculoskeletal: See  HPI  Endocrine: Denies cold/heat intolerance, excessive thirst, excessive hunger  Neuro: Denies dizziness, lightheadedness, seizures, headaches  Psychiatric: Denies anxiety, depression, self-harm  Skin: Denies wounds, rashes, lesions  Hematologic: Denies easy bruising, easy bleeding, clotting disorder      Physical Exam  Constitutional: A&O x 3; NAD; calm and cooperative  Eyes: EOM's intact  HEENT: Normocephalic, Atraumatic. Oral mucosa moist.   Neck: Supple. No JVD, lymphadenopathy.   Lungs: CTAB with fair air movement. Respirations even and unlabored on 4 liters supplemental oxygen.   Heart: RRR  Abdomen: Soft, non-tender, non-distended, +BS  MS/Extremities: NGO x 4 with LLE ACE Wrapped. Toes are warm/pink with intact sensation. No edema. Peripheral pulses intact bilaterally.   Neuro: A&O x3; no focal deficits; gross motor and sensation intact.   Skin: Warm and dry. No rashes or lesions  Psych: Normal affect.       Last Recorded Vitals  /78 (BP Location: Left arm, Patient Position: Sitting)   Pulse 90   Temp 37 °C (98.6 °F) (Temporal)   Resp 15   Wt 76 kg (167 lb 8.8 oz)   SpO2 100%     Relevant Results  Results for orders placed or performed during the hospital encounter of 04/29/25 (from the past 24 hours)   POCT GLUCOSE   Result Value Ref Range    POCT Glucose 103 (H) 74 - 99 mg/dL   VERIFY ABO/Rh Group Test   Result Value Ref Range    ABO TYPE B     Rh TYPE POS    POCT GLUCOSE   Result Value Ref Range    POCT Glucose 173 (H) 74 - 99 mg/dL      Scheduled medications  Scheduled Medications[2]  Continuous medications  Continuous Medications[3]  PRN medications  PRN Medications[4]      Assessment/Plan   65 y.o. male with a medical history of HTN, DM II, and OA who presented to [facility] for an elective left total knee arthroplasty revision by Dr. Pruitt. Consulted for medical management.     CV: HTN  -Lisinopril resumed  -Will monitor BP closely in perioperative state    Left Knee Osteoarthritis  -S/P left  TKA revision today by Dr. Pruitt.   -Incisional care, antibiotics, activity restrictions per orthopedics surgery.  -PT/OT to follow. WBAT.   -Medicate for pain  -Maintain bowel regimen  -Advised IS use, mobilization.   -Monitor H&H for ABLA. EBL 50ml. CBC in AM.   -DVT prophylaxis per surgery: ASA 81mg BID    Diabetes Mellitus Type II  -Hold oral agents at this time  -On Insulin Sliding Scale  -Maintain hypoglycemic protocol    Hyperlipidemia  -On Statin therapy    GERD  -On PPI     Disposition  -Plan of care discussed with medicine, Dr. Ware.   -Discharge per orthopedics.   -> 60 minutes spent in coordination of care, including physical examination, review of chart, and discussion with pertinent staff.      Thank you for the consult. Please call/message via secure chat with medical questions.     ANITHA Badillo-CNP             [1]   Family History  Problem Relation Name Age of Onset    Diabetes type I Mother      Heart disease Mother      Heart disease Father      Hypertension Father      Hypertension Sister      Coronary artery disease Brother     [2] acetaminophen, 650 mg, oral, q6h ADRIANA  [START ON 4/30/2025] aspirin, 81 mg, oral, BID  atorvastatin, 80 mg, oral, Nightly  ceFAZolin, 2 g, intravenous, q6h  docusate sodium, 100 mg, oral, BID  gabapentin, 300 mg, oral, Nightly  insulin lispro, 0-5 Units, subcutaneous, TID AC  lisinopril, 20 mg, oral, Nightly  [START ON 4/30/2025] pantoprazole, 40 mg, oral, Daily before breakfast  polyethylene glycol, 17 g, oral, Daily  [3] lactated Ringer's, 20 mL/hr, Last Rate: Stopped (04/29/25 1501)  lactated Ringer's, 100 mL/hr, Last Rate: 100 mL/hr (04/29/25 1800)  oxygen, 2 L/min  [4] PRN medications: dextrose, dextrose, glucagon, glucagon, morphine, naloxone, ondansetron **OR** ondansetron, oxyCODONE, oxyCODONE

## 2025-04-29 NOTE — OP NOTE
REVISION, TOTAL ARTHROPLASTY, KNEE (L) Operative Note     Date: 2025  OR Location: [facility] OR    Name: Doug Encinas, : 1960, Age: 65 y.o., MRN: [id number], Sex: male    Diagnosis  Pre-op Diagnosis      * Acute pain of left knee [M25.562] Post-op Diagnosis     * Acute pain of left knee [M25.562]     Procedures  REVISION, TOTAL ARTHROPLASTY, KNEE  69038 - NM REVJ TOT KNEE ARTHRP FEM&ENTIRE TIBIAL COMPONE      Surgeons      * Florencio Pruitt - Primary    Resident/Fellow/Other Assistant:  Surgeons and Role:  * No surgeons found with a matching role *    Staff:   Circulator: Mendy  Scrub Person: Prince  Surgical Assistant: Gabriel    Anesthesia Staff: Anesthesiologist: Joshua Corw MD  CRNA: Sara Orellana, APRN-CRNA; ANITHA Clemente-CRNA    Procedure Summary  Anesthesia: Regional, Spinal  ASA: III  Estimated Blood Loss: 50mL  Intra-op Medications:   Administrations occurring from 1135 to 1505 on 25:   Medication Name Total Dose   EPINEPHrine (Adrenalin) 0.2 mL, ketorolac (Toradol) 30 mg, morphine PF (Duramorph) 1 mg/mL 5 mg, ropivacaine (Naropin) 5 mg/mL (0.5 %) 30 mL in sodium chloride 0.9% 20 mL syringe 57 mL   sodium chloride 0.9 % irrigation solution 3,000 mL   ceFAZolin (Ancef) vial 1 g 2 g   dexAMETHasone (Decadron) injection 4 mg/mL 8 mg   ePHEDrine injection 10 mg   fentaNYL (Sublimaze) injection 50 mcg/mL 100 mcg   HYDROmorphone (Dilaudid) injection 2 mg/mL 0.6 mg   lactated Ringer's infusion Cannot be calculated   lidocaine (cardiac) injection 2% prefilled syringe 60 mg   midazolam (Versed) injection 1 mg/mL 2 mg   ondansetron 2 mg/mL 4 mg   propofol (Diprivan) injection 10 mg/mL 160 mg   rocuronium (ZeMuron) 50 mg/5 mL injection 85 mg   tranexamic acid IV 1,000 mg in 100 mL NaCl (iso) - premix 2,000 mg              Anesthesia Record               Intraprocedure I/O Totals          Intake    Tranexamic Acid 0.00 mL    The total shown is the total volume documented since Anesthesia  Start was filed.    lactated Ringer's infusion 1000.00 mL    Total Intake 1000 mL       Output    Est. Blood Loss 50 mL    Total Output 50 mL       Net    Net Volume 950 mL          Specimen:   ID Type Source Tests Collected by Time   1 : LEFT KNEE CYST Tissue KNEE ARTHROPLASTY LEFT SURGICAL PATHOLOGY EXAM Florencio Pruitt MD 2025 1335   2 : LEFT KNEE REVISION BONE CONTENTS Tissue KNEE CONTENTS REAMINGS LEFT SURGICAL PATHOLOGY EXAM Florencio Pruitt MD 2025 1436   A : LEFT KNEE REVISION #1 Swab KNEE ARTHROPLASTY LEFT TISSUE/WOUND CULTURE/SMEAR Florencio Pruitt MD 2025 1254   B : LEFT KNEE REVISION #2 Swab KNEE ARTHROPLASTY LEFT TISSUE/WOUND CULTURE/SMEAR Florencio Pruitt MD 2025 1255   C : LEFT KNEE REVISION #3 Swab KNEE ARTHROPLASTY LEFT TISSUE/WOUND CULTURE/SMEAR Florencio Pruitt MD 2025 1255                 Drains and/or Catheters:   Closed/Suction Drain Anterior Neck Bulb 10 Fr. (Active)       Tourniquet Times:   * Missing tourniquet times found for documented tourniquets in lo *     Implants:  Implants       Type Name Action Serial No.      Implant CEMENT, BONE, TOBRAMYCIN, FULL DOSE - [id number] Implanted      Joint Knee BASE, TIBIAL, ATUNE REV RP, SIZE 6, CEMENTED - [id number] Implanted      Joint Knee STEM, ATTUNE REVISION CEMENTED, 14 X 30MM - [id number] Implanted      Joint Knee FEMORAL, ATTUNE PS, LAUREN, SZ 6, LT - [id number] Implanted      Joint Knee INSERT, ATTUNE PS RP, SZ 6, 7MM - [id number] Implanted               Findings: Large subchondral cyst in the central tibial metaphysis.  No evidence of infection    Indications: Doug Encinas is an 65 y.o. male who is having surgery for Acute pain of left knee [M25.562].  Cystic change in the tibia after unicompartmental knee arthroplasty    The patient was seen in the preoperative area. The risks, benefits, complications, treatment options, non-operative alternatives, expected recovery and outcomes were discussed  with the patient. The possibilities of reaction to medication, pulmonary aspiration, injury to surrounding structures, bleeding, recurrent infection, the need for additional procedures, failure to diagnose a condition, and creating a complication requiring transfusion or operation were discussed with the patient. The patient concurred with the proposed plan, giving informed consent.  The site of surgery was properly noted/marked if necessary per policy. The patient has been actively warmed in preoperative area. Preoperative antibiotics have been ordered and given within 1 hours of incision. Venous thrombosis prophylaxis have been ordered including bilateral sequential compression devices and chemical prophylaxis    Surgical assistants, as listed above, including residents if listed, were involved in the procedure. They are trained and qualified to assist in the procedure. Prior to the procedure they assisted with patient positioning, setup, and surgical skin preparation. During the procedure, they actively assisted Dr. Pruitt in completing the operation safely and expeditiously by helping to provide exposure, retract tissues, maintain hemostasis, closure, and any other necessary technical tasks.     Procedure Details: Statement medical necessity: This is a 65-year-old male who had a remote lateral unicompartmental knee arthroplasty.  He had increasing pain and was found to have extensive subchondral cystic change in the tibial metaphysis.  This had failed nonsurgical management.  Patient elected to proceed with revision and conversion to total knee arthroplasty, the risk benefits and alternatives were discussed with him and he signed informed consent    Justification for 22 modifier: This is a conversion of a previous unicompartmental knee arthroplasty to a total knee arthroplasty.  This required significant additional time and effort beyond the work required for a total knee arthroplasty.  There was significant  additional time and effort required for the reconstruction itself, further removal of components, for the exposure as well as the bony resections.  Because of all this additional work and effort 22 modifier is justified.    Description of procedure: Patient was brought the operating room placed operating table supine position all bony prominences well-padded appropriate preoperative antibiotics were given anesthesia was induced by anesthesia team patient was prepped and draped in usual sterile fashion a timeout was performed patient identified by name and medical record number and laterality and site of surgery confirmed by all present    We have a making a standard longitudinal anterior knee incision, we avoided the patient's previous incision as it was very far lateral from the lateral arthrotomy.  It would have led to inadequate exposure of the joint.  Performed a medial parapatellar arthrotomy and along the rectus snip, this was necessary in order to gain adequate exposure of the lateral aspect of the joint to remove the patient's current implants.  We then proceeded with comprehensive exposure to the knee for total knee arthroplasty including resection of the synovium in the suprapatellar pouch.  We able to gain excellent exposure.  We examined the previous unicompartmental knee arthroplasty and it was noted to be well-fixed.  There did appear to be contact between the prominent tibial spines and the medial aspect of the lateral femoral condyle, this may have been the source of the patient's pain in addition to the cystic change.  There was no sign of infection, cultures were taken.  Used intramedullary alignment to place a distal femoral guide 5 degrees valgus cut to the intramedullary canal of the femur.  We first drilled the pins then removed the jig without making the cut in order to remove the femoral component of the unicompartmental knee arthroplasty.  We used regular osteotomes followed by flexible  osteotomes to separate the bone cement interface and were able to remove the component with out significant bone loss.  We then made our distal femoral cut.  We placed the alignment jig for the femoral rotation and sizing, taking care to take into account the missing bone on the lateral side.  We used bony landmarks of Whitesides line and the transepicondylar axis to align the pins and sized the femur.  We then finished the remainder of the femur with the 4-in-1 block except a posterior stabilized prosthesis.  We then turned our attention to the tibia.  We made the tibial cut with 3 degree slope and aligned it for a neutral 90 degree tibial cut.  We took an appropriate depth of cut in order to achieve minimal resection and get close to the lower level of the tibial component.  We initially made the cut and remove the bone that was on the medial side.  We then were able to visualize the tibial component.  Remove the polyethylene and then used flexible osteotomes to separate the bone and interface with the cement.  We were then able to remove the tibial component without significant bone loss.  At this point we examined the cyst which had opened when we cut the tibia.  There were no aggressive features to the cyst and no significant soft tissue component.  We resected some of the lining of the cyst and sent it for pathology.  We noted that in its position it could likely be filled with the keel of the revision tibial component plus some cement surrounding that and would not require any additional metaphyseal reconstruction.  We checked our gaps and found that we had equal and rectangle or gaps in flexion and extension.  We decided that based on that we could proceed with using a regular PS component and this would not require additional constraint.  We prepped the tibia for a cemented stem that would bypass the area of the cyst, this necessitated using a revision component.  We then placed the tibial and femoral  trials as well as a tibial polyethylene insert trial.  We took this knee through range of motion and found that we had excellent stability throughout range of motion, it was stable to varus and valgus stress at 0 degrees and 90 degrees and had excellent patellar tracking.     We examined the patella and noted that it would be appropriate for nonprosthetic patellar resurfacing.  We performed circumferential electrocautery and synovectomy as well as circumferential resection of osteophytes.     Once were satisfied completely with the kinematics we removed the trial components we irrigated with normal saline we assembled the components and then cemented the final components into place.  We pressurized the cement and then removed any excess cement.  Once it was complete the hardware removed any excess cement I placed our final tibial articular component.  We irrigated extensively with normal saline as well as with dilute Betadine solution which was instilled for greater than 3 minutes.  We closed the rectus snip in the medial parapatellar arthrotomy with #5 Ethibond followed by #1 Vicryl and a #2 running strata fix.  Closed the subcu with 2-0 Vicryl the skin with 3 oh STRATAFIX and Prineo mesh and glue dressing.  Patient was woken from anesthesia by the anesthesia team brought to the PACU in stable condition    Postoperative plan: Patient will bear weight as tolerated antibiotics DVT prophylaxis other standard postoperative care.      Complications:  None; patient tolerated the procedure well.    Disposition: PACU - hemodynamically stable.  Condition: stable         Additional Details: none    Attending Attestation: I was present and scrubbed for the key portions of the procedure.    Florencio Pruitt  Phone Number: [phone]

## 2025-04-29 NOTE — ANESTHESIA PREPROCEDURE EVALUATION
Patient: Doug Encinas    Procedure Information       Anesthesia Start Date/Time: 25 1219    Procedure: REVISION, TOTAL ARTHROPLASTY, KNEE (Left: Knee) - depuy attune with full revision backup, stems sleeves hinge. uni to total    Location: [facility] 03 / [facility]    Surgeons: Florencio Pruitt MD            Relevant Problems   Cardiac   (+) Mixed hyperlipidemia      GI   (+) Gastroesophageal reflux disease      Endocrine   (+) Type 2 diabetes mellitus without complication      Musculoskeletal   (+) Cervical spinal stenosis   (+) Localized, primary osteoarthritis of hand       Clinical information reviewed:   Tobacco  Allergies  Meds   Med Hx  Surg Hx   Fam Hx  Soc Hx        NPO Detail:  NPO/Void Status  Date of Last Liquid: 25  Time of Last Liquid: 103 (sips water with meds)  Date of Last Solid: 25  Time of Last Solid:          Physical Exam    Airway  Mallampati: II  TM distance: >3 FB  Neck ROM: full  Mouth openin finger widths     Cardiovascular - normal exam   Dental    Pulmonary - normal exam   Abdominal - normal exam           Anesthesia Plan    History of general anesthesia?: yes  History of complications of general anesthesia?: no    ASA 3     The patient is not a current smoker.    intravenous induction   Anesthetic plan and risks discussed with patient.    Plan discussed with CRNA and attending.

## 2025-04-29 NOTE — ANESTHESIA PROCEDURE NOTES
Airway  Date/Time: 4/29/2025 12:29 PM  Reason: elective    Airway not difficult    Staffing  Performed: CRNA   Authorized by: Joshua Crow MD    Performed by: ANITHA Clemente-ELIAS  Patient location during procedure: OR    Patient Condition  Indications for airway management: anesthesia  Patient position: sniffing  Sedation level: deep     Final Airway Details   Preoxygenated: yes  Final airway type: endotracheal airway  Successful airway: ETT  Cuffed: yes   Successful intubation technique: video laryngoscopy  Adjuncts used in placement: intubating stylet  Endotracheal tube insertion site: oral  Blade type: Chery.  Blade size: #4  ETT size (mm): 7.5  Cormack-Lehane Classification: grade IIa - partial view of glottis  Placement verified by: chest auscultation, capnometry and palpation of cuff   Cuff volume (mL): 10  Measured from: lips  ETT to lips (cm): 23  Number of attempts at approach: 1    Additional Comments  Lips and teeth remain in pre-anesthetic condition s/p intubation.

## 2025-04-29 NOTE — ANESTHESIA PROCEDURE NOTES
Peripheral Block    Patient location during procedure: pre-op  Medication administered at: 4/29/2025 12:00 PM  End time: 4/29/2025 12:10 PM  Reason for block: at surgeon's request and post-op pain management  Staffing  Performed: attending   Authorized by: Joshua Crow MD    Performed by: Joshua Crow MD  Preanesthetic Checklist  Completed: patient identified, IV checked, site marked, risks and benefits discussed, surgical consent, monitors and equipment checked, pre-op evaluation and timeout performed   Timeout performed at: 4/29/2025 11:00 AM  Peripheral Block  Patient position: laying flat  Prep: ChloraPrep and site prepped and draped  Patient monitoring: continuous pulse ox, heart rate and cardiac monitor  Block type: adductor canal  Laterality: right  Injection technique: single-shot  Guidance: nerve stimulator and ultrasound guided  Local infiltration: lidocaine  Infiltration strength: 1 %  Dose: 3 mL  Needle  Needle type: short-bevel   Needle gauge: 22 G  Needle length: 8 cm  Needle localization: ultrasound guidance     image stored in chart  Assessment  Injection assessment: negative aspiration for heme, local visualized surrounding nerve on ultrasound, no paresthesia on injection, incremental injection and transient paresthesias  Paresthesia pain: none  Heart rate change: no  Slow fractionated injection: yes  Additional Notes  R Adductor Canal Block  Time out, verbal and written consent  Sterile technique, wide prep w chlorhex   Sedation per record  Lido local, US guided  20ml Ropivicine 0.5%+ 20 ml Bupivicaine 0.5% + epi 1:200 + tetracaine 20 mg   No complications  Versed 2 mg

## 2025-04-30 ENCOUNTER — PHARMACY VISIT (OUTPATIENT)
Dept: PHARMACY | Facility: CLINIC | Age: 65
End: 2025-04-30
Payer: COMMERCIAL

## 2025-04-30 VITALS
DIASTOLIC BLOOD PRESSURE: 68 MMHG | BODY MASS INDEX: 27.92 KG/M2 | SYSTOLIC BLOOD PRESSURE: 109 MMHG | WEIGHT: 167.55 LBS | HEIGHT: 65 IN | OXYGEN SATURATION: 98 % | TEMPERATURE: 98.2 F | RESPIRATION RATE: 18 BRPM | HEART RATE: 87 BPM

## 2025-04-30 LAB
ANION GAP SERPL CALC-SCNC: 11 MMOL/L (ref 10–20)
BUN SERPL-MCNC: 19 MG/DL (ref 6–23)
CALCIUM SERPL-MCNC: 8.9 MG/DL (ref 8.6–10.3)
CHLORIDE SERPL-SCNC: 104 MMOL/L (ref 98–107)
CO2 SERPL-SCNC: 26 MMOL/L (ref 21–32)
CREAT SERPL-MCNC: 0.77 MG/DL (ref 0.5–1.3)
EGFRCR SERPLBLD CKD-EPI 2021: >90 ML/MIN/1.73M*2
ERYTHROCYTE [DISTWIDTH] IN BLOOD BY AUTOMATED COUNT: 13.1 % (ref 11.5–14.5)
GLUCOSE BLD MANUAL STRIP-MCNC: 131 MG/DL (ref 74–99)
GLUCOSE SERPL-MCNC: 153 MG/DL (ref 74–99)
HCT VFR BLD AUTO: 35.2 % (ref 41–52)
HGB BLD-MCNC: 12.2 G/DL (ref 13.5–17.5)
MCH RBC QN AUTO: 29.8 PG (ref 26–34)
MCHC RBC AUTO-ENTMCNC: 34.7 G/DL (ref 32–36)
MCV RBC AUTO: 86 FL (ref 80–100)
NRBC BLD-RTO: 0 /100 WBCS (ref 0–0)
PLATELET # BLD AUTO: 265 X10*3/UL (ref 150–450)
POTASSIUM SERPL-SCNC: 4.4 MMOL/L (ref 3.5–5.3)
RBC # BLD AUTO: 4.1 X10*6/UL (ref 4.5–5.9)
SODIUM SERPL-SCNC: 137 MMOL/L (ref 136–145)
WBC # BLD AUTO: 13 X10*3/UL (ref 4.4–11.3)

## 2025-04-30 PROCEDURE — 97110 THERAPEUTIC EXERCISES: CPT | Mod: GP

## 2025-04-30 PROCEDURE — 99232 SBSQ HOSP IP/OBS MODERATE 35: CPT | Performed by: NURSE PRACTITIONER

## 2025-04-30 PROCEDURE — 97116 GAIT TRAINING THERAPY: CPT | Mod: GP

## 2025-04-30 PROCEDURE — 99238 HOSP IP/OBS DSCHRG MGMT 30/<: CPT | Performed by: NURSE PRACTITIONER

## 2025-04-30 PROCEDURE — 2500000001 HC RX 250 WO HCPCS SELF ADMINISTERED DRUGS (ALT 637 FOR MEDICARE OP): Performed by: NURSE PRACTITIONER

## 2025-04-30 PROCEDURE — 96361 HYDRATE IV INFUSION ADD-ON: CPT

## 2025-04-30 PROCEDURE — 36415 COLL VENOUS BLD VENIPUNCTURE: CPT | Performed by: NURSE PRACTITIONER

## 2025-04-30 PROCEDURE — G0378 HOSPITAL OBSERVATION PER HR: HCPCS

## 2025-04-30 PROCEDURE — 85027 COMPLETE CBC AUTOMATED: CPT | Performed by: NURSE PRACTITIONER

## 2025-04-30 PROCEDURE — 96366 THER/PROPH/DIAG IV INF ADDON: CPT

## 2025-04-30 PROCEDURE — 2500000004 HC RX 250 GENERAL PHARMACY W/ HCPCS (ALT 636 FOR OP/ED): Mod: JZ | Performed by: NURSE PRACTITIONER

## 2025-04-30 PROCEDURE — 82947 ASSAY GLUCOSE BLOOD QUANT: CPT

## 2025-04-30 PROCEDURE — 97161 PT EVAL LOW COMPLEX 20 MIN: CPT | Mod: GP

## 2025-04-30 PROCEDURE — RXMED WILLOW AMBULATORY MEDICATION CHARGE

## 2025-04-30 PROCEDURE — 80048 BASIC METABOLIC PNL TOTAL CA: CPT | Performed by: NURSE PRACTITIONER

## 2025-04-30 RX ORDER — NAPROXEN 500 MG/1
500 TABLET ORAL 2 TIMES DAILY
Qty: 60 TABLET | Refills: 0 | Status: SHIPPED | OUTPATIENT
Start: 2025-04-30 | End: 2025-05-30

## 2025-04-30 RX ORDER — HYDROCODONE BITARTRATE AND ACETAMINOPHEN 5; 325 MG/1; MG/1
1 TABLET ORAL EVERY 6 HOURS PRN
Qty: 30 TABLET | Refills: 0 | Status: SHIPPED | OUTPATIENT
Start: 2025-04-30

## 2025-04-30 RX ADMIN — DOCUSATE SODIUM 100 MG: 100 CAPSULE, LIQUID FILLED ORAL at 09:31

## 2025-04-30 RX ADMIN — ACETAMINOPHEN 650 MG: 325 TABLET, FILM COATED ORAL at 05:39

## 2025-04-30 RX ADMIN — ACETAMINOPHEN 650 MG: 325 TABLET, FILM COATED ORAL at 00:08

## 2025-04-30 RX ADMIN — PANTOPRAZOLE SODIUM 40 MG: 40 TABLET, DELAYED RELEASE ORAL at 05:39

## 2025-04-30 RX ADMIN — ASPIRIN 81 MG: 81 TABLET, COATED ORAL at 09:30

## 2025-04-30 RX ADMIN — CEFAZOLIN SODIUM 2 G: 2 SOLUTION INTRAVENOUS at 00:08

## 2025-04-30 ASSESSMENT — PAIN - FUNCTIONAL ASSESSMENT
PAIN_FUNCTIONAL_ASSESSMENT: 0-10
PAIN_FUNCTIONAL_ASSESSMENT: 0-10

## 2025-04-30 ASSESSMENT — COGNITIVE AND FUNCTIONAL STATUS - GENERAL
MOBILITY SCORE: 23
CLIMB 3 TO 5 STEPS WITH RAILING: A LITTLE
MOBILITY SCORE: 24
DRESSING REGULAR LOWER BODY CLOTHING: A LITTLE
DAILY ACTIVITIY SCORE: 23

## 2025-04-30 ASSESSMENT — ACTIVITIES OF DAILY LIVING (ADL)
LACK_OF_TRANSPORTATION: NO
ADL_ASSISTANCE: INDEPENDENT

## 2025-04-30 ASSESSMENT — PAIN SCALES - GENERAL
PAINLEVEL_OUTOF10: 1
PAINLEVEL_OUTOF10: 2

## 2025-04-30 ASSESSMENT — PAIN DESCRIPTION - DESCRIPTORS: DESCRIPTORS: THROBBING

## 2025-04-30 NOTE — CARE PLAN
The patient's goals for the shift include      The clinical goals for the shift include pain control      Problem: Pain - Adult  Goal: Verbalizes/displays adequate comfort level or baseline comfort level  Outcome: Progressing     Problem: Safety - Adult  Goal: Free from fall injury  Outcome: Progressing     Problem: Discharge Planning  Goal: Discharge to home or other facility with appropriate resources  Outcome: Progressing     Problem: Chronic Conditions and Co-morbidities  Goal: Patient's chronic conditions and co-morbidity symptoms are monitored and maintained or improved  Outcome: Progressing     Problem: Nutrition  Goal: Nutrient intake appropriate for maintaining nutritional needs  Outcome: Progressing

## 2025-04-30 NOTE — NURSING NOTE
Discharge teaching was done. Pt states there are no questions at this time. Meds to beds was delivered. IV was taken out. AVS was given to patient.

## 2025-04-30 NOTE — PROGRESS NOTES
Physical Therapy    Physical Therapy Evaluation & Treatment    Patient Name: Doug Encinas  MRN: [id number]  Department: [address]  Room: [address] [place] [address]  Today's Date: 4/30/2025   Time Calculation  Start Time: 0837  Stop Time: 0926  Time Calculation (min): 49 min    Assessment/Plan   PT Assessment  PT Assessment Results: Decreased strength, Decreased range of motion, Decreased mobility  Rehab Prognosis: Excellent  Barriers to Discharge Home: No anticipated barriers  Evaluation/Treatment Tolerance: Patient tolerated treatment well  Medical Staff Made Aware: Yes  Strengths: Ability to acquire knowledge, Housing layout, Premorbid level of function, Support and attitude of living partners  Barriers to Participation: Comorbidities  End of Session Communication: Bedside nurse, Physician (Contacted Surgeon for clarification on movement and ROM restrictions with L knee immobilizer.  See precautions stated above)  Assessment Comment: Pt. is functioning at a supervised to modified independent level via FWW s/p L TKA wih L knee immobilizer donned throughout mobility.  Pt. has been instructed in HEP according to restrictions per surgeon, how to adjust his FWW appropriately, car transfers, pain management techniques, and safety for home-going.  Pt. is safe to return home with wife assist.  No further acute PT needs.  LOW INTENSITY PT FOLLOW-UP recommended.  End of Session Patient Position: Bed, 2 rail up, Alarm off, not on at start of session (RN aware and in agreement.  Wife present and able to assist as needed.)   IP OR SWING BED PT PLAN  Inpatient or Swing Bed: Inpatient  PT Plan  PT Plan: PT Eval only  PT Eval Only Reason: Safe to return home  PT Frequency: PT eval only  PT Discharge Recommendations: Low intensity level of continued care, No further acute PT  Equipment Recommended upon Discharge: Wheeled walker (owns)  PT Recommended Transfer Status: Independent (with FWW and L knee immobilizer)  PT - OK to Discharge:  Yes      Subjective     General Visit Information:  General  Reason for Referral: 66 yo male referred to PT s/p L TKA by Dr. Pruitt on 4/29/2025.  Past Medical History Relevant to Rehab: PMH: Diabetes Mellitus   GERD  Hypertension  Hyperlipidemia  Osteoarthritis  Nephrolithiasis  BPH  Colonoscopy  Partial Knee Arthroplasty  Anterior Cervical Discectomy Fusion  Great Toe Arthroplasty - Bilateral  Family/Caregiver Present: Yes  Caregiver Feedback: wife present and supportive, confirms PLOF  Prior to Session Communication: Bedside nurse  Patient Position Received: Bed, 2 rail up, Alarm off, not on at start of session  General Comment: Pleasant, cooperative, agreeable to therapy eval.  Home Living:  Home Living  Type of Home: House  Lives With: Spouse  Home Adaptive Equipment: Walker rolling or standard, Cane  Home Layout: One level  Home Access: Stairs to enter with rails  Entrance Stairs-Rails: Right  Entrance Stairs-Number of Steps: 2  Bathroom Shower/Tub: Walk-in shower  Bathroom Toilet: Standard  Bathroom Equipment: Grab bars in shower  Prior Level of Function:  Prior Function Per Pt/Caregiver Report  Level of [place]: Independent with ADLs and functional transfers  ADL Assistance: Independent  Homemaking Assistance: Independent  Ambulatory Assistance: Independent  Vocational: Full time employment (works for [employer])  Leisure: enjoys [employer]  Prior Function Comments: drives  Precautions:  Precautions  LE Weight Bearing Status: Weight Bearing as Tolerated (LLE)  Post-Surgical Precautions: Left total knee precautions (Also - PER SURGEON - maintain knee straight with knee immobilizer and NO ROM until follow-up visit due to special surgical considerations involving quadriceps tendon.)  Braces Applied: L knee immobilizer - maintained throughout session  Precautions Comment: Per Dr. Pruitt: Maintain knee ext until follow-up visit. Wear immobilizer when ambulating. No ROM for first couple weeks but when he comes to  office surgeon will take it off and start bending the knee.      Objective   Pain:  Pain Assessment  Pain Assessment: 0-10  0-10 (Numeric) Pain Score: 2  Pain Type: Surgical pain  Pain Location: Knee  Pain Orientation: Left  Pain Descriptors: Throbbing  Pain Frequency: Constant/continuous  Pain Onset: Ongoing  Clinical Progression: Not changed  Pain Interventions: Ambulation/increased activity, Repositioned, Cold applied (Education re: pain management techniques)  Response to Interventions: Resting quietly, Content/relaxed  Cognition:  Cognition  Overall Cognitive Status: Within Functional Limits  Attention: Within Functional Limits  Memory: Within Funtional Limits  Safety/Judgement: Within Functional Limits    General Assessments:     Activity Tolerance  Endurance: Endurance does not limit participation in activity  Early Mobility/Exercise Safety Screen: Proceed with mobilization - No exclusion criteria met    Strength  Strength Comments: BUE WFL, RLE WFL, L hip & ankle WFL, knee NT d/t knee immoblizer in place - able to SLR  Strength  Strength Comments: BUE WFL, RLE WFL, L hip & ankle WFL, knee NT d/t knee immoblizer in place - able to SLR    Coordination  Movements are Fluid and Coordinated: Yes    Static Sitting Balance  Static Sitting-Balance Support: No upper extremity supported, Feet supported  Static Sitting-Level of Assistance: Independent    Static Standing Balance  Static Standing-Balance Support: No upper extremity supported  Static Standing-Level of Assistance: Independent  Functional Assessments:  Bed Mobility  Bed Mobility: Yes  Bed Mobility 1  Bed Mobility 1: Supine to sitting, Sitting to supine  Level of Assistance 1: Independent  Bed Mobility Comments 1: knee immoblizer donned    Transfers  Transfer: Yes  Transfer 1  Transfer From 1: Bed to  Transfer to 1: Stand  Technique 1: Sit to stand, Stand to sit  Transfer Device 1: Walker  Transfer Level of Assistance 1: Modified independent  Trials/Comments  1: knee immoblizer donned; pt. instructed in safe technique and hand placement - able to implement immediately and consistently  Transfers 2  Technique 2: Sit to stand, Stand to sit  Transfer Level of Assistance 2: Close supervision  Trials/Comments 2: Reviewed and practices car transfer with L knee immobilizer in place - pt. and wife demo understanding and able to problem solve through safe options d/t immobilizer    Ambulation/Gait Training  Ambulation/Gait Training Performed: Yes  Ambulation/Gait Training 1  Surface 1: Level tile  Device 1: Rolling walker  Gait Support Devices: Knee immobilizer (Left)  Assistance 1: Modified independent  Quality of Gait 1: Inconsistent stride length, Antalgic  Comments/Distance (ft) 1: 125 ft x 2, safe, steady, mildly antalgic, able to negotiate obstacles and directional changes safely    Stairs  Stairs: Yes  Stairs  Rails 1: Bilateral  Device 1: Railing  Support Devices 1:  (L knee immoblizer donned)  Assistance 1: Close supervision  Comment/Number of Steps 1: 4  Extremity/Trunk Assessments:  RUE   RUE : Within Functional Limits  LUE   LUE: Within Functional Limits  RLE   RLE : Within Functional Limits  LLE   LLE :  (hip & ankle WFL,  knee NT d/t knee immobilizer)  Treatments:  Therapeutic Exercise  Therapeutic Exercise Performed: Yes  Therapeutic Exercise Activity 1: Completed 5 reps of TKA exercises according to protocol with modification to avoid all ROM and maintain knee ext with knee immoblizer throughout.  Pt. and wife instructed in ther ex and restrictions per surgeon.  NO ROM until follow-up with surgeon.  Wife and pt. verbalized understanding.  Modified HEP handout given and reviewed for home-going.    Bed Mobility  Bed Mobility: Yes  Bed Mobility 1  Bed Mobility 1: Supine to sitting, Sitting to supine  Level of Assistance 1: Independent  Bed Mobility Comments 1: knee immoblizer donned    Ambulation/Gait Training  Ambulation/Gait Training Performed: Yes  Ambulation/Gait  Training 1  Surface 1: Level tile  Device 1: Rolling walker  Gait Support Devices: Knee immobilizer (Left)  Assistance 1: Modified independent  Quality of Gait 1: Inconsistent stride length, Antalgic  Comments/Distance (ft) 1: 125 ft x 2, safe, steady, mildly antalgic, able to negotiate obstacles and directional changes safely  Transfers  Transfer: Yes  Transfer 1  Transfer From 1: Bed to  Transfer to 1: Stand  Technique 1: Sit to stand, Stand to sit  Transfer Device 1: Walker  Transfer Level of Assistance 1: Modified independent  Trials/Comments 1: knee immoblizer donned; pt. instructed in safe technique and hand placement - able to implement immediately and consistently  Transfers 2  Technique 2: Sit to stand, Stand to sit  Transfer Level of Assistance 2: Close supervision  Trials/Comments 2: Reviewed and practices car transfer with L knee immobilizer in place - pt. and wife demo understanding and able to problem solve through safe options d/t immobilizer    Stairs  Stairs: Yes  Stairs  Rails 1: Bilateral  Device 1: Railing  Support Devices 1:  (L knee immoblizer donned)  Assistance 1: Close supervision  Comment/Number of Steps 1: 4  Outcome Measures:  [facility] Basic Mobility  Turning from your back to your side while in a flat bed without using bedrails: None  Moving from lying on your back to sitting on the side of a flat bed without using bedrails: None  Moving to and from bed to chair (including a wheelchair): None  Standing up from a chair using your arms (e.g. wheelchair or bedside chair): None  To walk in hospital room: None  Climbing 3-5 steps with railing: A little  Basic Mobility - Total Score: 23    Encounter Problems       Encounter Problems (Active)       Pain - Adult             Encounter Problems (Resolved)       Mobility       Goal 1 Pt and wife will demonstrate understanding of how to safely complete bed mobility, transfers, ambulation with device, stair negotiation, and HEP in preparation for  discharge to home (Met)       Start:  04/30/25    Expected End:  04/30/25    Resolved:  04/30/25                Education Documentation  Handouts, taught by Enriqueta Vázquez PT at 4/30/2025  9:54 AM.  Learner: Significant Other, Patient  Readiness: Eager  Method: Explanation, Demonstration, Handout  Response: Verbalizes Understanding, Demonstrated Understanding  Comment: Reviewed special movement/ROM restrictions with use of L knee immobilizer during all mobility tasks and HEP for home-going    Precautions, taught by Enriqueta Vázquez PT at 4/30/2025  9:54 AM.  Learner: Significant Other, Patient  Readiness: Eager  Method: Explanation, Demonstration, Handout  Response: Verbalizes Understanding, Demonstrated Understanding  Comment: Reviewed special movement/ROM restrictions with use of L knee immobilizer during all mobility tasks and HEP for home-going    Body Mechanics, taught by Enriqueta Vázquez PT at 4/30/2025  9:54 AM.  Learner: Significant Other, Patient  Readiness: Eager  Method: Explanation, Demonstration, Handout  Response: Verbalizes Understanding, Demonstrated Understanding  Comment: Reviewed special movement/ROM restrictions with use of L knee immobilizer during all mobility tasks and HEP for home-going    Home Exercise Program, taught by Enriqueta Vázquez PT at 4/30/2025  9:54 AM.  Learner: Significant Other, Patient  Readiness: Eager  Method: Explanation, Demonstration, Handout  Response: Verbalizes Understanding, Demonstrated Understanding  Comment: Reviewed special movement/ROM restrictions with use of L knee immobilizer during all mobility tasks and HEP for home-going    Mobility Training, taught by Enriqueta Vázquez PT at 4/30/2025  9:54 AM.  Learner: Significant Other, Patient  Readiness: Eager  Method: Explanation, Demonstration, Handout  Response: Verbalizes Understanding, Demonstrated Understanding  Comment: Reviewed special movement/ROM restrictions with use of L knee  immobilizer during all mobility tasks and HEP for home-going    Education Comments  No comments found.

## 2025-04-30 NOTE — PROGRESS NOTES
04/30/25 1116   Discharge Planning   Living Arrangements Spouse/significant other   Support Systems Spouse/significant other;Family members;Friends/neighbors   Assistance Needed Patient is A&O X3, on room air, independent with ADLs and uses no DME at baseline. Patient has walker to use post-op. Patient was driving prior to surgery. Patient voices no other DC needs other than new [facility] that referral was previously sent for prior to surgery.   Type of Residence Private residence   Who is requesting discharge planning? Provider   Home or Post Acute Services In home services   Type of Home Care Services Home PT   Expected Discharge Disposition Home H   Does the patient need discharge transport arranged? No   Financial Resource Strain   How hard is it for you to pay for the very basics like food, housing, medical care, and heating? Not hard   Housing Stability   In the last 12 months, was there a time when you were not able to pay the mortgage or rent on time? N   At any time in the past 12 months, were you homeless or living in a shelter (including now)? N   Transportation Needs   In the past 12 months, has lack of transportation kept you from medical appointments or from getting medications? no   In the past 12 months, has lack of transportation kept you from meetings, work, or from getting things needed for daily living? No   Patient Choice   Provider Choice list and CMS website (https://medicare.gov/care-compare#search) for post-acute Quality and Resource Measure Data were provided and reviewed with: Patient   Patient / Family choosing to utilize agency / facility established prior to hospitalization No   Stroke Family Assessment   Stroke Family Assessment Needed No   Intensity of Service   Intensity of Service 0-30 min

## 2025-04-30 NOTE — PROGRESS NOTES
Patient: Doug Encinas  Room/bed: 147/147-A  Admitted on: 4/29/2025    Age: 65 y.o.   Gender: male  Code Status:  Full Code   Admitting Dx: Acute pain of left knee [M25.562]  S/P revision of total knee, left [Z96.652]    MRN: [id number]  PCP: Aba Horne MD       Subjective   Seen and examined in his room this AM. Awake and alert. Doing well. He denies chest pain, breathing difficulties, abdominal pain, N/V/D/C, fever, or chills.  Anticipates discharge to home today.     Objective    Physical Exam   Constitutional: A&O x 3; NAD; calm and cooperative  Eyes: EOM's intact  HEENT: Normocephalic, Atraumatic. Oral mucosa moist.   Neck: Supple. No JVD, lymphadenopathy.   Lungs: CTAB with fair air movement. Respirations even and unlabored on room air.   Heart: RRR  Abdomen: Soft, non-tender, non-distended, +BS  MS/Extremities: NGO x 4 with LLE ACE Wrapped and wearing immobilizer. Toes are warm/pink with intact sensation. No edema. Peripheral pulses intact bilaterally.   Neuro: A&O x3; no focal deficits; gross motor and sensation intact.   Skin: Warm and dry. No rashes or lesions  Psych: Normal affect.      Temp:  [36.3 °C (97.3 °F)-37.3 °C (99.1 °F)] 36.8 °C (98.2 °F)  Heart Rate:  [] 87  Resp:  [12-18] 18  BP: (109-132)/(68-83) 109/68    Vitals:    04/29/25 1022   Weight: 76 kg (167 lb 8.8 oz)             I/Os    Intake/Output Summary (Last 24 hours) at 4/30/2025 1337  Last data filed at 4/30/2025 0800  Gross per 24 hour   Intake 3490 ml   Output 450 ml   Net 3040 ml       Labs:   Results from last 72 hours   Lab Units 04/30/25  0626   SODIUM mmol/L 137   POTASSIUM mmol/L 4.4   CHLORIDE mmol/L 104   CO2 mmol/L 26   BUN mg/dL 19   CREATININE mg/dL 0.77   GLUCOSE mg/dL 153*   CALCIUM mg/dL 8.9   ANION GAP mmol/L 11   EGFR mL/min/1.73m*2 >90      Results from last 72 hours   Lab Units 04/30/25  0626   WBC AUTO x10*3/uL 13.0*   HEMOGLOBIN g/dL 12.2*   HEMATOCRIT % 35.2*   PLATELETS AUTO x10*3/uL 265      Lab Results    Component Value Date    CALCIUM 8.9 04/30/2025      Lab Results   Component Value Date    CRP 0.11 12/14/2024      Micro/ID:   No results found for the last 90 days.    Images:  XR knee left 1-2 views  Narrative: Interpreted By:  Sekou Clayton,   STUDY:  Left knee dated  4/29/2025.      INDICATION:  Signs/Symptoms:post operative knee      COMPARISON:  Radiographs dated 12/11/2024.      ACCESSION NUMBER(S):  [id number]      ORDERING CLINICIAN:  HATTIE PRUITT      TECHNIQUE:  Two views of the left knee.      FINDINGS:  No fracture or dislocation is evident.  Knee joint gas and fluid and  subcutaneous emphysema and edema are seen from recent surgery.  There is a femorotibial arthroplasty with cement fixation in the  femur and tibia. Hardware is intact.      Impression: No osseous injury or hardware complication is evident.      MACRO:  None      Signed by: Sekou Clayton 4/29/2025 4:27 PM  Dictation workstation:   [id number]       Meds    Scheduled medications  Scheduled Medications[1]  Continuous medications  Continuous Medications[2]  PRN medications  PRN Medications[3]     Assessment and Plan    Doug Encinas is a 65 y.o. male with a medical history of HTN, DM II, and OA who presented to [facility] for an elective left total knee arthroplasty revision by Dr. Pruitt. Consulted for medical management.      CV: HTN  -Lisinopril resumed  -BP stable postoperatively; one documented HR of 35 at 0330; no symptoms. Suspect may be recorded in error.      Left Knee Osteoarthritis  -S/P left TKA revision by Dr. Pruitt on 4/29/25.   -Incisional care, antibiotics, activity restrictions per orthopedics surgery.  -PT/OT to follow. WBAT with immobilizer as instructed not to bend his knee till follow up.   -Medicate for pain  -Maintain bowel regimen  -Advised IS use, mobilization.   -Monitor H&H for ABLA. Hgb stable at 12.2.    -DVT prophylaxis per surgery: ASA 81mg BID  -Afebrile. Mild leukocytosis.      Diabetes Mellitus Type  II  -Hold oral agents while inpatient  -On Insulin Sliding Scale  -Maintain hypoglycemic protocol     Hyperlipidemia  -On Statin therapy     GERD  -On PPI    Fluids/Electrolytes/Nutrition  -Laboratory data reviewed: CBC/BMP.   -Electrolytes stable.   -No nutritional concerns at this time.       Disposition  -Plan of care discussed with medicine, Dr. Wrae.   -Discharge per orthopedics. Medically cleared.       Yokasta Boyce, APRN-CNP        [1] acetaminophen, 650 mg, oral, q6h ADRIANA  aspirin, 81 mg, oral, BID  atorvastatin, 80 mg, oral, Nightly  docusate sodium, 100 mg, oral, BID  gabapentin, 300 mg, oral, Nightly  insulin lispro, 0-5 Units, subcutaneous, TID AC  lisinopril, 20 mg, oral, Nightly  pantoprazole, 40 mg, oral, Daily before breakfast  polyethylene glycol, 17 g, oral, Daily  [2] lactated Ringer's, 100 mL/hr, Last Rate: 100 mL/hr (04/29/25 2106)  oxygen, 2 L/min, Last Rate: Stopped (04/30/25 0931)  [3] PRN medications: dextrose, dextrose, glucagon, glucagon, morphine, naloxone, ondansetron **OR** ondansetron, oxyCODONE, oxyCODONE

## 2025-04-30 NOTE — DISCHARGE SUMMARY
Discharge Diagnosis  Acute pain of left knee    Issues Requiring Follow-Up  Left knee arthroplasty revision    Test Results Pending At Discharge  Pending Labs       Order Current Status    Surgical Pathology Exam In process    Tissue/Wound Culture/Smear Preliminary result    Tissue/Wound Culture/Smear Preliminary result    Tissue/Wound Culture/Smear Preliminary result            Hospital Course   On 4/29/25 pt was taken to OR with Dr. Pruitt for revision of right knee total arthroplasty. There were no surgical complications and pt was then transferred to PACU and then to regular nursing floor.   There were no acute overnight events.   Pt's pain has been tolerable.   He has worked with therapy services.   He has tolerated PO without n/v. He has passed gas and urinated without diff.   No CP or SOB sx.   His morning labs are stable.   He is stable for discharge home.     He will go home with left knee immobilizer which he will keep on until his follow up with Dr. Pruitt.     Pertinent Physical Exam At Time of Discharge  Physical Exam  Vitals reviewed.   Constitutional:       General: He is not in acute distress.     Appearance: Normal appearance. He is normal weight. He is not ill-appearing, toxic-appearing or diaphoretic.   HENT:      Head: Normocephalic and atraumatic.      Mouth/Throat:      Mouth: Mucous membranes are moist.   Eyes:      General: No scleral icterus.        Right eye: No discharge.         Left eye: No discharge.      Conjunctiva/sclera: Conjunctivae normal.   Cardiovascular:      Rate and Rhythm: Normal rate and regular rhythm.      Pulses: Normal pulses.      Heart sounds: Normal heart sounds. No murmur heard.     No friction rub. No gallop.   Pulmonary:      Effort: Pulmonary effort is normal. No respiratory distress.      Breath sounds: Normal breath sounds. No stridor. No wheezing, rhonchi or rales.   Chest:      Chest wall: No tenderness.   Abdominal:      General: Bowel sounds are normal.  There is no distension.      Palpations: Abdomen is soft. There is no mass.      Tenderness: There is no abdominal tenderness. There is no guarding or rebound.      Hernia: No hernia is present.   Musculoskeletal:         General: Swelling (left knee) and tenderness (left knee) present.      Right lower leg: No edema.      Left lower leg: No edema.      Comments: Left knee with ace wrap and surgical dressing in place. No bleeding. Knee immobilizer in place.   + 2 DP pulses jony. Normal sensation jony lower ext.   4/5 strength LLE d/t pain.    Skin:     General: Skin is warm and dry.   Neurological:      Mental Status: He is alert and oriented to person, place, and time.      Motor: Weakness (LLE) present.      Gait: Gait normal.   Psychiatric:         Mood and Affect: Mood normal.         Behavior: Behavior normal.         Thought Content: Thought content normal.         Judgment: Judgment normal.         Home Medications     Medication List      START taking these medications     aspirin 81 mg chewable tablet; Chew 1 tablet (81 mg) 2 times a day for   28 days.   cefadroxil 500 mg capsule; Commonly known as: Duricef; Take 1 capsule   (500 mg) by mouth 2 times a day for 7 days.   docusate sodium 100 mg capsule; Commonly known as: Colace; Take 1   capsule (100 mg) by mouth 2 times a day for 10 days.   HYDROcodone-acetaminophen 5-325 mg tablet; Commonly known as: Norco;   Take 1 tablet by mouth every 6 hours if needed for moderate pain (4 - 6)   or severe pain (7 - 10).   polyethylene glycol 17 gram/dose powder; Commonly known as: Glycolax,   Miralax; Mix 17 g of powder and drink once daily.     CHANGE how you take these medications     lisinopril 20 mg tablet; Take 1 tablet (20 mg) by mouth once daily.;   What changed: when to take this   omeprazole 20 mg DR capsule; Commonly known as: PriLOSEC; TAKE 1 CAPSULE   DAILY; What changed: when to take this     CONTINUE taking these medications     atorvastatin 80 mg tablet;  Commonly known as: Lipitor; TAKE 1 TABLET   DAILY   gabapentin 300 mg capsule; Commonly known as: Neurontin   ibuprofen 600 mg tablet; Take 1 tablet (600 mg) by mouth 4 times a day   as needed for mild pain (1 - 3) (pain). Do not fill before April 30, 2025.   metFORMIN  mg 24 hr tablet; Commonly known as: Glucophage-XR; TAKE   1 TABLET TWICE A DAY     STOP taking these medications     tamsulosin 0.4 mg 24 hr capsule; Commonly known as: Flomax       Outpatient Follow-Up  Future Appointments   Date Time Provider Department Center   5/1/2025 To Be Determined Adriane Salmon, PT [facility]   5/14/2025  8:15 AM Nick Jo PA-C [id number] [facility]   12/26/2025  8:00 AM Aba Horne MD [id number] [facility]       Kika Mariano, APRN-CNP

## 2025-05-01 ENCOUNTER — HOME CARE VISIT (OUTPATIENT)
Dept: HOME HEALTH SERVICES | Facility: HOME HEALTH | Age: 65
End: 2025-05-01
Payer: COMMERCIAL

## 2025-05-01 VITALS
HEART RATE: 67 BPM | TEMPERATURE: 98 F | SYSTOLIC BLOOD PRESSURE: 118 MMHG | OXYGEN SATURATION: 94 % | RESPIRATION RATE: 18 BRPM | DIASTOLIC BLOOD PRESSURE: 62 MMHG

## 2025-05-01 LAB
BACTERIA SPEC CULT: NORMAL
GRAM STN SPEC: NORMAL
GRAM STN SPEC: NORMAL

## 2025-05-01 PROCEDURE — G0151 HHCP-SERV OF PT,EA 15 MIN: HCPCS

## 2025-05-01 SDOH — HEALTH STABILITY: PHYSICAL HEALTH
EXERCISE COMMENTS: INSTRUCTED PATIENT IN HEP X 10 REPS EACH:  - ANKLE PUMPS  - QUAD SETS  - GLUTE SETS  - HIP ABD  - AMBULATING HOURLY

## 2025-05-01 ASSESSMENT — ENCOUNTER SYMPTOMS
PAIN LOCATION - PAIN FREQUENCY: CONSTANT
PAIN LOCATION - PAIN SEVERITY: 5/10
PAIN LOCATION - EXACERBATING FACTORS: MOVEMENT
LOWEST PAIN SEVERITY IN PAST 24 HOURS: 1/10
PERSON REPORTING PAIN: PATIENT
PAIN LOCATION - PAIN QUALITY: THROBBING
PAIN LOCATION: LEFT KNEE
PAIN: 1
HIGHEST PAIN SEVERITY IN PAST 24 HOURS: 9/10

## 2025-05-01 ASSESSMENT — ACTIVITIES OF DAILY LIVING (ADL)
AMBULATION_DISTANCE/DURATION_TOLERATED: 40 FEET
OASIS_M1830: 03
AMBULATION ASSISTANCE ON FLAT SURFACES: 1
ENTERING_EXITING_HOME: MODERATE ASSIST

## 2025-05-02 ENCOUNTER — TELEPHONE (OUTPATIENT)
Dept: ORTHOPEDIC SURGERY | Facility: CLINIC | Age: 65
End: 2025-05-02
Payer: COMMERCIAL

## 2025-05-02 LAB
BACTERIA SPEC CULT: NORMAL
BACTERIA SPEC CULT: NORMAL
GRAM STN SPEC: NORMAL

## 2025-05-02 NOTE — TELEPHONE ENCOUNTER
RT TOTAL ARTHROPLASTY KNEE 4/29/25    Patient called and stated that tyler reached out and said he is on pending status because  needs to send an email that states he did his surgery on 4/29/25. Patient requested a call back.    [phone]

## 2025-05-02 NOTE — TELEPHONE ENCOUNTER
Spoke with patient, sent letter to [facility] letting them know the patient has his surgery on 4/29/25, along with op notes.

## 2025-05-05 ENCOUNTER — HOME CARE VISIT (OUTPATIENT)
Dept: HOME HEALTH SERVICES | Facility: HOME HEALTH | Age: 65
End: 2025-05-05
Payer: COMMERCIAL

## 2025-05-05 VITALS
DIASTOLIC BLOOD PRESSURE: 74 MMHG | TEMPERATURE: 98.2 F | OXYGEN SATURATION: 99 % | RESPIRATION RATE: 18 BRPM | SYSTOLIC BLOOD PRESSURE: 126 MMHG | HEART RATE: 82 BPM

## 2025-05-05 PROCEDURE — G0151 HHCP-SERV OF PT,EA 15 MIN: HCPCS

## 2025-05-05 SDOH — HEALTH STABILITY: PHYSICAL HEALTH: EXERCISE ACTIVITY: CALF RAISES, HIP FLEX/EXT/ABD

## 2025-05-05 SDOH — HEALTH STABILITY: PHYSICAL HEALTH: EXERCISE ACTIVITIES SETS: 1

## 2025-05-05 SDOH — HEALTH STABILITY: PHYSICAL HEALTH: PHYSICAL EXERCISE: SUPINE

## 2025-05-05 SDOH — HEALTH STABILITY: PHYSICAL HEALTH: PHYSICAL EXERCISE: 30

## 2025-05-05 SDOH — HEALTH STABILITY: PHYSICAL HEALTH

## 2025-05-05 SDOH — HEALTH STABILITY: PHYSICAL HEALTH: EXERCISE ACTIVITY: APS, GLUTE/QUAD SETS

## 2025-05-05 SDOH — HEALTH STABILITY: PHYSICAL HEALTH: PHYSICAL EXERCISE: 15

## 2025-05-05 SDOH — HEALTH STABILITY: PHYSICAL HEALTH: EXERCISE ACTIVITY: SLR, HIP ABD

## 2025-05-05 SDOH — HEALTH STABILITY: PHYSICAL HEALTH: PHYSICAL EXERCISE: STANDING

## 2025-05-05 ASSESSMENT — ENCOUNTER SYMPTOMS: DENIES PAIN: 1

## 2025-05-12 ENCOUNTER — TELEPHONE (OUTPATIENT)
Dept: PRIMARY CARE | Facility: CLINIC | Age: 65
End: 2025-05-12
Payer: COMMERCIAL

## 2025-05-12 NOTE — TELEPHONE ENCOUNTER
Rx Refill Request Telephone Encounter    Name:  Doug Encinas  :  [id number]  Medication Name:  Mounjaro, he Is out            Specific Pharmacy location:    pharmacy  Date of last appointment:  25  Date of next appointment:    Best number to reach patient:

## 2025-05-12 NOTE — TELEPHONE ENCOUNTER
Patient called on RX line to request a refill of Mounjaro 5 mg be forwarded to [facility].  Patient states Shelley started him on this medication. Patient was last seen on 12/24/2024 and has an upcoming physical on 12/26/2025.  Please advise.     Patient ph# [phone]

## 2025-05-13 ENCOUNTER — TELEPHONE (OUTPATIENT)
Dept: PRIMARY CARE | Facility: CLINIC | Age: 65
End: 2025-05-13
Payer: COMMERCIAL

## 2025-05-13 DIAGNOSIS — E11.9 TYPE 2 DIABETES MELLITUS WITHOUT COMPLICATION, UNSPECIFIED WHETHER LONG TERM INSULIN USE: ICD-10-CM

## 2025-05-13 NOTE — TELEPHONE ENCOUNTER
Spoke with patient.  He is aware that is has been a few weeks since last injection. He will wait until his OV 5/23/2025 for new Rx

## 2025-05-14 ENCOUNTER — HOSPITAL ENCOUNTER (OUTPATIENT)
Dept: RADIOLOGY | Facility: CLINIC | Age: 65
Discharge: HOME | End: 2025-05-14
Payer: COMMERCIAL

## 2025-05-14 ENCOUNTER — APPOINTMENT (OUTPATIENT)
Dept: ORTHOPEDIC SURGERY | Facility: CLINIC | Age: 65
End: 2025-05-14
Payer: COMMERCIAL

## 2025-05-14 DIAGNOSIS — Z96.652 S/P TOTAL KNEE ARTHROPLASTY, LEFT: ICD-10-CM

## 2025-05-14 PROCEDURE — 1036F TOBACCO NON-USER: CPT

## 2025-05-14 PROCEDURE — 99024 POSTOP FOLLOW-UP VISIT: CPT

## 2025-05-14 PROCEDURE — 4010F ACE/ARB THERAPY RXD/TAKEN: CPT

## 2025-05-14 PROCEDURE — 73564 X-RAY EXAM KNEE 4 OR MORE: CPT | Mod: LT

## 2025-05-14 PROCEDURE — 73562 X-RAY EXAM OF KNEE 3: CPT | Mod: RT

## 2025-05-14 ASSESSMENT — PAIN - FUNCTIONAL ASSESSMENT: PAIN_FUNCTIONAL_ASSESSMENT: NO/DENIES PAIN

## 2025-05-14 NOTE — PROGRESS NOTES
Doing fantastic has been compliant with straight leg brace and we took it off today, will statrt working on ROM with PT is scheduled for outpatient in a weeko rso   Chief Complaint   Patient presents with    Left Knee - Post-op     04/26/25 LT TKA REV.         This is a 65 y.o. male who is 2 weeks out from left total knee revision.  Pain is under control with current medications.  No drainage from his incision no fevers or chills.  Progressing well with physical therapy and appropriately improving in function.  Patient has been doing fantastic with his recovery so far.  No other new issues or symptoms.    Left knee examination: Surgical incision well approximated no erythema no drainage.  Stable to varus valgus stress range of motion.  Neurovascular tact distally    X-rays of the knee were reviewed independently interpreted by me today, the show stable total knee arthroplasty no fracture dislocation or loosening    Impression plan: 65 y.o. male 2 weeks out from left total knee revision.  We discussed continuing physical therapy and home exercise program and starting to work on increasing range of motion. Pain medications to be used as needed. Assistive devices as needed per PT. We discussed DVT prophylaxis until 6 weeks. No dentist until 3 months and thereafter dental prophylaxis for life. Activity as tolerated weightbearing as tolerated. I have personally reviewed the OARRS report for the patient. This report is scanned into the electronic medical record. I have considered the risks of abuse, dependence, addiction and diversion. Follow up 4 weeks with xrays.  Patient is scheduled with outpatient physical therapy and is excited to start working on the neck step in his recovery.  Patient understands that there is such a thing is overdoing it and he will be sure not to.

## 2025-05-15 ENCOUNTER — HOME CARE VISIT (OUTPATIENT)
Dept: HOME HEALTH SERVICES | Facility: HOME HEALTH | Age: 65
End: 2025-05-15
Payer: COMMERCIAL

## 2025-05-15 LAB
LABORATORY COMMENT REPORT: NORMAL
PATH REPORT.FINAL DX SPEC: NORMAL
PATH REPORT.GROSS SPEC: NORMAL
PATH REPORT.RELEVANT HX SPEC: NORMAL
PATH REPORT.TOTAL CANCER: NORMAL

## 2025-05-18 LAB
ALBUMIN SERPL-MCNC: 4.5 G/DL (ref 3.6–5.1)
ALBUMIN/CREAT UR: NORMAL
ALP SERPL-CCNC: 70 U/L (ref 35–144)
ALT SERPL-CCNC: 16 U/L (ref 9–46)
ANION GAP SERPL CALCULATED.4IONS-SCNC: 7 MMOL/L (CALC) (ref 7–17)
AST SERPL-CCNC: 15 U/L (ref 10–35)
BILIRUB SERPL-MCNC: 0.6 MG/DL (ref 0.2–1.2)
BUN SERPL-MCNC: 24 MG/DL (ref 7–25)
CALCIUM SERPL-MCNC: 9.7 MG/DL (ref 8.6–10.3)
CHLORIDE SERPL-SCNC: 104 MMOL/L (ref 98–110)
CO2 SERPL-SCNC: 28 MMOL/L (ref 20–32)
CREAT SERPL-MCNC: 0.66 MG/DL (ref 0.7–1.35)
CREAT UR-MCNC: NORMAL MG/DL
EGFRCR SERPLBLD CKD-EPI 2021: 105 ML/MIN/1.73M2
EST. AVERAGE GLUCOSE BLD GHB EST-MCNC: 123 MG/DL
EST. AVERAGE GLUCOSE BLD GHB EST-SCNC: 6.8 MMOL/L
GLUCOSE SERPL-MCNC: 123 MG/DL (ref 65–99)
HBA1C MFR BLD: 5.9 %
MICROALBUMIN UR-MCNC: NORMAL
POTASSIUM SERPL-SCNC: 5.1 MMOL/L (ref 3.5–5.3)
PROT SERPL-MCNC: 6.7 G/DL (ref 6.1–8.1)
SODIUM SERPL-SCNC: 139 MMOL/L (ref 135–146)

## 2025-05-18 ASSESSMENT — ACTIVITIES OF DAILY LIVING (ADL)
HOME_HEALTH_OASIS: 00
OASIS_M1830: 01

## 2025-05-19 LAB
ALBUMIN SERPL-MCNC: 4.5 G/DL (ref 3.6–5.1)
ALBUMIN/CREAT UR: 19 MG/G CREAT
ALP SERPL-CCNC: 70 U/L (ref 35–144)
ALT SERPL-CCNC: 16 U/L (ref 9–46)
ANION GAP SERPL CALCULATED.4IONS-SCNC: 7 MMOL/L (CALC) (ref 7–17)
AST SERPL-CCNC: 15 U/L (ref 10–35)
BILIRUB SERPL-MCNC: 0.6 MG/DL (ref 0.2–1.2)
BUN SERPL-MCNC: 24 MG/DL (ref 7–25)
CALCIUM SERPL-MCNC: 9.7 MG/DL (ref 8.6–10.3)
CHLORIDE SERPL-SCNC: 104 MMOL/L (ref 98–110)
CO2 SERPL-SCNC: 28 MMOL/L (ref 20–32)
CREAT SERPL-MCNC: 0.66 MG/DL (ref 0.7–1.35)
CREAT UR-MCNC: 139 MG/DL (ref 20–320)
EGFRCR SERPLBLD CKD-EPI 2021: 105 ML/MIN/1.73M2
EST. AVERAGE GLUCOSE BLD GHB EST-MCNC: 123 MG/DL
EST. AVERAGE GLUCOSE BLD GHB EST-SCNC: 6.8 MMOL/L
GLUCOSE SERPL-MCNC: 123 MG/DL (ref 65–99)
HBA1C MFR BLD: 5.9 %
MICROALBUMIN UR-MCNC: 2.7 MG/DL
POTASSIUM SERPL-SCNC: 5.1 MMOL/L (ref 3.5–5.3)
PROT SERPL-MCNC: 6.7 G/DL (ref 6.1–8.1)
SODIUM SERPL-SCNC: 139 MMOL/L (ref 135–146)

## 2025-05-21 ENCOUNTER — TELEPHONE (OUTPATIENT)
Dept: ORTHOPEDIC SURGERY | Facility: CLINIC | Age: 65
End: 2025-05-21
Payer: COMMERCIAL

## 2025-05-21 ENCOUNTER — EVALUATION (OUTPATIENT)
Dept: PHYSICAL THERAPY | Facility: CLINIC | Age: 65
End: 2025-05-21
Payer: COMMERCIAL

## 2025-05-21 DIAGNOSIS — Z96.652 HISTORY OF TOTAL LEFT KNEE REPLACEMENT (TKR): Primary | ICD-10-CM

## 2025-05-21 DIAGNOSIS — Z96.652 S/P TOTAL KNEE ARTHROPLASTY, LEFT: ICD-10-CM

## 2025-05-21 DIAGNOSIS — Z96.652 S/P TOTAL KNEE ARTHROPLASTY, LEFT: Primary | ICD-10-CM

## 2025-05-21 PROCEDURE — 97162 PT EVAL MOD COMPLEX 30 MIN: CPT | Mod: GP | Performed by: PHYSICAL THERAPIST

## 2025-05-21 PROCEDURE — 97110 THERAPEUTIC EXERCISES: CPT | Mod: GP | Performed by: PHYSICAL THERAPIST

## 2025-05-21 NOTE — TELEPHONE ENCOUNTER
4/29/25 lt tka-revision  [facility] left message with answering service they need a PT order placed.

## 2025-05-21 NOTE — PROGRESS NOTES
Physical Therapy Evaluation    Patient Name: Doug Encinas  MRN: [id number]  Evaluation Date: 5/21/2025  Time Calculation  Start Time: 0815  Stop Time: 0900  Time Calculation (min): 45 min  PT Evaluation Time Entry  PT Evaluation (Moderate) Time Entry: 20     PT Therapeutic Procedures Time Entry  Therapeutic Exercise Time Entry: 25       Problem List Items Addressed This Visit    None  Visit Diagnoses         Codes      History of total left knee replacement (TKR)    -  Primary Z96.652    Relevant Orders    Follow Up In Physical Therapy      S/P total knee arthroplasty, left     Z96.652              Subjective   General:       Patient reported hx of condition: The patient underwent a L TKR revision with cyst removal.  He had partial knee replacement 13 years ago.  After this surgery he was in an immobilizer due to the extensive cyst removal.  He had home health PT and now presents to outpatient PT for eval and treatment      Surgery:   L TKR 4/29/2025    Precautions:   DM, HTN, cervical disc replacement surgery last year    Relevant PMH:  DM, HTN    Red flags: none    Pain:   1/10  Home Living:       Home type: House  Stairs: Yes  Lives with: Spouse  Occupation: full time job doing works for [employer].  Nothing physical mostly desk  Hobbies/activities: golf    Prior Function Per Pt/Caregiver Report:   I with ADL's, housework, yard work etc  Currently sleeping in a recliner due to surgery    OBJECTIVE:  Objective   Posture:     Range of Motion:     Knee AROM L R   Extension -15 deg WFL   Flexion 65 deg WFL      Strength:     Knee MMT L R   Knee Flexion 3-/5 5/5   Knee Extension 3-/5 5/5      Flexibility:   Tight HS L       Gait:   Ambulates with cane with decreased heel strike and knee extension, and decreased stride length  Balance:   Unable to SLS L  Stairs:   DNT             Outcome Measures:  WOMAC 36/96    Assessment       Pt is a 65 y.o. male who presents with impairments of decreased ROM, strength, balance,  endurance etc. These impairments have led to functional limitations including difficulty ambulating, bending, lifting etc. Pt would benefit from skilled physical therapy intervention to improve above impairments and facilitate return to function.    Complexity of Evaluation: Moderate    Based on the history including personal factors and/or comorbidities, examination of body systems including body structures and function, activity limitations, and/or participation restrictions, as well as clinical presentation, patient meets criteria for above complexity evaluation.    Plan  Treatment/Interventions: Biofeedback, Blood flow restriction therapy, Cryotherapy, Dry needling, Education/ Instruction, Electrical stimulation, Gait training, Hot pack, Manual therapy, Neuromuscular re-education, Self care/ home management, Therapeutic activities, Therapeutic exercises, Ultrasound  PT Plan: Skilled PT  PT Frequency: 2 times per week  Duration: 6 weeks  Certification Period Start Date: 05/21/25  Certification Period End Date: 08/19/25  Number of Treatments Authorized: 12 POC  Rehab Potential: Good  Plan of Care Agreement: Patient    Insurance Plan: Payor: JAIRO / Plan: [facility]THE[facility] / Product Type: *No Product type* /     Plan for next visit: strength, balance, gait and ROM!    OP EDUCATION:       Today's Treatment:    HEP to be completed daily, exercises include:  Saq, qs with heel prop, hip add, bridge, heel slide, AA heel slide    Goals:  1.   The patient to be independent with HEP  2.  The patient to have an improved functional score of less than 10% disability to allow return to PLOF.  3.  Patient will demo increased AROM of affected knee to greater than or equal to 0-120 degrees to normalize gait and decrease difficulty with transfers from low vehicle, etc  4. will improve affected knee strength to 4+ to improve functional activities such as ascending/descending steps, heavy household chores etc

## 2025-05-22 PROBLEM — J06.9 ACUTE UPPER RESPIRATORY INFECTION: Status: ACTIVE | Noted: 2022-03-22

## 2025-05-22 PROBLEM — R73.03 PREDIABETES: Status: ACTIVE | Noted: 2025-05-22

## 2025-05-22 PROBLEM — M17.12 OSTEOARTHRITIS OF LEFT KNEE: Status: ACTIVE | Noted: 2024-12-12

## 2025-05-22 PROBLEM — R51.9 HEADACHE: Status: ACTIVE | Noted: 2020-07-23

## 2025-05-22 PROBLEM — E66.9 OBESITY WITH BODY MASS INDEX 30 OR GREATER: Status: ACTIVE | Noted: 2022-05-05

## 2025-05-22 PROBLEM — D18.01 HEMANGIOMA OF SKIN AND SUBCUTANEOUS TISSUE: Status: ACTIVE | Noted: 2017-05-02

## 2025-05-22 PROBLEM — R07.81 RIB PAIN: Status: ACTIVE | Noted: 2025-05-22

## 2025-05-22 PROBLEM — S61.419A LACERATION OF HAND: Status: ACTIVE | Noted: 2025-05-22

## 2025-05-22 PROBLEM — G89.18 OTHER ACUTE POSTPROCEDURAL PAIN: Status: ACTIVE | Noted: 2024-05-22

## 2025-05-22 PROBLEM — L82.1 OTHER SEBORRHEIC KERATOSIS: Status: ACTIVE | Noted: 2017-05-02

## 2025-05-22 PROBLEM — M50.20 HNP (HERNIATED NUCLEUS PULPOSUS), CERVICAL: Status: ACTIVE | Noted: 2024-05-22

## 2025-05-22 PROBLEM — R10.9 ABDOMINAL PAIN: Status: ACTIVE | Noted: 2025-05-22

## 2025-05-22 PROBLEM — N40.0 BENIGN PROSTATIC HYPERPLASIA WITHOUT URINARY OBSTRUCTION: Status: ACTIVE | Noted: 2022-08-29

## 2025-05-22 PROBLEM — I73.9 PERIPHERAL VASCULAR DISEASE: Status: ACTIVE | Noted: 2022-12-20

## 2025-05-22 PROBLEM — I73.9 CLAUDICATION: Status: ACTIVE | Noted: 2022-12-20

## 2025-05-22 PROBLEM — N40.0 BENIGN PROSTATE HYPERPLASIA: Status: ACTIVE | Noted: 2021-08-08

## 2025-05-22 PROBLEM — R55 SYNCOPE: Status: ACTIVE | Noted: 2023-06-08

## 2025-05-22 PROBLEM — J06.9 ACUTE URI: Status: ACTIVE | Noted: 2022-03-22

## 2025-05-22 PROBLEM — R07.89 CHEST PAIN, ATYPICAL: Status: ACTIVE | Noted: 2020-08-24

## 2025-05-22 PROBLEM — R29.898 UPPER EXTREMITY WEAKNESS: Status: ACTIVE | Noted: 2024-05-22

## 2025-05-22 PROBLEM — E78.00 PURE HYPERCHOLESTEROLEMIA: Status: ACTIVE | Noted: 2019-11-11

## 2025-05-22 PROBLEM — M75.110 PARTIAL THICKNESS ROTATOR CUFF TEAR: Status: ACTIVE | Noted: 2023-07-13

## 2025-05-22 PROBLEM — M54.16 LUMBAR RADICULOPATHY: Status: ACTIVE | Noted: 2023-07-13

## 2025-05-22 PROBLEM — M79.601 RIGHT ARM PAIN: Status: ACTIVE | Noted: 2021-12-22

## 2025-05-22 PROBLEM — J98.01 BRONCHOSPASM: Status: ACTIVE | Noted: 2025-05-22

## 2025-05-22 PROBLEM — R73.09 ABNORMAL BLOOD SUGAR: Status: ACTIVE | Noted: 2019-12-05

## 2025-05-22 PROBLEM — R19.5 POSITIVE FECAL OCCULT BLOOD TEST: Status: ACTIVE | Noted: 2019-12-09

## 2025-05-22 PROBLEM — L81.4 OTHER MELANIN HYPERPIGMENTATION: Status: ACTIVE | Noted: 2017-05-02

## 2025-05-22 PROBLEM — I10 BENIGN ESSENTIAL HTN: Status: ACTIVE | Noted: 2020-08-24

## 2025-05-22 PROBLEM — M19.019 ACROMIOCLAVICULAR JOINT ARTHRITIS: Status: ACTIVE | Noted: 2023-08-09

## 2025-05-22 PROBLEM — A08.8: Status: ACTIVE | Noted: 2021-10-04

## 2025-05-22 PROBLEM — M12.9 ARTHROPATHY: Status: ACTIVE | Noted: 2025-05-22

## 2025-05-22 PROBLEM — R05.9 COUGH: Status: ACTIVE | Noted: 2022-03-22

## 2025-05-22 PROBLEM — D18.03 HEMANGIOMA OF LIVER: Status: ACTIVE | Noted: 2020-12-21

## 2025-05-22 PROBLEM — L73.9 FOLLICULITIS: Status: ACTIVE | Noted: 2022-08-29

## 2025-05-22 PROBLEM — M54.50 LOW BACK PAIN: Status: ACTIVE | Noted: 2020-11-19

## 2025-05-23 ENCOUNTER — TELEPHONE (OUTPATIENT)
Dept: PRIMARY CARE | Facility: CLINIC | Age: 65
End: 2025-05-23

## 2025-05-23 ENCOUNTER — TREATMENT (OUTPATIENT)
Dept: PHYSICAL THERAPY | Facility: CLINIC | Age: 65
End: 2025-05-23
Payer: COMMERCIAL

## 2025-05-23 ENCOUNTER — APPOINTMENT (OUTPATIENT)
Dept: PRIMARY CARE | Facility: CLINIC | Age: 65
End: 2025-05-23
Payer: COMMERCIAL

## 2025-05-23 VITALS
DIASTOLIC BLOOD PRESSURE: 84 MMHG | TEMPERATURE: 97.5 F | OXYGEN SATURATION: 95 % | HEIGHT: 65 IN | BODY MASS INDEX: 28.82 KG/M2 | SYSTOLIC BLOOD PRESSURE: 140 MMHG | WEIGHT: 173 LBS | HEART RATE: 85 BPM

## 2025-05-23 DIAGNOSIS — Z96.652 HISTORY OF TOTAL LEFT KNEE REPLACEMENT (TKR): ICD-10-CM

## 2025-05-23 DIAGNOSIS — R19.7 DIARRHEA OF PRESUMED INFECTIOUS ORIGIN: ICD-10-CM

## 2025-05-23 DIAGNOSIS — I10 ESSENTIAL HYPERTENSION, BENIGN: ICD-10-CM

## 2025-05-23 DIAGNOSIS — E11.9 TYPE 2 DIABETES MELLITUS WITHOUT COMPLICATION, UNSPECIFIED WHETHER LONG TERM INSULIN USE: Primary | ICD-10-CM

## 2025-05-23 DIAGNOSIS — E11.9 TYPE 2 DIABETES MELLITUS WITHOUT COMPLICATION, UNSPECIFIED WHETHER LONG TERM INSULIN USE: ICD-10-CM

## 2025-05-23 PROCEDURE — 3079F DIAST BP 80-89 MM HG: CPT | Performed by: FAMILY MEDICINE

## 2025-05-23 PROCEDURE — 99214 OFFICE O/P EST MOD 30 MIN: CPT | Performed by: FAMILY MEDICINE

## 2025-05-23 PROCEDURE — 97110 THERAPEUTIC EXERCISES: CPT | Mod: GP | Performed by: PHYSICAL THERAPIST

## 2025-05-23 PROCEDURE — 1036F TOBACCO NON-USER: CPT | Performed by: FAMILY MEDICINE

## 2025-05-23 PROCEDURE — 3077F SYST BP >= 140 MM HG: CPT | Performed by: FAMILY MEDICINE

## 2025-05-23 PROCEDURE — 3008F BODY MASS INDEX DOCD: CPT | Performed by: FAMILY MEDICINE

## 2025-05-23 PROCEDURE — 4010F ACE/ARB THERAPY RXD/TAKEN: CPT | Performed by: FAMILY MEDICINE

## 2025-05-23 ASSESSMENT — PAIN SCALES - GENERAL: PAINLEVEL_OUTOF10: 0-NO PAIN

## 2025-05-23 NOTE — PROGRESS NOTES
"Subjective   Patient ID: Doug Encinas is a 65 y.o. male who presents for Diabetes (/Labs and A1C done 5/17/2025/Eye exam done 4/27/2025 per patient.  Goes yearly).    HPI here for multiple issues.  Patient is a few weeks status post left total knee.  He is doing quite well.  At patient's last office visit he had diarrhea which spontaneously resolved before he went in for surgery.  Patient has diabetes.  He had been on Mounjaro 5 mg weekly but he discontinued it.  He is still on metformin  mg twice daily.  He has noticed that he has gained about 6 pounds in the past month or so.  He admits to straining weight from a strict diet.  Patient has hypertension.  He is stable on lisinopril 20 mg daily.    Review of Systems  Constitutional: Patient is negative for fever, fatigue, weight change.  HEENT: Patient is negative for change in vision, hearing, swallow.  Cardio: Patient is negative for chest pain, lower extremity edema.  Pulmonary: Patient is negative for cough, shortness of breath.    Objective   /84 (BP Location: Left arm, Patient Position: Sitting)   Pulse 85   Temp 36.4 °C (97.5 °F)   Ht 1.651 m (5' 5\")   Wt 78.5 kg (173 lb)   SpO2 95%   BMI 28.79 kg/m²     Physical Exam  General: Awake and alert no apparent distress.  HEENT: Moist oral mucosa no cervical lymphadenopathy.  Cardio: Heart S1-S2 no murmur rub or gallop.  Pulmonary: Lungs clear to auscultation bilaterally.  Assessment/Plan   Problem List Items Addressed This Visit           ICD-10-CM    Type 2 diabetes mellitus without complication - Primary stable.  Current hemoglobin A1c is 5.9.  Patient will stay off of Mounjaro.  Continue on metformin.  He will try to watch his diet.  Will follow-up in 3 months for recheck. E11.9    Relevant Orders    Follow Up In Primary Care - Established     Other Visit Diagnoses         Codes      Diarrhea of presumed infectious origin    resolved. R19.7      Essential hypertension, benign    stable.  " Continue on lisinopril 20 mg daily.  Patient will follow-up in 3 months. I10

## 2025-05-23 NOTE — PROGRESS NOTES
"Physical Therapy Treatment    Patient Name: Doug Encinas  MRN: [id number]  Encounter date:  5/23/2025  Time Calculation  Start Time: 0800  Stop Time: 0848  Time Calculation (min): 48 min     PT Therapeutic Procedures Time Entry  Therapeutic Exercise Time Entry: 44    Visit Number:  2 (including evaluation)  Planned total visits: 12  Visit Authorized:  30 INS    Current Problem  1. History of total left knee replacement (TKR)  Follow Up In Physical Therapy          Surgery  L TKR      Precautions    DM, HTN, cervical disc replacement surgery last year     Pain   1/10    Subjective  General        The patient reports good tolerance to the initial eval and compliance with HEP    Objective  Mild antalgic gait with decreased knee extension and cane      Treatment:    Daquan:  Nustep x5'  Moflex calf stretch 3x30\"  Eccentric heel raise 2x10  6\" step up x10    1# 2x10:  Stand slr  Stand abd  Stand hs curl  Stand hip ext  Laq   Saq    2x10:  Quad set with heel prop  Supine SLR  Double heel slide with ball  AA heel slide with strap    Patellar mobs with home instruction        Assessment:     Pt's response to treatment:    The patient tolerated today's initial PT treatment well and states that he feels better after and will ice when he gets home.  Instructed him in patella mobs to perform via HEP    Pain end of session:  better    Plan:     Continue with current POC/no changes    Assessment of current progress against goals:  Insufficient treatment time to assess progress    Goals:    1.   The patient to be independent with HEP  2.  The patient to have an improved functional score of less than 10% disability to allow return to PLOF.  3.  Patient will demo increased AROM of affected knee to greater than or equal to 0-120 degrees to normalize gait and decrease difficulty with transfers from low vehicle, etc  4. will improve affected knee strength to 4+ to improve functional activities such as ascending/descending steps, heavy " household chores etc

## 2025-05-28 ENCOUNTER — TREATMENT (OUTPATIENT)
Dept: PHYSICAL THERAPY | Facility: CLINIC | Age: 65
End: 2025-05-28
Payer: COMMERCIAL

## 2025-05-28 DIAGNOSIS — Z96.652 HISTORY OF TOTAL LEFT KNEE REPLACEMENT (TKR): ICD-10-CM

## 2025-05-28 PROCEDURE — 97110 THERAPEUTIC EXERCISES: CPT | Mod: GP,CQ

## 2025-05-28 NOTE — PROGRESS NOTES
"Physical Therapy Treatment    Patient Name: Doug Encinas  MRN: [id number]  Encounter date:  5/28/2025  Time Calculation  Start Time: 0815  Stop Time: 0900  Time Calculation (min): 45 min     PT Therapeutic Procedures Time Entry  Manual Therapy Time Entry: 5  Therapeutic Exercise Time Entry: 35    Visit Number:  3 (including evaluation)  Planned total visits: 12  Visit Authorized:  30 INS    Current Problem  1. History of total left knee replacement (TKR)  Follow Up In Physical Therapy          Surgery  L TKR    Precautions    DM, HTN, cervical disc replacement surgery last year     Pain   1/10    Subjective  General   HEP going well per patient       Objective  -7 degrees ext   94 degrees flexion   Warmth palpated but appropriate.  No signs of infection  Steri strips intact     Treatment:  Therapeutic exercises   Daquan:  - Nustep x5'  - *LAQ seated on air ex 2x10 R/L   - *Standing HS curl 2x10 R/L   - *Seated hamstring stretch 20 sec x 2 R/L   - *Standing calf stretch 20 sec x 2 R/L   *added to HEP    Pynth  Heel prop with QS 5\" 2x10     Manual   Patellar mobs L -slightly less inferior and superior mobility  Warmth palpated but appropriate.  No signs of infection  Steri strips intact   Measured left knee ROM     Therapeutic exercises   Heel slides with board x 10   Heel slides with board and strap for OP 2 x 10   QS 5 sec x 10   SLR 2x10 R/L - cues to relax ankles - mild lag noted with LLE   Double heel slide with ball 2x 10   Single knee heel slide with ball LLE 2x10     HEP   Access Code: [email or web address]  URL: [email or web address]  Date: 05/28/2025  Prepared by: Yasmine Sands    Exercises  - Supine Quadricep Sets  - 1 x daily - 5 x weekly - 2 sets - 10 reps  - Supine Heel Slide with Strap  - 1 x daily - 5 x weekly - 2 sets - 10 reps  - Standing Hip Abduction with Counter Support  - 1 x daily - 5 x weekly - 2 sets - 10 reps  - Standing Hip Flexion with Counter Support  - 1 x daily - 5 x weekly - 2 sets - 10 reps  - " "Standing Hip Extension with Counter Support  - 1 x daily - 5 x weekly - 2 sets - 10 reps  - Heel Toe Raises with Counter Support  - 1 x daily - 5 x weekly - 2 sets - 10 reps  - Seated Long Arc Quad  - 1 x daily - 5 x weekly - 2 sets - 10 reps  - Standing Knee Flexion with Unilateral Counter Support  - 1 x daily - 5 x weekly - 2 sets - 10 reps  - Seated Hamstring Stretch  - 1 x daily - 5 x weekly - 1 sets - 3 reps - 20-30 sec  hold  - Gastroc Stretch on Wall  - 1 x daily - 5 x weekly - 1 sets - 3 reps - 20-30 sec hold      Assessment:   HEP updated and instructed. Handouts issued.  Warmth palpated with manual but appropriate. No signs of infection at this time. Steri strips intact. Mild deficits in patella mobility with inferior and superior movements.  -7 degrees left knee ext and 94 degrees flexion measured this date.     Pain end of session:  \"Same\"    Plan:     Continue with current POC/no changes    Assessment of current progress against goals:  Insufficient treatment time to assess progress    Goals:    1.   The patient to be independent with HEP  2.  The patient to have an improved functional score of less than 10% disability to allow return to PLOF.  3.  Patient will demo increased AROM of affected knee to greater than or equal to 0-120 degrees to normalize gait and decrease difficulty with transfers from low vehicle, etc  4. will improve affected knee strength to 4+ to improve functional activities such as ascending/descending steps, heavy household chores etc           "

## 2025-05-30 ENCOUNTER — TREATMENT (OUTPATIENT)
Dept: PHYSICAL THERAPY | Facility: CLINIC | Age: 65
End: 2025-05-30
Payer: COMMERCIAL

## 2025-05-30 DIAGNOSIS — Z96.652 HISTORY OF TOTAL LEFT KNEE REPLACEMENT (TKR): ICD-10-CM

## 2025-05-30 PROCEDURE — 97110 THERAPEUTIC EXERCISES: CPT | Mod: GP,CQ

## 2025-05-30 PROCEDURE — 97112 NEUROMUSCULAR REEDUCATION: CPT | Mod: GP,CQ

## 2025-05-30 NOTE — PROGRESS NOTES
"Physical Therapy Treatment    Patient Name: Doug Encinas  MRN: [id number]  Encounter date:  5/30/2025  Time Calculation  Start Time: 0815  Stop Time: 0900  Time Calculation (min): 45 min     PT Therapeutic Procedures Time Entry  Manual Therapy Time Entry: 5  Neuromuscular Re-Education Time Entry: 10  Therapeutic Exercise Time Entry: 25    Visit Number:  4 (including evaluation)  Planned total visits: 12  Visit Authorized:  30 INS    Current Problem  1. History of total left knee replacement (TKR)  Follow Up In Physical Therapy        Follow up 6/11 with ortho     Surgery  L TKR 4/29/2025   4 weeks post op 5/30    Precautions    DM, HTN, cervical disc replacement surgery last year     Pain   0/10     Subjective  General   \"I feel a little sore after PT but then I feel good.\"   \"I could not sleep last night. I had to go out to the living room and rub it.\"      Objective  -4 degrees ext   97 degrees flexion   Warmth palpated but appropriate.  No signs of infection  Steri strips beginning to sluff off   Treatment:  Therapeutic exercises   Daquan:  - Nustep x5'  - Heels raises 2x10   - 2:1 eccentric heel x10 R/L   - Moflex calf stretch 20 sec x 2 R/L     Neuro re-ed   Air ex pad   Step fwd and lateral x 10 ea   Tandem 20 sec x 2 R/L    Therapeutic exercises   supine  Heel prop with QS 5\" x10     Manual   supine  Patellar mobs L -slightly less inferior and superior mobility    Therapeutic exercises   supine  Heel slides hovering x 10   Heel slides with strap for OP 3 sec x 10  Heel slides with therapy OP 5 sec x 5   Heel prop with QS 5\" x10   Heel slides with therapy OP 5 sec x 5   Measured knee ROM   Standing   LLE on step with body weight OP into flexion and extension x 5 ea   Recumbent bike   Rocking with pause at end range 5 min        HEP   Access Code: [email or web address]  URL: [email or web address]  Date: 05/28/2025  Prepared by: Yasmine Sands    Exercises  - Supine Quadricep Sets  - 1 x daily - 5 x weekly - 2 sets - 10 " "reps  - Supine Heel Slide with Strap  - 1 x daily - 5 x weekly - 2 sets - 10 reps  - Standing Hip Abduction with Counter Support  - 1 x daily - 5 x weekly - 2 sets - 10 reps  - Standing Hip Flexion with Counter Support  - 1 x daily - 5 x weekly - 2 sets - 10 reps  - Standing Hip Extension with Counter Support  - 1 x daily - 5 x weekly - 2 sets - 10 reps  - Heel Toe Raises with Counter Support  - 1 x daily - 5 x weekly - 2 sets - 10 reps  - Seated Long Arc Quad  - 1 x daily - 5 x weekly - 2 sets - 10 reps  - Standing Knee Flexion with Unilateral Counter Support  - 1 x daily - 5 x weekly - 2 sets - 10 reps  - Seated Hamstring Stretch  - 1 x daily - 5 x weekly - 1 sets - 3 reps - 20-30 sec  hold  - Gastroc Stretch on Wall  - 1 x daily - 5 x weekly - 1 sets - 3 reps - 20-30 sec hold    Has patient been compliant with HEP? Yes       Assessment:  The patient gained a few degrees of extension and flexion     Pain end of session:  \"1-1.5\"    Plan:     Continue with current POC/no changes    Assessment of current progress against goals:  Insufficient treatment time to assess progress    Goals:    1.   The patient to be independent with HEP  2.  The patient to have an improved functional score of less than 10% disability to allow return to PLOF.  3.  Patient will demo increased AROM of affected knee to greater than or equal to 0-120 degrees to normalize gait and decrease difficulty with transfers from low vehicle, etc  4. will improve affected knee strength to 4+ to improve functional activities such as ascending/descending steps, heavy household chores etc           "

## 2025-06-02 ENCOUNTER — TREATMENT (OUTPATIENT)
Dept: PHYSICAL THERAPY | Facility: CLINIC | Age: 65
End: 2025-06-02
Payer: COMMERCIAL

## 2025-06-02 DIAGNOSIS — Z96.652 HISTORY OF TOTAL LEFT KNEE REPLACEMENT (TKR): ICD-10-CM

## 2025-06-02 PROCEDURE — 97140 MANUAL THERAPY 1/> REGIONS: CPT | Mod: GP,CQ

## 2025-06-02 PROCEDURE — 97112 NEUROMUSCULAR REEDUCATION: CPT | Mod: GP,CQ

## 2025-06-02 PROCEDURE — 97110 THERAPEUTIC EXERCISES: CPT | Mod: GP,CQ

## 2025-06-02 NOTE — PROGRESS NOTES
"Physical Therapy Treatment    Patient Name: Doug Encinas  MRN: [id number]  Encounter date:  6/2/2025  Time Calculation  Start Time: 0900  Stop Time: 0945  Time Calculation (min): 45 min     PT Therapeutic Procedures Time Entry  Manual Therapy Time Entry: 10  Neuromuscular Re-Education Time Entry: 15  Therapeutic Exercise Time Entry: 15    Visit Number:  5 (including evaluation)  Planned total visits: 12  Visit Authorized:  30 INS    Current Problem  1. History of total left knee replacement (TKR)  Follow Up In Physical Therapy        Follow up 6/11 with ortho     Surgery  L TKR 4/29/2025   4 weeks post op 6/2    Precautions    DM, HTN, cervical disc replacement surgery last year     Pain   0/10    Subjective  General  \"If I walk at the grocery store or in the yard, it begins to hurt.\"        Objective  Fair + balance with neuro re-ed     Treatment:  Therapeutic exercises   Daquan:  - Nustep x 5' level 2   - Toe raises x20  - Heels raises x10   -  2:1 eccentric heel 2x10 R/L   -  Moflex calf stretch 20 sec x 2 R/L   - Tibialis anterior stretch 20 sec x 2 R/L     Neuro re-ed   Air ex plank 6 ft  - lateral 3 laps -hover   - bkwd 3 laps -light   - tandem 3 laps  - PRN  Air ex pad   -Step fwd and lateral 3 sec hold x 10 ea    Recumbent bike for ROM   Rocking with pause at end range 5 min      Therapeutic exercises   supine  Heel prop with QS 5\" x10     Manual   supine  Patellar mobs L -slightly less inferior and superior mobility  STM distal ITB     Therapeutic exercises   Side lying   -clam shells level 1 and 2  x10 R/L   -hip abduction x 5 -with tactile cues x 10 R     HEP   Access Code: [email or web address]  URL: [email or web address]  Date: 05/28/2025  Prepared by: Yasmine Sands    Exercises  - Supine Quadricep Sets  - 1 x daily - 5 x weekly - 2 sets - 10 reps  - Supine Heel Slide with Strap  - 1 x daily - 5 x weekly - 2 sets - 10 reps  - Standing Hip Abduction with Counter Support  - 1 x daily - 5 x weekly - 2 sets - 10 reps  - " Standing Hip Flexion with Counter Support  - 1 x daily - 5 x weekly - 2 sets - 10 reps  - Standing Hip Extension with Counter Support  - 1 x daily - 5 x weekly - 2 sets - 10 reps  - Heel Toe Raises with Counter Support  - 1 x daily - 5 x weekly - 2 sets - 10 reps  - Seated Long Arc Quad  - 1 x daily - 5 x weekly - 2 sets - 10 reps  - Standing Knee Flexion with Unilateral Counter Support  - 1 x daily - 5 x weekly - 2 sets - 10 reps  - Seated Hamstring Stretch  - 1 x daily - 5 x weekly - 1 sets - 3 reps - 20-30 sec  hold  - Gastroc Stretch on Wall  - 1 x daily - 5 x weekly - 1 sets - 3 reps - 20-30 sec hold    Self flexion and ext ROM on step     Has patient been compliant with HEP? Yes       Assessment:  Focus on stability, strengthening and balance. Able to replicate feeling of fatigue with walking.     Pain end of session:  0    Plan:     Continue with current POC/no changes    Assessment of current progress against goals:  Insufficient treatment time to assess progress    Goals:    1.   The patient to be independent with HEP  2.  The patient to have an improved functional score of less than 10% disability to allow return to PLOF.  3.  Patient will demo increased AROM of affected knee to greater than or equal to 0-120 degrees to normalize gait and decrease difficulty with transfers from low vehicle, etc  4. will improve affected knee strength to 4+ to improve functional activities such as ascending/descending steps, heavy household chores etc

## 2025-06-03 ENCOUNTER — DOCUMENTATION (OUTPATIENT)
Dept: PHYSICAL THERAPY | Facility: CLINIC | Age: 65
End: 2025-06-03
Payer: COMMERCIAL

## 2025-06-06 ENCOUNTER — TREATMENT (OUTPATIENT)
Dept: PHYSICAL THERAPY | Facility: CLINIC | Age: 65
End: 2025-06-06
Payer: COMMERCIAL

## 2025-06-06 DIAGNOSIS — Z96.652 HISTORY OF TOTAL LEFT KNEE REPLACEMENT (TKR): ICD-10-CM

## 2025-06-06 PROCEDURE — 97110 THERAPEUTIC EXERCISES: CPT | Mod: GP,CQ

## 2025-06-06 PROCEDURE — 97140 MANUAL THERAPY 1/> REGIONS: CPT | Mod: GP,CQ

## 2025-06-06 NOTE — PROGRESS NOTES
"Physical Therapy Treatment    Patient Name: Doug Encinas  MRN: [id number]  Encounter date:  6/6/2025  Time Calculation  Start Time: 0815  Stop Time: 0900  Time Calculation (min): 45 min     PT Therapeutic Procedures Time Entry  Manual Therapy Time Entry: 15  Therapeutic Exercise Time Entry: 25    Visit Number:  6 (including evaluation)  Planned total visits: 12  Visit Authorized:  30 INS    Current Problem  1. History of total left knee replacement (TKR)  Follow Up In Physical Therapy        Follow up 6/11 with ortho     Surgery  L TKR 4/29/2025   4 weeks post op 6/2    Precautions    DM, HTN, cervical disc replacement surgery last year     Pain   0/10    Subjective  General  \"I am getting sharp pains at night.\"        Objective  100 degrees flexion   -3 degrees ext    Semimembranous TTP  Semitendinous TTP     Treatment:  Therapeutic exercises   Daquan:  - Nustep x 5' level 2 without UE   -  Moflex calf stretch 30 sec x 3 R/L   - Heel Toe rocks x20    -  2:1 eccentric heel 2x10 R/L   -  Moflex calf stretch 20 sec x 2 R/L   - Tibialis anterior stretch 20 sec x 2 R/L   - TKE harinder blue Tb x10 3 sec hold     Neuro re-ed   Air ex plank 6 ft  - lateral 3 laps -hover   - bkwd 3 laps -light   - tandem 3 laps  - PRN  Air ex pad   -Step fwd and lateral 3 sec hold x 10 ea    Recumbent bike for ROM - seat 14   Rocking with pause at end range 5 min      Therapeutic exercises   Supine  Heel slide x 10   Heel slides with OP using strap x10 3 sec   Heel prop with QS 5\" x10     Manual   supine  Patellar mobs L -slightly less inferior and superior mobility  STM L knee  Tibial mobs AP    Therapeutic exercises   Side lying   -clam shells level 1 and 2  x10 R/L   -hip abduction x 5 -with tactile cues x 10 R     HEP   Access Code: [email or web address]  URL: [email or web address]  Date: 05/28/2025  Prepared by: Yasmine Sands    Exercises  - Supine Quadricep Sets  - 1 x daily - 5 x weekly - 2 sets - 10 reps  - Supine Heel Slide with Strap  - 1 x daily - " 5 x weekly - 2 sets - 10 reps  - Standing Hip Abduction with Counter Support  - 1 x daily - 5 x weekly - 2 sets - 10 reps  - Standing Hip Flexion with Counter Support  - 1 x daily - 5 x weekly - 2 sets - 10 reps  - Standing Hip Extension with Counter Support  - 1 x daily - 5 x weekly - 2 sets - 10 reps  - Heel Toe Raises with Counter Support  - 1 x daily - 5 x weekly - 2 sets - 10 reps  - Seated Long Arc Quad  - 1 x daily - 5 x weekly - 2 sets - 10 reps  - Standing Knee Flexion with Unilateral Counter Support  - 1 x daily - 5 x weekly - 2 sets - 10 reps  - Seated Hamstring Stretch  - 1 x daily - 5 x weekly - 1 sets - 3 reps - 20-30 sec  hold  - Gastroc Stretch on Wall  - 1 x daily - 5 x weekly - 1 sets - 3 reps - 20-30 sec hold    Self flexion and ext ROM on step     Has patient been compliant with HEP? Yes     Assessment:  A few degrees of flexion gained post exercises and manual in supine. Medial, posterior tender with palpation.     Pain end of session:  1/10    Plan:     Continue with current POC/no changes    Assessment of current progress against goals:  Insufficient treatment time to assess progress    Goals:    1.   The patient to be independent with HEP  2.  The patient to have an improved functional score of less than 10% disability to allow return to PLOF.  3.  Patient will demo increased AROM of affected knee to greater than or equal to 0-120 degrees to normalize gait and decrease difficulty with transfers from low vehicle, etc  4. will improve affected knee strength to 4+ to improve functional activities such as ascending/descending steps, heavy household chores etc

## 2025-06-09 ENCOUNTER — TREATMENT (OUTPATIENT)
Dept: PHYSICAL THERAPY | Facility: CLINIC | Age: 65
End: 2025-06-09
Payer: COMMERCIAL

## 2025-06-09 DIAGNOSIS — Z96.652 HISTORY OF TOTAL LEFT KNEE REPLACEMENT (TKR): ICD-10-CM

## 2025-06-09 PROCEDURE — 97110 THERAPEUTIC EXERCISES: CPT | Mod: GP,CQ

## 2025-06-09 PROCEDURE — 97140 MANUAL THERAPY 1/> REGIONS: CPT | Mod: GP,CQ

## 2025-06-09 NOTE — PROGRESS NOTES
"Physical Therapy Treatment    Patient Name: Doug Encinas  MRN: [id number]  Encounter date:  6/9/2025  Time Calculation  Start Time: 0900  Stop Time: 0945  Time Calculation (min): 45 min     PT Therapeutic Procedures Time Entry  Manual Therapy Time Entry: 18  Therapeutic Exercise Time Entry: 23    Visit Number:  7 (including evaluation)  Planned total visits: 12  Visit Authorized:  30 INS    Current Problem  1. History of total left knee replacement (TKR)  Follow Up In Physical Therapy        Follow up 6/11 with ortho     Surgery  L TKR 4/29/2025   4 weeks post op 6/2    Precautions    DM, HTN, cervical disc replacement surgery last year     Pain   1/10     Subjective  General   \"I am still not sleeping well. I am still getting the sharp pain.\"   \"I am also getting them randomly during the day.\"      Objective  Myofascial restriction hamstring medial     Treatment:  Therapeutic exercises   Daquan:  - Nustep x 5' level 2 without UE     Manual L   Prone   IASTM hamstring and calf   supine  Patellar mobs L -slightly less inferior and superior mobility  IASTM quad   DTM/Myofacial L hamstring with focus medial   Tibial mobs AP  Femoral IR mobs     Therapeutic exercises   -  Moflex calf stretch 30 sec x 3 R/L   - Heel 2x10   -Toe rocks 2x10   -runner calf 20 sec x 2 R/L   -6\" step up x10   Fwd  Lateral   L foot on step with knee over toe and rock back into ext 10 sec hold on end range x 10     DNP 6/9   Recumbent bike for ROM - seat 14   Rocking with pause at end range 5 min    -  2:1 eccentric heel 2x10 R/L   -  Moflex calf stretch 20 sec x 2 R/L   - Tibialis anterior stretch 20 sec x 2 R/L   - TKE harinder blue Tb x10 3 sec hold     Neuro re-ed DNP 6/9   Air ex plank 6 ft  - lateral 3 laps -hover   - bkwd 3 laps -light   - tandem 3 laps  - PRN  Air ex pad   -Step fwd and lateral 3 sec hold x 10 ea    Therapeutic exercises DNP 6/9   Supine  Heel slide x 10   Heel slides with OP using strap x10 3 sec   Heel prop with QS 5\" x10 " "    Therapeutic exercises DNP 6/9   Side lying   -clam shells level 1 and 2  x10 R/L   -hip abduction x 5 -with tactile cues x 10 R     HEP   Access Code: [email or web address]  URL: [email or web address]  Date: 05/28/2025  Prepared by: Yasmine Sands    Exercises  - Supine Quadricep Sets  - 1 x daily - 5 x weekly - 2 sets - 10 reps  - Supine Heel Slide with Strap  - 1 x daily - 5 x weekly - 2 sets - 10 reps  - Standing Hip Abduction with Counter Support  - 1 x daily - 5 x weekly - 2 sets - 10 reps  - Standing Hip Flexion with Counter Support  - 1 x daily - 5 x weekly - 2 sets - 10 reps  - Standing Hip Extension with Counter Support  - 1 x daily - 5 x weekly - 2 sets - 10 reps  - Heel Toe Raises with Counter Support  - 1 x daily - 5 x weekly - 2 sets - 10 reps  - Seated Long Arc Quad  - 1 x daily - 5 x weekly - 2 sets - 10 reps  - Standing Knee Flexion with Unilateral Counter Support  - 1 x daily - 5 x weekly - 2 sets - 10 reps  - Seated Hamstring Stretch  - 1 x daily - 5 x weekly - 1 sets - 3 reps - 20-30 sec  hold  - Gastroc Stretch on Wall  - 1 x daily - 5 x weekly - 1 sets - 3 reps - 20-30 sec hold    Self flexion and ext ROM on step     Has patient been compliant with HEP? Yes     Assessment:  Patient c/o remains sharp pain at night and intermittently during the day. Myofascial restriction palpated with IASTM and tight medial hamstring tendons with DTM/Myofascial release.       Pain end of session:  \"Feels better than when I came in.\"     Plan:     Continue with current POC/no changes    Assessment of current progress against goals:  Insufficient treatment time to assess progress    Goals:    1.   The patient to be independent with HEP  2.  The patient to have an improved functional score of less than 10% disability to allow return to PLOF.  3.  Patient will demo increased AROM of affected knee to greater than or equal to 0-120 degrees to normalize gait and decrease difficulty with transfers from low vehicle, etc  4. " will improve affected knee strength to 4+ to improve functional activities such as ascending/descending steps, heavy household chores etc

## 2025-06-11 ENCOUNTER — HOSPITAL ENCOUNTER (OUTPATIENT)
Dept: RADIOLOGY | Facility: CLINIC | Age: 65
Discharge: HOME | End: 2025-06-11
Payer: COMMERCIAL

## 2025-06-11 ENCOUNTER — APPOINTMENT (OUTPATIENT)
Dept: ORTHOPEDIC SURGERY | Facility: CLINIC | Age: 65
End: 2025-06-11
Payer: COMMERCIAL

## 2025-06-11 DIAGNOSIS — Z96.652 S/P TOTAL KNEE ARTHROPLASTY, LEFT: ICD-10-CM

## 2025-06-11 PROCEDURE — 73564 X-RAY EXAM KNEE 4 OR MORE: CPT | Mod: LT

## 2025-06-11 PROCEDURE — 99024 POSTOP FOLLOW-UP VISIT: CPT

## 2025-06-11 PROCEDURE — 73562 X-RAY EXAM OF KNEE 3: CPT | Mod: RT

## 2025-06-11 PROCEDURE — 4010F ACE/ARB THERAPY RXD/TAKEN: CPT

## 2025-06-11 RX ORDER — METHYLPREDNISOLONE 4 MG/1
4 TABLET ORAL ONCE
Qty: 21 TABLET | Refills: 0 | Status: SHIPPED | OUTPATIENT
Start: 2025-06-11 | End: 2025-06-11

## 2025-06-11 ASSESSMENT — PAIN - FUNCTIONAL ASSESSMENT: PAIN_FUNCTIONAL_ASSESSMENT: 0-10

## 2025-06-11 ASSESSMENT — PAIN SCALES - GENERAL: PAINLEVEL_OUTOF10: 1

## 2025-06-11 NOTE — PROGRESS NOTES
Doing great some pains at night tight and swollen knee gave medrol and told him to take it easy   Chief Complaint   Patient presents with    Left Knee - Post-op     04/26/25 LT TKA REV.           This is a 65 y.o. male who is 6 weeks out from revision left total knee.  No drainage from his incision no fevers or chills.  Progressing well with physical therapy and appropriately improving in function.  Patient states that he is having some difficulty sleeping at night due to pain and swelling.  Patient states that he would like to be bending his knee more but feels like he is limited by the swelling.  No other new issues or symptoms.    Left knee examination: Surgical incision well healed no erythema no drainage.  Stable to varus valgus stress range of motion 5 to 95 degrees extension flexion.  Neurovascular tact distally    X-rays of the knee were reviewed independently interpreted by me today, the show stable total knee arthroplasty no fracture dislocation or loosening    Impression plan: 65 y.o. male 6 weeks out from left total knee revision.  We discussed continuing physical therapy and home exercise program. Pain medications to be used as needed. Assistive devices as needed per PT. We discussed DVT prophylaxis until 6 weeks. No dentist until 3 months and thereafter dental prophylaxis for life. Activity as tolerated weightbearing as tolerated. I have personally reviewed the OARRS report for the patient. This report is scanned into the electronic medical record. I have considered the risks of abuse, dependence, addiction and diversion. Follow up 3 months with xrays.  Told the patient is doing great with his recovery so far and I gave him a order for Medrol Dosepak that he take to help decrease the swelling.  I told him to take it easy for a few days and not work on deep flexion exercises and then take the steroid.  Once the medication has been taking for a little bit he can get back to deep flexion and working on  exercises.

## 2025-06-12 ENCOUNTER — TELEPHONE (OUTPATIENT)
Dept: ORTHOPEDIC SURGERY | Facility: CLINIC | Age: 65
End: 2025-06-12
Payer: COMMERCIAL

## 2025-06-12 ENCOUNTER — TREATMENT (OUTPATIENT)
Dept: PHYSICAL THERAPY | Facility: CLINIC | Age: 65
End: 2025-06-12
Payer: COMMERCIAL

## 2025-06-12 DIAGNOSIS — Z96.652 HISTORY OF TOTAL LEFT KNEE REPLACEMENT (TKR): ICD-10-CM

## 2025-06-12 PROCEDURE — 97140 MANUAL THERAPY 1/> REGIONS: CPT | Mod: GP | Performed by: PHYSICAL THERAPIST

## 2025-06-12 PROCEDURE — 97110 THERAPEUTIC EXERCISES: CPT | Mod: GP | Performed by: PHYSICAL THERAPIST

## 2025-06-12 NOTE — PROGRESS NOTES
"Physical Therapy Treatment    Patient Name: Doug Encinas  MRN: [id number]  Encounter date:  6/12/2025  Time Calculation  Start Time: 0900  Stop Time: 0945  Time Calculation (min): 45 min     PT Therapeutic Procedures Time Entry  Therapeutic Exercise Time Entry: 24  Manual Therapy Time Entry: 20    Visit Number:  8 (including evaluation)  Planned total visits: 12  Visit Authorized:  30 INS    Current Problem  1. History of total left knee replacement (TKR)  Follow Up In Physical Therapy          Follow up 6/11 with ortho     Surgery  L TKR 4/29/2025   5 weeks post op 6/9    Precautions    DM, HTN, cervical disc replacement surgery last year     Pain   1/10     Subjective  General  Pt saw the doctor yesterday - he said everything is doing very well.  However, he has pain in his thigh just above the knee, he did not do anything different, actually yesterday was a rest day, so is surprised that it is bothering him.      Objective  Myofascial restriction hamstring medial     Treatment:  Therapeutic exercises   Daquan:  - Nustep x 5' level 2 without UE   Heel prop  Tball knee flexion   QS and SLR  Manual L     Patellar mobs L -slightly less inferior and superior mobility      Therapeutic exercises   -  Moflex calf stretch 30 sec x 3 R/L   - Heel 2x10   -Toe rocks 2x10   -runner calf 20 sec x 2 R/L   -6\" step up x10   Fwd  Lateral   L foot on step with knee over toe and rock back into ext 10 sec hold on end range x 10     DNP 6/9   Recumbent bike for ROM - seat 14   Rocking with pause at end range 5 min    -  2:1 eccentric heel 2x10 R/L   -  Moflex calf stretch 20 sec x 2 R/L   - Tibialis anterior stretch 20 sec x 2 R/L   - TKE harinder blue Tb x10 3 sec hold     Neuro re-ed DNP 6/9   Air ex plank 6 ft  - lateral 3 laps -hover   - bkwd 3 laps -light   - tandem 3 laps  - PRN  Air ex pad   -Step fwd and lateral 3 sec hold x 10 ea    Therapeutic exercises DNP 6/9   Supine  Heel slide x 10   Heel slides with OP using strap x10 3 sec " "  Heel prop with QS 5\" x10     Therapeutic exercises DNP 6/9   Side lying   -clam shells level 1 and 2  x10 R/L   -hip abduction x 5 -with tactile cues x 10 R     HEP   Access Code: [email or web address]  URL: [email or web address]  Date: 05/28/2025  Prepared by: Yasmine Sands    Exercises  - Supine Quadricep Sets  - 1 x daily - 5 x weekly - 2 sets - 10 reps  - Supine Heel Slide with Strap  - 1 x daily - 5 x weekly - 2 sets - 10 reps  - Standing Hip Abduction with Counter Support  - 1 x daily - 5 x weekly - 2 sets - 10 reps  - Standing Hip Flexion with Counter Support  - 1 x daily - 5 x weekly - 2 sets - 10 reps  - Standing Hip Extension with Counter Support  - 1 x daily - 5 x weekly - 2 sets - 10 reps  - Heel Toe Raises with Counter Support  - 1 x daily - 5 x weekly - 2 sets - 10 reps  - Seated Long Arc Quad  - 1 x daily - 5 x weekly - 2 sets - 10 reps  - Standing Knee Flexion with Unilateral Counter Support  - 1 x daily - 5 x weekly - 2 sets - 10 reps  - Seated Hamstring Stretch  - 1 x daily - 5 x weekly - 1 sets - 3 reps - 20-30 sec  hold  - Gastroc Stretch on Wall  - 1 x daily - 5 x weekly - 1 sets - 3 reps - 20-30 sec hold    Self flexion and ext ROM on step     Has patient been compliant with HEP? Yes     Assessment:  Patient c/o remains sharp pain at night and intermittently during the day. Myofascial restriction palpated with IASTM and tight medial hamstring tendons with DTM/Myofascial release.       Pain end of session:  \"Feels better than when I came in.\"     Plan:     Continue with current POC/no changes    Assessment of current progress against goals:  Insufficient treatment time to assess progress    Goals:    1.   The patient to be independent with HEP  2.  The patient to have an improved functional score of less than 10% disability to allow return to PLOF.  3.  Patient will demo increased AROM of affected knee to greater than or equal to 0-120 degrees to normalize gait and decrease difficulty with transfers " from low vehicle, etc  4. will improve affected knee strength to 4+ to improve functional activities such as ascending/descending steps, heavy household chores etc

## 2025-06-12 NOTE — TELEPHONE ENCOUNTER
4/29/25 TK Rev.    Patient has an appointment 6/13 for a deep cleaning at the dentist.  The dental office told him to get clearance from / or ask Dr. Pruitt if it is okay to have the procedure.    Please call patient.

## 2025-06-17 ENCOUNTER — TREATMENT (OUTPATIENT)
Dept: PHYSICAL THERAPY | Facility: CLINIC | Age: 65
End: 2025-06-17
Payer: COMMERCIAL

## 2025-06-17 DIAGNOSIS — Z96.652 HISTORY OF TOTAL LEFT KNEE REPLACEMENT (TKR): ICD-10-CM

## 2025-06-17 PROCEDURE — 97110 THERAPEUTIC EXERCISES: CPT | Mod: GP | Performed by: PHYSICAL THERAPIST

## 2025-06-17 NOTE — PROGRESS NOTES
Physical Therapy Treatment    Patient Name: Doug Encinas  MRN: [id number]  Encounter date:  6/17/2025  Time Calculation  Start Time: 0815  Stop Time: 0900  Time Calculation (min): 45 min     PT Therapeutic Procedures Time Entry  Therapeutic Exercise Time Entry: 40    Visit Number:  9 (including evaluation)  Planned total visits: 12  Visit Authorized:  30 INS 2025; NO AUTH, 3100 DED-MET, 90% COVERAGE, 30V PT, 7000 OOP-NOT MET, ANTHEM   Current Problem  1. History of total left knee replacement (TKR)  Follow Up In Physical Therapy          Follow up 6/11 with ortho     Surgery  L TKR 4/29/2025   6 weeks post op 6/16    Precautions    DM, HTN, cervical disc replacement surgery last year     Pain   1/10     Subjective  General  Feeling better, just stiff.     Objective  Myofascial restriction hamstring medial     Treatment:  Therapeutic exercises   Daquan:  - Recumbent bike for ROM - seat 14  L1 x 6 min  Tband side step x 3 laps, orange  TKE blue 10x 2  TKE with heel raise x 15\  Step up 6 in ch x 10, 8 inch 15 x 2  Tball knee flexion x 20  Hamstring iso with tball 5 sec x 15  Heel prop x 5 min   QS and SLR    Manual L   DNP        HEP   Access Code: [email or web address]  URL: [email or web address]  Date: 05/28/2025  Prepared by: Yasmine Sands    Exercises  - Supine Quadricep Sets  - 1 x daily - 5 x weekly - 2 sets - 10 reps  - Supine Heel Slide with Strap  - 1 x daily - 5 x weekly - 2 sets - 10 reps  - Standing Hip Abduction with Counter Support  - 1 x daily - 5 x weekly - 2 sets - 10 reps  - Standing Hip Flexion with Counter Support  - 1 x daily - 5 x weekly - 2 sets - 10 reps  - Standing Hip Extension with Counter Support  - 1 x daily - 5 x weekly - 2 sets - 10 reps  - Heel Toe Raises with Counter Support  - 1 x daily - 5 x weekly - 2 sets - 10 reps  - Seated Long Arc Quad  - 1 x daily - 5 x weekly - 2 sets - 10 reps  - Standing Knee Flexion with Unilateral Counter Support  - 1 x daily - 5 x weekly - 2 sets - 10 reps  - Seated  "Hamstring Stretch  - 1 x daily - 5 x weekly - 1 sets - 3 reps - 20-30 sec  hold  - Gastroc Stretch on Wall  - 1 x daily - 5 x weekly - 1 sets - 3 reps - 20-30 sec hold    Self flexion and ext ROM on step     Has patient been compliant with HEP? Yes     Assessment:  Did very well with bike, loosened up and he did veryu well with there rest of the session. Flexion doing very well, extension still lackin a few degrees.       Pain end of session:  \"Feels better than when I came in.\"     Plan:     Continue with current POC/no changes    Assessment of current progress against goals:  Insufficient treatment time to assess progress    Goals:    1.   The patient to be independent with HEP  2.  The patient to have an improved functional score of less than 10% disability to allow return to PLOF.  3.  Patient will demo increased AROM of affected knee to greater than or equal to 0-120 degrees to normalize gait and decrease difficulty with transfers from low vehicle, etc  4. will improve affected knee strength to 4+ to improve functional activities such as ascending/descending steps, heavy household chores etc           "

## 2025-06-19 ENCOUNTER — TREATMENT (OUTPATIENT)
Dept: PHYSICAL THERAPY | Facility: CLINIC | Age: 65
End: 2025-06-19
Payer: COMMERCIAL

## 2025-06-19 DIAGNOSIS — Z96.652 HISTORY OF TOTAL LEFT KNEE REPLACEMENT (TKR): ICD-10-CM

## 2025-06-19 PROCEDURE — 97140 MANUAL THERAPY 1/> REGIONS: CPT | Mod: GP | Performed by: PHYSICAL THERAPIST

## 2025-06-19 PROCEDURE — 97110 THERAPEUTIC EXERCISES: CPT | Mod: GP | Performed by: PHYSICAL THERAPIST

## 2025-06-19 NOTE — PROGRESS NOTES
Physical Therapy Treatment    Patient Name: Doug Encinas  MRN: [id number]  Encounter date:  6/19/2025  Time Calculation  Start Time: 0815  Stop Time: 0900  Time Calculation (min): 45 min     PT Therapeutic Procedures Time Entry  Therapeutic Exercise Time Entry: 30  Manual Therapy Time Entry: 10    Visit Number:  10 (including evaluation)  Planned total visits: 12  Visit Authorized:  30 INS  2025; NO AUTH, 3100 DED-MET, 90% COVERAGE, 30V PT, 7000 OOP-NOT MET, ANTHEM   Current Problem  1. History of total left knee replacement (TKR)  Follow Up In Physical Therapy          Follow up 6/11 with ortho     Surgery  L TKR 4/29/2025   6 weeks post op 6/16    Precautions    DM, HTN, cervical disc replacement surgery last year     Pain   1/10     Subjective  General  Pt reports compliance with HEP, feeling better and better all the time     Objective  Myofascial restriction hamstring medial   Flexion 122  Excellent QS  Treatment:  Therapeutic exercises   Daquan:  - Recumbent bike for ROM - seat 14  L1-6 x 8 min  Tband side step x 3 laps, orange  TKE blue 10x 2  TKE with heel raise x 15\  Step up 8 inch 20  x 2  Tball knee flexion x 20  Hamstring iso with tball 5 sec x 15  Heel prop x 5 min   QS and SLR    Manual L   Patellar mobs and STM anterior knee    HEP   Access Code: [email or web address]  URL: [email or web address]  Date: 05/28/2025  Prepared by: Yasmine Sands    Exercises  - Supine Quadricep Sets  - 1 x daily - 5 x weekly - 2 sets - 10 reps  - Supine Heel Slide with Strap  - 1 x daily - 5 x weekly - 2 sets - 10 reps  - Standing Hip Abduction with Counter Support  - 1 x daily - 5 x weekly - 2 sets - 10 reps  - Standing Hip Flexion with Counter Support  - 1 x daily - 5 x weekly - 2 sets - 10 reps  - Standing Hip Extension with Counter Support  - 1 x daily - 5 x weekly - 2 sets - 10 reps  - Heel Toe Raises with Counter Support  - 1 x daily - 5 x weekly - 2 sets - 10 reps  - Seated Long Arc Quad  - 1 x daily - 5 x weekly - 2 sets - 10  "reps  - Standing Knee Flexion with Unilateral Counter Support  - 1 x daily - 5 x weekly - 2 sets - 10 reps  - Seated Hamstring Stretch  - 1 x daily - 5 x weekly - 1 sets - 3 reps - 20-30 sec  hold  - Gastroc Stretch on Wall  - 1 x daily - 5 x weekly - 1 sets - 3 reps - 20-30 sec hold    Self flexion and ext ROM on step     Has patient been compliant with HEP? Yes     Assessment:  Did very well with bike, loosened up and he did veryu well with there rest of the session. Flexion doing very well, extension still lackin a few degrees.       Pain end of session:  \"Feels better than when I came in.\"     Plan:     Continue with current POC/no changes    Assessment of current progress against goals:  Insufficient treatment time to assess progress    Goals:    1.   The patient to be independent with HEP  2.  The patient to have an improved functional score of less than 10% disability to allow return to PLOF.  3.  Patient will demo increased AROM of affected knee to greater than or equal to 0-120 degrees to normalize gait and decrease difficulty with transfers from low vehicle, etc  4. will improve affected knee strength to 4+ to improve functional activities such as ascending/descending steps, heavy household chores etc           "

## 2025-06-23 ENCOUNTER — APPOINTMENT (OUTPATIENT)
Dept: PHYSICAL THERAPY | Facility: CLINIC | Age: 65
End: 2025-06-23
Payer: COMMERCIAL

## 2025-06-26 ENCOUNTER — TREATMENT (OUTPATIENT)
Dept: PHYSICAL THERAPY | Facility: CLINIC | Age: 65
End: 2025-06-26
Payer: COMMERCIAL

## 2025-06-26 DIAGNOSIS — Z96.652 HISTORY OF TOTAL LEFT KNEE REPLACEMENT (TKR): ICD-10-CM

## 2025-06-26 PROCEDURE — 97110 THERAPEUTIC EXERCISES: CPT | Mod: GP | Performed by: PHYSICAL THERAPIST

## 2025-06-26 NOTE — PROGRESS NOTES
Physical Therapy Treatment    Patient Name: Doug Encinas  MRN: [id number]  Encounter date:  6/26/2025  Time Calculation  Start Time: 0808  Stop Time: 0900  Time Calculation (min): 52 min     PT Therapeutic Procedures Time Entry  Therapeutic Exercise Time Entry: 48    Visit Number:  11 (including evaluation)  Planned total visits: 12  Visit Authorized:  30 INS 2025; NO AUTH, 3100 DED-MET, 90% COVERAGE, 30V PT, 7000 OOP-NOT MET, ANTHEM   Current Problem  1. History of total left knee replacement (TKR)  Follow Up In Physical Therapy          Follow up 6/11 with ortho     Surgery  L TKR 4/29/2025   7 weeks post op 6/23    Precautions    DM, HTN, cervical disc replacement surgery last year     Pain   1/10     Subjective  General  Pt reports knee started to bother him  yesterday, was not really doining anything, watch grandson all day- pain is 4/10 , above the knee ( quad tendon)     Objective  Myofascial restriction hamstring medial   Flexion 122  Excellent QS  Treatment:  Therapeutic exercises   Daquan:  - Recumbent bike for ROM - seat 14  L1-6 x 8 min  Resisted walking 5# x5 , 4 way  Leg press , SL 20-40#  1 leg balance  TKE blue 10x 2  TKE with heel raise x 15\  Step up 8 inch 20  x 2  Tball knee flexion x 20  Hamstring iso with tball 5 sec x 15  Heel prop x 5 min   QS and SLR    Manual L   Patellar mobs and STM anterior knee    HEP   Access Code: [email or web address]  URL: [email or web address]  Date: 05/28/2025  Prepared by: Yasmine Sands    Exercises  - Supine Quadricep Sets  - 1 x daily - 5 x weekly - 2 sets - 10 reps  - Supine Heel Slide with Strap  - 1 x daily - 5 x weekly - 2 sets - 10 reps  - Standing Hip Abduction with Counter Support  - 1 x daily - 5 x weekly - 2 sets - 10 reps  - Standing Hip Flexion with Counter Support  - 1 x daily - 5 x weekly - 2 sets - 10 reps  - Standing Hip Extension with Counter Support  - 1 x daily - 5 x weekly - 2 sets - 10 reps  - Heel Toe Raises with Counter Support  - 1 x daily - 5 x  "weekly - 2 sets - 10 reps  - Seated Long Arc Quad  - 1 x daily - 5 x weekly - 2 sets - 10 reps  - Standing Knee Flexion with Unilateral Counter Support  - 1 x daily - 5 x weekly - 2 sets - 10 reps  - Seated Hamstring Stretch  - 1 x daily - 5 x weekly - 1 sets - 3 reps - 20-30 sec  hold  - Gastroc Stretch on Wall  - 1 x daily - 5 x weekly - 1 sets - 3 reps - 20-30 sec hold    Self flexion and ext ROM on step     Has patient been compliant with HEP? Yes     Assessment:  Did very well with bike, loosened up and he did veryu well with there rest of the session. Flexion doing very well, extension still lackin a few degrees.       Pain end of session:  \"Feels better than when I came in.\"     Plan:     Continue with current POC/no changes    Assessment of current progress against goals:  Insufficient treatment time to assess progress    Goals:    1.   The patient to be independent with HEP  2.  The patient to have an improved functional score of less than 10% disability to allow return to PLOF.  3.  Patient will demo increased AROM of affected knee to greater than or equal to 0-120 degrees to normalize gait and decrease difficulty with transfers from low vehicle, etc  4. will improve affected knee strength to 4+ to improve functional activities such as ascending/descending steps, heavy household chores etc           "

## 2025-07-01 ENCOUNTER — TREATMENT (OUTPATIENT)
Dept: PHYSICAL THERAPY | Facility: CLINIC | Age: 65
End: 2025-07-01
Payer: COMMERCIAL

## 2025-07-01 DIAGNOSIS — Z96.652 HISTORY OF TOTAL LEFT KNEE REPLACEMENT (TKR): ICD-10-CM

## 2025-07-01 PROCEDURE — 97110 THERAPEUTIC EXERCISES: CPT | Mod: GP | Performed by: PHYSICAL THERAPIST

## 2025-07-01 PROCEDURE — 97140 MANUAL THERAPY 1/> REGIONS: CPT | Mod: GP | Performed by: PHYSICAL THERAPIST

## 2025-07-01 NOTE — PROGRESS NOTES
Physical Therapy Treatment    Patient Name: Doug Encinas  MRN: [id number]  Encounter date:  7/1/2025  Time Calculation  Start Time: 0815  Stop Time: 0900  Time Calculation (min): 45 min     PT Therapeutic Procedures Time Entry  Therapeutic Exercise Time Entry: 35  Manual Therapy Time Entry: 10    Visit Number:  12 (including evaluation)  Planned total visits: 12  Visit Authorized:  30 INS 2025; NO AUTH, 3100 DED-MET, 90% COVERAGE, 30V PT, 7000 OOP-NOT MET, ANTHEM   Current Problem  1. History of total left knee replacement (TKR)  Follow Up In Physical Therapy          Follow up 6/11 with ortho     Surgery  L TKR 4/29/2025   7 weeks post op 6/23    Precautions    DM, HTN, cervical disc replacement surgery last year     Pain   1/10     Subjective  General  Pt reports that he the knee is swollen.  Other wise, pretty happy with progress     Objective  Myofascial restriction hamstring medial   Flexion 122  Excellent QS  Treatment:  Therapeutic exercises   Daquan:  - Recumbent bike for ROM - seat 14  L1-6 x 8 min  Resisted walking 5# x5 , 4 way  Leg press , SL 20-40#  1 leg balance  TKE blue 10x 2  TKE with heel raise x 15\  Step up 8 inch 20  x 2  Tball knee flexion x 20  Hamstring iso with tball 5 sec x 15  Heel prop x 5 min   QS and SLR    Manual L   Patellar mobs and STM anterior knee    HEP   Access Code: [email or web address]  URL: [email or web address]  Date: 05/28/2025  Prepared by: Yasmine Sands    Exercises  - Supine Quadricep Sets  - 1 x daily - 5 x weekly - 2 sets - 10 reps  - Supine Heel Slide with Strap  - 1 x daily - 5 x weekly - 2 sets - 10 reps  - Standing Hip Abduction with Counter Support  - 1 x daily - 5 x weekly - 2 sets - 10 reps  - Standing Hip Flexion with Counter Support  - 1 x daily - 5 x weekly - 2 sets - 10 reps  - Standing Hip Extension with Counter Support  - 1 x daily - 5 x weekly - 2 sets - 10 reps  - Heel Toe Raises with Counter Support  - 1 x daily - 5 x weekly - 2 sets - 10 reps  - Seated Long  "Arc Quad  - 1 x daily - 5 x weekly - 2 sets - 10 reps  - Standing Knee Flexion with Unilateral Counter Support  - 1 x daily - 5 x weekly - 2 sets - 10 reps  - Seated Hamstring Stretch  - 1 x daily - 5 x weekly - 1 sets - 3 reps - 20-30 sec  hold  - Gastroc Stretch on Wall  - 1 x daily - 5 x weekly - 1 sets - 3 reps - 20-30 sec hold    Self flexion and ext ROM on step     Has patient been compliant with HEP? Yes     Assessment:  Did very well with bike, loosened up and he did veryu well with there rest of the session. Flexion doing very well, extension still lackin a few degrees.       Pain end of session:  \"Feels better than when I came in.\"     Plan:     Continue with current POC/no changes    Assessment of current progress against goals:  Insufficient treatment time to assess progress    Goals:    1.   The patient to be independent with HEP  2.  The patient to have an improved functional score of less than 10% disability to allow return to PLOF.  3.  Patient will demo increased AROM of affected knee to greater than or equal to 0-120 degrees to normalize gait and decrease difficulty with transfers from low vehicle, etc  4. will improve affected knee strength to 4+ to improve functional activities such as ascending/descending steps, heavy household chores etc           "

## 2025-07-03 ENCOUNTER — HOSPITAL ENCOUNTER (OUTPATIENT)
Dept: RADIOLOGY | Facility: CLINIC | Age: 65
Discharge: HOME | End: 2025-07-03
Payer: COMMERCIAL

## 2025-07-03 ENCOUNTER — APPOINTMENT (OUTPATIENT)
Dept: ORTHOPEDIC SURGERY | Facility: CLINIC | Age: 65
End: 2025-07-03
Payer: COMMERCIAL

## 2025-07-03 ENCOUNTER — TREATMENT (OUTPATIENT)
Dept: PHYSICAL THERAPY | Facility: CLINIC | Age: 65
End: 2025-07-03
Payer: COMMERCIAL

## 2025-07-03 DIAGNOSIS — Z96.652 HISTORY OF TOTAL LEFT KNEE REPLACEMENT (TKR): ICD-10-CM

## 2025-07-03 DIAGNOSIS — Z96.652 S/P TOTAL KNEE ARTHROPLASTY, LEFT: ICD-10-CM

## 2025-07-03 PROCEDURE — 73562 X-RAY EXAM OF KNEE 3: CPT | Mod: LEFT SIDE | Performed by: RADIOLOGY

## 2025-07-03 PROCEDURE — 4010F ACE/ARB THERAPY RXD/TAKEN: CPT

## 2025-07-03 PROCEDURE — 97014 ELECTRIC STIMULATION THERAPY: CPT | Mod: GP | Performed by: PHYSICAL THERAPIST

## 2025-07-03 PROCEDURE — 97110 THERAPEUTIC EXERCISES: CPT | Mod: GP | Performed by: PHYSICAL THERAPIST

## 2025-07-03 PROCEDURE — 73562 X-RAY EXAM OF KNEE 3: CPT | Mod: RT

## 2025-07-03 PROCEDURE — 73564 X-RAY EXAM KNEE 4 OR MORE: CPT | Mod: LT

## 2025-07-03 PROCEDURE — 99024 POSTOP FOLLOW-UP VISIT: CPT

## 2025-07-03 PROCEDURE — 20561 NDL INSJ W/O NJX 3+ MUSC: CPT | Mod: GP | Performed by: PHYSICAL THERAPIST

## 2025-07-03 PROCEDURE — 73564 X-RAY EXAM KNEE 4 OR MORE: CPT | Mod: LEFT SIDE | Performed by: RADIOLOGY

## 2025-07-03 ASSESSMENT — PAIN - FUNCTIONAL ASSESSMENT: PAIN_FUNCTIONAL_ASSESSMENT: NO/DENIES PAIN

## 2025-07-03 NOTE — PROGRESS NOTES
Chief Complaint   Patient presents with    Left Knee - Follow-up     04/26/25 LT TKA REV     Doing great, hard time sleeping, doing great with PT, 120 flex,pain at night or if walking too fast      This is a 65 y.o. male who is 6 weeks out from *** total knee.  No drainage from his incision no fevers or chills.  Progressing well with physical therapy and appropriately improving in function.  No other new issues or symptoms.    ***Knee examination: Surgical incision well healed no erythema no drainage.  Stable to varus valgus stress range of motion ***.  Neurovascular tact distally    X-rays of the knee were reviewed independently interpreted by me today, the show stable total knee arthroplasty no fracture dislocation or loosening    Impression plan: 65 y.o. male 6 weeks out from *** total knee.  We discussed continuing physical therapy and home exercise program. Pain medications to be used as needed. Assistive devices as needed per PT. We discussed DVT prophylaxis until 6 weeks. No dentist until 3 months and thereafter dental prophylaxis for life. Activity as tolerated weightbearing as tolerated. I have personally reviewed the OARRS report for the patient. This report is scanned into the electronic medical record. I have considered the risks of abuse, dependence, addiction and diversion. Follow up 3 months with xrays.

## 2025-07-03 NOTE — PROGRESS NOTES
Physical Therapy Treatment    Patient Name: Doug Encinas  MRN: [id number]  Encounter date:  7/3/2025  Time Calculation  Start Time: 0816  Stop Time: 0915  Time Calculation (min): 59 min  PT Modalities Time Entry  E-Stim (Unattended) Time Entry: 10  PT Therapeutic Procedures Time Entry  Therapeutic Exercise Time Entry: 40  Needle Insertion w/o Injection 3+ Muscles: 10    Visit Number:  13 (including evaluation)  Planned total visits: 12  Visit Authorized:  30 INS  2025; NO AUTH, 3100 DED-MET, 90% COVERAGE, 30V PT, 7000 OOP-NOT MET, ANTHEM   Current Problem  1. History of total left knee replacement (TKR)  Follow Up In Physical Therapy            Follow up 6/11 with ortho     Surgery  L TKR 4/29/2025   8 weeks post op 6/23    Precautions    DM, HTN, cervical disc replacement surgery last year     Pain   1/10     Subjective  General  Pt reports that he has aching at night, after he is settled for a while, it is behind the knee and goes down into his calf. He sees the doctor later this morning. Overall, he is feeling pretty good. He denies any LBP     Objective  Myofascial restriction hamstring medial   Flexion 122  Excellent QS  + sciatic neural tension and active trigger points in L piriformis, biceps femoris, soleus, peroneal and gastroc    Treatment:  Therapeutic exercises   Daquan:  - Recumbent bike for ROM - seat 14  L1-6 x 8 min  Resisted walking 10# x5 , 4 way  Leg press -160 , SL 40-60#  Sciatic nerve 10 x 3  Handout issued or sciatic nerve glides  DNP  1 leg balance  TKE blue 10x 2  TKE with heel raise x 15\  Step up 8 inch 20  x 2  Tball knee flexion x 20  Hamstring iso with tball 5 sec x 15  Heel prop x 5 min   QS and SLR    Manual L   Patellar mobs and STM anterior knee  Electrical Nerve Stimulation via dry needling following informed consent; 3Hz, mA to tolerance, applied to L glut max, and piriformis, lateral hamstring, gastroc and soleus, all in sideling, for 10 minutes.   HEP   Access Code:  [email or web address]  URL: [email or web address]  Date: 05/28/2025  Prepared by: Yasmine Sands    Exercises  - Supine Quadricep Sets  - 1 x daily - 5 x weekly - 2 sets - 10 reps  - Supine Heel Slide with Strap  - 1 x daily - 5 x weekly - 2 sets - 10 reps  - Standing Hip Abduction with Counter Support  - 1 x daily - 5 x weekly - 2 sets - 10 reps  - Standing Hip Flexion with Counter Support  - 1 x daily - 5 x weekly - 2 sets - 10 reps  - Standing Hip Extension with Counter Support  - 1 x daily - 5 x weekly - 2 sets - 10 reps  - Heel Toe Raises with Counter Support  - 1 x daily - 5 x weekly - 2 sets - 10 reps  - Seated Long Arc Quad  - 1 x daily - 5 x weekly - 2 sets - 10 reps  - Standing Knee Flexion with Unilateral Counter Support  - 1 x daily - 5 x weekly - 2 sets - 10 reps  - Seated Hamstring Stretch  - 1 x daily - 5 x weekly - 1 sets - 3 reps - 20-30 sec  hold  - Gastroc Stretch on Wall  - 1 x daily - 5 x weekly - 1 sets - 3 reps - 20-30 sec hold    Self flexion and ext ROM on step     Has patient been compliant with HEP? Yes     Assessment:  Sciatic nerve irratation at slplit, behind the lateral knee seems to be responsible for his difficulty sleeping. DN and sciatic nerve glide helpful to relieve these symptoms.       Pain end of session:  \"Feels better than when I came in.\"     Plan:     Continue with current POC/no changes    Assessment of current progress against goals:  Insufficient treatment time to assess progress    Goals:    1.   The patient to be independent with HEP  2.  The patient to have an improved functional score of less than 10% disability to allow return to PLOF.  3.  Patient will demo increased AROM of affected knee to greater than or equal to 0-120 degrees to normalize gait and decrease difficulty with transfers from low vehicle, etc  4. will improve affected knee strength to 4+ to improve functional activities such as ascending/descending steps, heavy household chores etc           "

## 2025-07-03 NOTE — LETTER
July 3, 2025     Patient: Doug Encinas   YOB: 1960   Date of Visit: 7/3/2025       To Whom It May Concern:    It is my medical opinion that Doug Encinas is able to return to work with no restrictions on July 29th 2025. He has been doing well recovering from left total knee revision and will be ready to go by then..    If you have any questions or concerns, please don't hesitate to call.         Sincerely,        Nick Jo PA-C    CC: No Recipients

## 2025-07-08 ENCOUNTER — APPOINTMENT (OUTPATIENT)
Dept: PHYSICAL THERAPY | Facility: CLINIC | Age: 65
End: 2025-07-08
Payer: COMMERCIAL

## 2025-07-08 DIAGNOSIS — Z96.652 HISTORY OF TOTAL LEFT KNEE REPLACEMENT (TKR): ICD-10-CM

## 2025-07-08 PROCEDURE — 97110 THERAPEUTIC EXERCISES: CPT | Mod: GP | Performed by: PHYSICAL THERAPIST

## 2025-07-08 NOTE — PROGRESS NOTES
Physical Therapy Treatment    Patient Name: Doug Encinas  MRN: [id number]  Encounter date:  7/8/2025  Time Calculation  Start Time: 0428  Stop Time: 0508  Time Calculation (min): 40 min     PT Therapeutic Procedures Time Entry  Therapeutic Exercise Time Entry: 38    Visit Number:  14 (including evaluation)  Planned total visits: 12  Visit Authorized:  30 INS 2025; NO AUTH, 3100 DED-MET, 90% COVERAGE, 30V PT, 7000 OOP-NOT MET, ANTHEM   Current Problem  1. History of total left knee replacement (TKR)  Follow Up In Physical Therapy            Follow up 6/11 with ortho     Surgery  L TKR 4/29/2025   10 weeks post op 6/23    Precautions    DM, HTN, cervical disc replacement surgery last year     Pain   1/10     Subjective  General  Pt reports that he has aching at night, after he is settled for a while, it is behind the knee and goes down into his calf. He sees the doctor later this morning. Overall, he is feeling pretty good. He denies any LBP     Objective  Myofascial restriction hamstring medial   Flexion 122  Excellent QS  + sciatic neural tension and active trigger points in L piriformis, biceps femoris, soleus, peroneal and gastroc    Treatment:  Therapeutic exercises   Daquan:  - Recumbent bike for ROM - seat 14  L1-6 x 8 min  Resisted walking 10# x5 , 4 way  Leg press -160 , SL 40-60#  Sciatic nerve 10 x 3  Handout issued or sciatic nerve glides  DNP  1 leg balance  TKE blue 10x 2  TKE with heel raise x 15\  Step up 8 inch 20  x 2  Tball knee flexion x 20  Hamstring iso with tball 5 sec x 15  Heel prop x 5 min   QS and SLR    Manual L   Patellar mobs and STM anterior knee  Electrical Nerve Stimulation via dry needling following informed consent; 3Hz, mA to tolerance, applied to L glut max, and piriformis, lateral hamstring, gastroc and soleus, all in sideling, for 10 minutes.   HEP   Access Code: [email or web address]  URL: [email or web address]  Date: 05/28/2025  Prepared by: Yasmine Palomo  - Supine  "Quadricep Sets  - 1 x daily - 5 x weekly - 2 sets - 10 reps  - Supine Heel Slide with Strap  - 1 x daily - 5 x weekly - 2 sets - 10 reps  - Standing Hip Abduction with Counter Support  - 1 x daily - 5 x weekly - 2 sets - 10 reps  - Standing Hip Flexion with Counter Support  - 1 x daily - 5 x weekly - 2 sets - 10 reps  - Standing Hip Extension with Counter Support  - 1 x daily - 5 x weekly - 2 sets - 10 reps  - Heel Toe Raises with Counter Support  - 1 x daily - 5 x weekly - 2 sets - 10 reps  - Seated Long Arc Quad  - 1 x daily - 5 x weekly - 2 sets - 10 reps  - Standing Knee Flexion with Unilateral Counter Support  - 1 x daily - 5 x weekly - 2 sets - 10 reps  - Seated Hamstring Stretch  - 1 x daily - 5 x weekly - 1 sets - 3 reps - 20-30 sec  hold  - Gastroc Stretch on Wall  - 1 x daily - 5 x weekly - 1 sets - 3 reps - 20-30 sec hold    Self flexion and ext ROM on step     Has patient been compliant with HEP? Yes     Assessment:  Sciatic nerve irratation at slplit, behind the lateral knee seems to be responsible for his difficulty sleeping. DN and sciatic nerve glide helpful to relieve these symptoms.       Pain end of session:  \"Feels better than when I came in.\"     Plan:     Continue with current POC/no changes    Assessment of current progress against goals:  Insufficient treatment time to assess progress    Goals:    1.   The patient to be independent with HEP  2.  The patient to have an improved functional score of less than 10% disability to allow return to PLOF.  3.  Patient will demo increased AROM of affected knee to greater than or equal to 0-120 degrees to normalize gait and decrease difficulty with transfers from low vehicle, etc  4. will improve affected knee strength to 4+ to improve functional activities such as ascending/descending steps, heavy household chores etc           "

## 2025-07-10 ENCOUNTER — TREATMENT (OUTPATIENT)
Dept: PHYSICAL THERAPY | Facility: CLINIC | Age: 65
End: 2025-07-10
Payer: COMMERCIAL

## 2025-07-10 DIAGNOSIS — Z96.652 HISTORY OF TOTAL LEFT KNEE REPLACEMENT (TKR): ICD-10-CM

## 2025-07-10 PROCEDURE — 97110 THERAPEUTIC EXERCISES: CPT | Mod: GP | Performed by: PHYSICAL THERAPIST

## 2025-07-10 NOTE — PROGRESS NOTES
Physical Therapy Treatment    Patient Name: Doug Encinas  MRN: [id number]  Encounter date:  7/10/2025  Time Calculation  Start Time: 0815  Stop Time: 0845  Time Calculation (min): 30 min     PT Therapeutic Procedures Time Entry  Therapeutic Exercise Time Entry: 30    Visit Number:  15 (including evaluation)  Planned total visits: 12  Visit Authorized:  30 INS  2025; NO AUTH, 3100 DED-MET, 90% COVERAGE, 30V PT, 7000 OOP-NOT MET, ANTHEM   Current Problem  1. 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 He feels ready to start on his won.     Objective  Myofascial restriction hamstring medial   Flexion 122  Excellent QS  + sciatic neural tension and active trigger points in L piriformis, biceps femoris, soleus, peroneal and gastroc    Treatment:  Therapeutic exercises   Daquan:  - Recumbent bike for ROM - seat 14  L1-6 x 8 min  Resisted walking 10# x5 , 4 way  Leg press -160 , SL 40-60#  Sciatic nerve 10 x 3  Handout issued or sciatic nerve glides  DNP  1 leg balance  TKE blue 10x 2  TKE with heel raise x 15\  Step up 8 inch 20  x 2  Tball knee flexion x 20  Hamstring iso with tball 5 sec x 15  Heel prop x 5 min   QS and SLR    Manual L   Patellar mobs and STM anterior knee  Electrical Nerve Stimulation via dry needling following informed consent; 3Hz, mA to tolerance, applied to L glut max, and piriformis, lateral hamstring, gastroc and soleus, all in sideling, for 10 minutes.   HEP   Access Code: [email or web address]  URL: [email or web address]  Date: 05/28/2025  Prepared by: Yasmine Sands    Exercises  - Supine Quadricep Sets  - 1 x daily - 5 x weekly - 2 sets - 10 reps  - Supine Heel Slide with Strap  - 1  "x daily - 5 x weekly - 2 sets - 10 reps  - Standing Hip Abduction with Counter Support  - 1 x daily - 5 x weekly - 2 sets - 10 reps  - Standing Hip Flexion with Counter Support  - 1 x daily - 5 x weekly - 2 sets - 10 reps  - Standing Hip Extension with Counter Support  - 1 x daily - 5 x weekly - 2 sets - 10 reps  - Heel Toe Raises with Counter Support  - 1 x daily - 5 x weekly - 2 sets - 10 reps  - Seated Long Arc Quad  - 1 x daily - 5 x weekly - 2 sets - 10 reps  - Standing Knee Flexion with Unilateral Counter Support  - 1 x daily - 5 x weekly - 2 sets - 10 reps  - Seated Hamstring Stretch  - 1 x daily - 5 x weekly - 1 sets - 3 reps - 20-30 sec  hold  - Gastroc Stretch on Wall  - 1 x daily - 5 x weekly - 1 sets - 3 reps - 20-30 sec hold    Self flexion and ext ROM on step     Has patient been compliant with HEP? Yes     Assessment:  Sciatic nerve irratation at slplit, behind the lateral knee seems to be responsible for his difficulty sleeping. DN and sciatic nerve glide helpful to relieve these symptoms.       Pain end of session:  \"Feels better than when I came in.\"     Plan:     Continue with current POC/no changes    Assessment of current progress against goals:  Insufficient treatment time to assess progress    Goals:    1.   The patient to be independent with HEP  2.  The patient to have an improved functional score of less than 10% disability to allow return to PLOF.  3.  Patient will demo increased AROM of affected knee to greater than or equal to 0-120 degrees to normalize gait and decrease difficulty with transfers from low vehicle, etc  4. will improve affected knee strength to 4+ to improve functional activities such as ascending/descending steps, heavy household chores etc           "

## 2025-07-25 DIAGNOSIS — E11.9 TYPE 2 DIABETES MELLITUS WITHOUT COMPLICATION, UNSPECIFIED WHETHER LONG TERM INSULIN USE: ICD-10-CM

## 2025-08-23 DIAGNOSIS — Z79.4 TYPE 2 DIABETES MELLITUS WITHOUT COMPLICATION, WITH LONG-TERM CURRENT USE OF INSULIN: ICD-10-CM

## 2025-08-23 DIAGNOSIS — E11.9 TYPE 2 DIABETES MELLITUS WITHOUT COMPLICATION, WITH LONG-TERM CURRENT USE OF INSULIN: ICD-10-CM

## 2025-08-25 ENCOUNTER — APPOINTMENT (OUTPATIENT)
Dept: PRIMARY CARE | Facility: CLINIC | Age: 65
End: 2025-08-25
Payer: COMMERCIAL

## 2025-08-25 RX ORDER — METFORMIN HYDROCHLORIDE 750 MG/1
750 TABLET, EXTENDED RELEASE ORAL 2 TIMES DAILY
Qty: 180 TABLET | Refills: 1 | Status: SHIPPED | OUTPATIENT
Start: 2025-08-25

## 2025-09-02 ENCOUNTER — TELEPHONE (OUTPATIENT)
Dept: PRIMARY CARE | Facility: CLINIC | Age: 65
End: 2025-09-02

## 2025-09-02 ENCOUNTER — APPOINTMENT (OUTPATIENT)
Dept: PRIMARY CARE | Facility: CLINIC | Age: 65
End: 2025-09-02
Payer: COMMERCIAL

## 2025-09-02 DIAGNOSIS — E11.9 TYPE 2 DIABETES MELLITUS WITHOUT COMPLICATION, UNSPECIFIED WHETHER LONG TERM INSULIN USE: ICD-10-CM

## 2025-09-02 ASSESSMENT — PAIN SCALES - GENERAL: PAINLEVEL_OUTOF10: 0-NO PAIN

## 2025-09-04 ENCOUNTER — HOSPITAL ENCOUNTER (OUTPATIENT)
Dept: RADIOLOGY | Facility: CLINIC | Age: 65
Discharge: HOME | End: 2025-09-04
Payer: COMMERCIAL

## 2025-09-04 ENCOUNTER — APPOINTMENT (OUTPATIENT)
Dept: ORTHOPEDIC SURGERY | Facility: CLINIC | Age: 65
End: 2025-09-04
Payer: COMMERCIAL

## 2025-09-04 DIAGNOSIS — Z96.652 S/P TOTAL KNEE ARTHROPLASTY, LEFT: ICD-10-CM

## 2025-09-04 PROCEDURE — 73564 X-RAY EXAM KNEE 4 OR MORE: CPT | Mod: LEFT SIDE | Performed by: RADIOLOGY

## 2025-09-04 PROCEDURE — 73562 X-RAY EXAM OF KNEE 3: CPT | Mod: RT

## 2025-09-04 PROCEDURE — 73564 X-RAY EXAM KNEE 4 OR MORE: CPT | Mod: LT

## 2025-09-04 PROCEDURE — 73562 X-RAY EXAM OF KNEE 3: CPT | Mod: LEFT SIDE | Performed by: RADIOLOGY

## 2025-09-04 ASSESSMENT — PAIN SCALES - GENERAL: PAINLEVEL_OUTOF10: 1

## 2025-09-04 ASSESSMENT — PAIN - FUNCTIONAL ASSESSMENT: PAIN_FUNCTIONAL_ASSESSMENT: 0-10

## 2025-12-02 ENCOUNTER — APPOINTMENT (OUTPATIENT)
Dept: PRIMARY CARE | Facility: CLINIC | Age: 65
End: 2025-12-02
Payer: COMMERCIAL

## 2025-12-26 ENCOUNTER — APPOINTMENT (OUTPATIENT)
Dept: PRIMARY CARE | Facility: CLINIC | Age: 65
End: 2025-12-26
Payer: COMMERCIAL

## 2026-01-02 ENCOUNTER — APPOINTMENT (OUTPATIENT)
Dept: PRIMARY CARE | Facility: CLINIC | Age: 66
End: 2026-01-02
Payer: COMMERCIAL

## (undated) DEVICE — SUTURE, VICRYL, 2-0, 27 IN, BR/SH 27, VIOLET

## (undated) DEVICE — PAD, KNEE PATIENT, DEMAYO, DISP, STERILE

## (undated) DEVICE — HEMOSTATIC, MATRIX, SURGIFLO, 8ML

## (undated) DEVICE — BLADE, RECIPROCATOR 12.5 X 76 X 0.9MM

## (undated) DEVICE — COUNTER, NEEDLE, FOAM BLOCK, POP-N-COUNT, W.MAGNET, W/BLADEGUARD 20/40 COUNT, RED

## (undated) DEVICE — IRRIGATION SYSTEM, WOUND, PULSAVAC PLUS

## (undated) DEVICE — APPLICATOR, CHLORAPREP, W/ORANGE TINT, 26ML

## (undated) DEVICE — DRESSING, GAUZE, DRAIN SPONGE, 6 PLY, EXCILON, 4 X 4 IN, STERILE

## (undated) DEVICE — SOLUTION, INJECTION, 0.9% SODIUM CHL, USP LIFECARE 1000 MI

## (undated) DEVICE — DRAPE, SHEET, FAN FOLDED, HALF, 44 X 58 IN, DISPOSABLE, LF, STERILE

## (undated) DEVICE — SUTURE, STRATAFIX, SPIRAL PDS PLUS, 1-0, 9IN, 24CM, CT-1, VIOLET

## (undated) DEVICE — CATHETER, IV, JELCO, 14G X 1.25IN, W/O SAFETY

## (undated) DEVICE — TIP, SUCTION, YANKAUER, FLEXIBLE

## (undated) DEVICE — DRAPE, SHEET, 17 X 23 IN

## (undated) DEVICE — HOOD, SURGICAL, FLYTE, T7 PLUS, PEEL AWAY SHIELD

## (undated) DEVICE — ELECTRODE, ELECTROSURGICAL, COAGULATOR, W/SUCTION, HANDSWITCH, 10 FR, 6 IN

## (undated) DEVICE — BAG, DECANTER

## (undated) DEVICE — GLOVE, SURGICAL, PROTEXIS PI , 8.0, PF, LF

## (undated) DEVICE — STAPLER, SKIN, PLUS, WIDE, 35

## (undated) DEVICE — TIP, SUCTION, FRAZIER, 12 FR

## (undated) DEVICE — GLOVE, SURGICAL, PROTEXIS PI , 7.5, PF, LF

## (undated) DEVICE — DIFFUSER, MAESTRO, CORE

## (undated) DEVICE — SUTURE, CTD, VICRYL, 2-0, UND, BR, CT-2

## (undated) DEVICE — KIT, MINOR, DOUBLE BASIN

## (undated) DEVICE — SUTURE, VICRYL, 4-0, 27 IN, PS-2, UNDYED

## (undated) DEVICE — SYRINGE, 30 CC, LUER LOCK

## (undated) DEVICE — DRAPE PACK, TOTAL KNEE, CUSTOM, GEAUGA

## (undated) DEVICE — BUR, ROUND, AGGRESSIVE, FLUTED, 4.0MM

## (undated) DEVICE — SUTURE, PDSII, 1, TP-1, VIL, MONO, 48LP

## (undated) DEVICE — IMMOBILIZER, KNEE, POST OP, SUPER LITE, W/STRAIGHT STAYS, MEDIUM, 20 IN

## (undated) DEVICE — DRAIN, JP CHANNEL, 10 FR, FULL CHANNEL, W/ TROCAR

## (undated) DEVICE — DRAPE PACK, BASIC VI, AURORA, 50 X 90IN

## (undated) DEVICE — CUFF, TOURNIQUET, 30 X 4, SNGL PORT/SNGL BLADDER, DISP, LF

## (undated) DEVICE — IRRIGATION SYSTEM, WOUND, SURGIPHOR, 450ML, STERILE

## (undated) DEVICE — Device

## (undated) DEVICE — BUR, 3.0, ROUND, CUTTING, ELITE, F/SABER DRILL

## (undated) DEVICE — TOWEL PACK 10-PK

## (undated) DEVICE — SUTURE, PDS II, 2-0, 27 IN, CT2, VIOLET

## (undated) DEVICE — SUTURE, ETHIBOND XTRA, 5 CCS, GRN/BR, LF

## (undated) DEVICE — CLOSURE SYSTEM, DERMABOND, PRINEO, 22CM, STERILE

## (undated) DEVICE — HALTER, HEAD, DELUXE DISCARD

## (undated) DEVICE — STRIP, SKIN CLOSURE, STERI STRIP, REINFORCED, 0.5 X 4 IN

## (undated) DEVICE — IMMOBILIZER, KNEE, POST OP, SUPER LITE, W/STRAIGHT STAYS, LARGE, 20 IN

## (undated) DEVICE — CATHETER TRAY, FOLEY, ALOETOUCH, 16FR, 10ML, W/ DRAINBAG

## (undated) DEVICE — DRAPE, SHEET, U, W/ADHESIVE STRIP, IMPERVIOUS, 60 X 70 IN, DISPOSABLE, LF, STERILE

## (undated) DEVICE — ADHESIVE, SKIN, MASTISOL, 2/3 CC VIAL

## (undated) DEVICE — TOWEL PACK 2/PK

## (undated) DEVICE — SUTURE, ETHILON, 2-0, FSLX 30, BLACK

## (undated) DEVICE — BLADE, OSCILLATOR 19.5 X 86 X 1.27MM

## (undated) DEVICE — TUBING, SUCTION, 6MM X 10, CLEAN N-COND

## (undated) DEVICE — SOLUTION, IRRIGATION, USP, 0.9% SODIUM CHL, 3000ML, TITAN XL

## (undated) DEVICE — WOUND SYSTEM, DEBRIDEMENT & CLEANING, O.R DUOPAK

## (undated) DEVICE — SLEEVE, VASO PRESS, CALF GARMENT, MEDIUM, GREEN

## (undated) DEVICE — SUTURE, STRATAFIX, 4-0, MONOCRYL PLUS, 27IN PS-2 70CM, UNDYED

## (undated) DEVICE — BANDAGE, ELASTIC,  6 IN X 11 YDS, STERILE, LF

## (undated) DEVICE — DRESSING, MEPILEX BORDER, POST-OP AG, 4 X 10 IN

## (undated) DEVICE — SUTURE, VICRYL, 1, 24 IN, CTD, UNDYED

## (undated) DEVICE — SOLUTION, IRRIGATION, X RX SODIUM CHL 0.9%, 1000ML BTL